# Patient Record
Sex: FEMALE | Race: WHITE | NOT HISPANIC OR LATINO | URBAN - METROPOLITAN AREA
[De-identification: names, ages, dates, MRNs, and addresses within clinical notes are randomized per-mention and may not be internally consistent; named-entity substitution may affect disease eponyms.]

---

## 2019-07-14 ENCOUNTER — EMERGENCY (EMERGENCY)
Facility: HOSPITAL | Age: 84
LOS: 1 days | Discharge: HOME | End: 2019-07-14
Attending: EMERGENCY MEDICINE | Admitting: EMERGENCY MEDICINE
Payer: MEDICARE

## 2019-07-14 VITALS
SYSTOLIC BLOOD PRESSURE: 118 MMHG | RESPIRATION RATE: 17 BRPM | DIASTOLIC BLOOD PRESSURE: 78 MMHG | HEART RATE: 88 BPM | OXYGEN SATURATION: 96 % | HEIGHT: 65 IN | WEIGHT: 149.91 LBS | TEMPERATURE: 98 F

## 2019-07-14 VITALS
RESPIRATION RATE: 16 BRPM | DIASTOLIC BLOOD PRESSURE: 51 MMHG | OXYGEN SATURATION: 100 % | HEART RATE: 86 BPM | SYSTOLIC BLOOD PRESSURE: 105 MMHG

## 2019-07-14 PROCEDURE — 72125 CT NECK SPINE W/O DYE: CPT | Mod: 26

## 2019-07-14 PROCEDURE — 99053 MED SERV 10PM-8AM 24 HR FAC: CPT

## 2019-07-14 PROCEDURE — 70450 CT HEAD/BRAIN W/O DYE: CPT

## 2019-07-14 PROCEDURE — 72125 CT NECK SPINE W/O DYE: CPT

## 2019-07-14 PROCEDURE — 99285 EMERGENCY DEPT VISIT HI MDM: CPT | Mod: 25

## 2019-07-14 PROCEDURE — 99284 EMERGENCY DEPT VISIT MOD MDM: CPT

## 2019-07-14 PROCEDURE — 70450 CT HEAD/BRAIN W/O DYE: CPT | Mod: 26

## 2019-07-14 PROCEDURE — 93010 ELECTROCARDIOGRAM REPORT: CPT

## 2019-07-14 PROCEDURE — 93005 ELECTROCARDIOGRAM TRACING: CPT

## 2019-07-14 RX ORDER — ACETAMINOPHEN 500 MG
650 TABLET ORAL ONCE
Refills: 0 | Status: COMPLETED | OUTPATIENT
Start: 2019-07-14 | End: 2019-07-14

## 2019-07-14 RX ADMIN — Medication 650 MILLIGRAM(S): at 05:20

## 2019-07-14 NOTE — ED PROVIDER NOTE - OBJECTIVE STATEMENT
83y old states she went out, was drinking, came home, was grabbing her bag, turned around, and saw herself fall, no previosu h/o same. does drink alcohol.

## 2019-07-14 NOTE — ED ADULT NURSE NOTE - NSIMPLEMENTINTERV_GEN_ALL_ED
Implemented All Universal Safety Interventions:  [place] to call system. Call bell, personal items and telephone within reach. Instruct patient to call for assistance. Room bathroom lighting operational. Non-slip footwear when patient is off stretcher. Physically safe environment: no spills, clutter or unnecessary equipment. Stretcher in lowest position, wheels locked, appropriate side rails in place.

## 2019-07-14 NOTE — ED ADULT NURSE NOTE - OBJECTIVE STATEMENT
pt came in from home for syncope/ fall. Pt does not recall what happened and woke up next to her car and has a hematoma to the right eyebrow. pt denies any pain at this time. pt states she is on ASA 325mg daily. Pt denies any n/v/d, chest pain, SOB, blurred vision, headache, dizziness, fever, chills or any other complaint at this time.

## 2019-07-14 NOTE — ED PROVIDER NOTE - PHYSICAL EXAMINATION
Constitutional : Appears comfortably, talking in full sentences  Head :NC AT , no swelling  Eyes :eomi, no swelling  Mouth :mm moist, no oral injury  Neck : supple, trachea in midline  Chest :Gene air entry, symm chest expansion, no distress  Heart :S1 S2 distant  Abdomen :abd soft, non tender  Musc/Skel :ext no swelling, no deformity, no spine tenderness, distal pulses present  Neuro  :AAO 3 no focal deficits appears comfortable, talking in full sentences  pupilsa reactive, eomi,   mm moist, no oral injury, + facial pain  facial swelling, left hematome, contused tissue  supple, no c/t/l spine tenderness, from, trachea in midline  Gene chest expansion, no chest wall tenderness, no rib tenderness, no deformity, no clavicular pain  S1 S2 distant  abd soft, non tender, no g/r,   no pelvic pain  able to walk  moves all ext, no swelling noted  Neuro aao3, cn intact grossly, no focal deficits  skin no bruises, no swelling

## 2019-07-14 NOTE — ED PROVIDER NOTE - PROGRESS NOTE DETAILS
monitored patient in ed, recalls evets better, ct done and reviewed, wound cleaned, tolerating po well patient herlinda called, to see idf patent for detox, patient lisa not think he needs, it, her two daughters called, also concerned regarding alcohol intake  patety ambulatory feela better

## 2019-07-14 NOTE — ED ADULT NURSE REASSESSMENT NOTE - NS ED NURSE REASSESS COMMENT FT1
Pt medicated as per MD order, pt given warm blankets and positioned for comfort. Call bell within reach. Pt has no questions at this time. Awaiting MD re-eval at this time.

## 2019-07-14 NOTE — ED ADULT TRIAGE NOTE - CHIEF COMPLAINT QUOTE
Pt presents to the ED s/p syncope vs fall. Pt does not recall what happened and woke up next to her car and has a hematoma to the right eyebrow, pt on ASA 325mg daily. Pt GCS 15. Pt denies any pain or complaints.

## 2021-09-27 ENCOUNTER — OUTPATIENT (OUTPATIENT)
Dept: OUTPATIENT SERVICES | Facility: HOSPITAL | Age: 86
LOS: 1 days | End: 2021-09-27
Payer: MEDICARE

## 2021-09-27 ENCOUNTER — APPOINTMENT (OUTPATIENT)
Dept: RADIOLOGY | Facility: HOSPITAL | Age: 86
End: 2021-09-27
Payer: MEDICARE

## 2021-09-27 DIAGNOSIS — Z00.8 ENCOUNTER FOR OTHER GENERAL EXAMINATION: ICD-10-CM

## 2021-09-27 PROCEDURE — 71046 X-RAY EXAM CHEST 2 VIEWS: CPT

## 2021-09-27 PROCEDURE — 71046 X-RAY EXAM CHEST 2 VIEWS: CPT | Mod: 26

## 2022-05-04 ENCOUNTER — APPOINTMENT (OUTPATIENT)
Dept: PULMONOLOGY | Facility: CLINIC | Age: 87
End: 2022-05-04
Payer: MEDICARE

## 2022-05-04 ENCOUNTER — APPOINTMENT (OUTPATIENT)
Dept: RADIOLOGY | Facility: HOSPITAL | Age: 87
End: 2022-05-04
Payer: MEDICARE

## 2022-05-04 ENCOUNTER — OUTPATIENT (OUTPATIENT)
Dept: OUTPATIENT SERVICES | Facility: HOSPITAL | Age: 87
LOS: 1 days | End: 2022-05-04
Payer: MEDICARE

## 2022-05-04 VITALS
HEIGHT: 63.25 IN | WEIGHT: 144 LBS | HEART RATE: 87 BPM | OXYGEN SATURATION: 91 % | DIASTOLIC BLOOD PRESSURE: 82 MMHG | BODY MASS INDEX: 25.2 KG/M2 | TEMPERATURE: 98.2 F | SYSTOLIC BLOOD PRESSURE: 130 MMHG

## 2022-05-04 DIAGNOSIS — Z78.9 OTHER SPECIFIED HEALTH STATUS: ICD-10-CM

## 2022-05-04 DIAGNOSIS — Z63.4 DISAPPEARANCE AND DEATH OF FAMILY MEMBER: ICD-10-CM

## 2022-05-04 DIAGNOSIS — R06.2 WHEEZING: ICD-10-CM

## 2022-05-04 DIAGNOSIS — F17.200 NICOTINE DEPENDENCE, UNSPECIFIED, UNCOMPLICATED: ICD-10-CM

## 2022-05-04 DIAGNOSIS — Z72.0 TOBACCO USE: ICD-10-CM

## 2022-05-04 DIAGNOSIS — Z82.5 FAMILY HISTORY OF ASTHMA AND OTHER CHRONIC LOWER RESPIRATORY DISEASES: ICD-10-CM

## 2022-05-04 DIAGNOSIS — Z56.0 UNEMPLOYMENT, UNSPECIFIED: ICD-10-CM

## 2022-05-04 PROCEDURE — 71046 X-RAY EXAM CHEST 2 VIEWS: CPT

## 2022-05-04 PROCEDURE — 99204 OFFICE O/P NEW MOD 45 MIN: CPT

## 2022-05-04 PROCEDURE — 71046 X-RAY EXAM CHEST 2 VIEWS: CPT | Mod: 26

## 2022-05-04 SDOH — SOCIAL STABILITY - SOCIAL INSECURITY: DISSAPEARANCE AND DEATH OF FAMILY MEMBER: Z63.4

## 2022-05-04 SDOH — ECONOMIC STABILITY - INCOME SECURITY: UNEMPLOYMENT, UNSPECIFIED: Z56.0

## 2022-05-04 NOTE — ASSESSMENT
[FreeTextEntry1] : Patient has history of long-term nicotine abuse.  Chest x-ray from 8 months ago reportedly normal.  Patient will need repeat chest x-ray along with CT of chest because of history of long-term cigarette nicotine abuse.  Dizziness discussed with patient and daughter.  Patient will be referred to neurology.

## 2022-05-04 NOTE — PHYSICAL EXAM
[No Acute Distress] : no acute distress [Normal Oropharynx] : normal oropharynx [Normal Appearance] : normal appearance [No Neck Mass] : no neck mass [Normal Rate/Rhythm] : normal rate/rhythm [Normal S1, S2] : normal s1, s2 [No Murmurs] : no murmurs [No Resp Distress] : no resp distress [Clear to Auscultation Bilaterally] : clear to auscultation bilaterally [No Abnormalities] : no abnormalities [Benign] : benign [Normal Gait] : normal gait [No Clubbing] : no clubbing [No Cyanosis] : no cyanosis [No Edema] : no edema [FROM] : FROM [Normal Color/ Pigmentation] : normal color/ pigmentation [No Focal Deficits] : no focal deficits [Oriented x3] : oriented x3 [Normal Affect] : normal affect [TextBox_132] : Patient complains of dizziness when bending over

## 2022-05-11 ENCOUNTER — APPOINTMENT (OUTPATIENT)
Dept: CT IMAGING | Facility: HOSPITAL | Age: 87
End: 2022-05-11
Payer: MEDICARE

## 2022-05-11 ENCOUNTER — OUTPATIENT (OUTPATIENT)
Dept: OUTPATIENT SERVICES | Facility: HOSPITAL | Age: 87
LOS: 1 days | End: 2022-05-11
Payer: MEDICARE

## 2022-05-11 DIAGNOSIS — R06.2 WHEEZING: ICD-10-CM

## 2022-05-11 DIAGNOSIS — Z72.0 TOBACCO USE: ICD-10-CM

## 2022-05-11 PROCEDURE — 71250 CT THORAX DX C-: CPT | Mod: 26

## 2022-05-11 PROCEDURE — 71250 CT THORAX DX C-: CPT

## 2022-05-17 ENCOUNTER — APPOINTMENT (OUTPATIENT)
Dept: PULMONOLOGY | Facility: CLINIC | Age: 87
End: 2022-05-17
Payer: MEDICARE

## 2022-05-17 VITALS
HEART RATE: 88 BPM | TEMPERATURE: 98.1 F | SYSTOLIC BLOOD PRESSURE: 110 MMHG | OXYGEN SATURATION: 90 % | HEIGHT: 63.25 IN | WEIGHT: 144 LBS | DIASTOLIC BLOOD PRESSURE: 68 MMHG | BODY MASS INDEX: 25.2 KG/M2

## 2022-05-17 DIAGNOSIS — R42 DIZZINESS AND GIDDINESS: ICD-10-CM

## 2022-05-17 PROCEDURE — 99213 OFFICE O/P EST LOW 20 MIN: CPT

## 2022-05-17 RX ORDER — IPRATROPIUM BROMIDE AND ALBUTEROL 20; 100 UG/1; UG/1
20-100 SPRAY, METERED RESPIRATORY (INHALATION) 4 TIMES DAILY
Qty: 1 | Refills: 3 | Status: ACTIVE | COMMUNITY
Start: 2022-05-17 | End: 1900-01-01

## 2022-05-17 NOTE — ASSESSMENT
[FreeTextEntry1] : CT reviewed with patient and daughter.  They are aware that there are some scattered subcentimeter nodules.  CAT scan should repeat repeated in 1 year.  Also emphysematous changes noted.  Patient is to continue with Combivent and Breo.  Of note she still complains of dizziness.  Again neurologic evaluation suggested.

## 2022-05-17 NOTE — REASON FOR VISIT
[Follow-Up] : a follow-up visit [COPD] : COPD [Shortness of Breath] : shortness of breath [Wheezing] : wheezing [Family Member] : family member

## 2022-08-17 ENCOUNTER — APPOINTMENT (OUTPATIENT)
Dept: PULMONOLOGY | Facility: CLINIC | Age: 87
End: 2022-08-17

## 2022-08-17 VITALS
WEIGHT: 150.25 LBS | HEIGHT: 63 IN | TEMPERATURE: 97.3 F | SYSTOLIC BLOOD PRESSURE: 110 MMHG | BODY MASS INDEX: 26.62 KG/M2 | DIASTOLIC BLOOD PRESSURE: 70 MMHG

## 2022-08-17 PROCEDURE — 99212 OFFICE O/P EST SF 10 MIN: CPT

## 2022-10-21 ENCOUNTER — NON-APPOINTMENT (OUTPATIENT)
Age: 87
End: 2022-10-21

## 2022-12-20 ENCOUNTER — APPOINTMENT (OUTPATIENT)
Dept: PULMONOLOGY | Facility: CLINIC | Age: 87
End: 2022-12-20

## 2022-12-20 VITALS
TEMPERATURE: 98.4 F | BODY MASS INDEX: 26.98 KG/M2 | HEART RATE: 80 BPM | SYSTOLIC BLOOD PRESSURE: 111 MMHG | DIASTOLIC BLOOD PRESSURE: 72 MMHG | HEIGHT: 63 IN | OXYGEN SATURATION: 96 % | WEIGHT: 152.25 LBS

## 2022-12-20 DIAGNOSIS — J30.89 OTHER ALLERGIC RHINITIS: ICD-10-CM

## 2022-12-20 DIAGNOSIS — J44.9 CHRONIC OBSTRUCTIVE PULMONARY DISEASE, UNSPECIFIED: ICD-10-CM

## 2022-12-20 PROCEDURE — 99213 OFFICE O/P EST LOW 20 MIN: CPT

## 2022-12-20 RX ORDER — ALBUTEROL SULFATE 90 UG/1
108 (90 BASE) INHALANT RESPIRATORY (INHALATION)
Qty: 1 | Refills: 3 | Status: ACTIVE | COMMUNITY
Start: 2022-12-20 | End: 1900-01-01

## 2022-12-20 RX ORDER — FLUTICASONE FUROATE AND VILANTEROL TRIFENATATE 100; 25 UG/1; UG/1
100-25 POWDER RESPIRATORY (INHALATION)
Qty: 1 | Refills: 11 | Status: ACTIVE | COMMUNITY
Start: 2022-10-22 | End: 1900-01-01

## 2022-12-20 RX ORDER — FLUTICASONE PROPIONATE 50 UG/1
50 SPRAY, METERED NASAL
Qty: 1 | Refills: 0 | Status: ACTIVE | COMMUNITY
Start: 2022-12-20 | End: 1900-01-01

## 2022-12-20 NOTE — PHYSICAL EXAM
[No Acute Distress] : no acute distress [Normal Oropharynx] : normal oropharynx [Normal Appearance] : normal appearance [No Neck Mass] : no neck mass [Normal Rate/Rhythm] : normal rate/rhythm [Normal S1, S2] : normal s1, s2 [No Murmurs] : no murmurs [No Resp Distress] : no resp distress [Clear to Auscultation Bilaterally] : clear to auscultation bilaterally [No Abnormalities] : no abnormalities [Normal Gait] : normal gait [No Clubbing] : no clubbing [No Cyanosis] : no cyanosis [No Edema] : no edema [FROM] : FROM [Normal Color/ Pigmentation] : normal color/ pigmentation [No Focal Deficits] : no focal deficits [Oriented x3] : oriented x3 [Normal Affect] : normal affect [TextBox_132] : Patient complains of dizziness when bending over

## 2022-12-20 NOTE — ASSESSMENT
[FreeTextEntry1] : Patient's only new complaint is frequent nasal stuffiness in the evening.  Flonase added.\par \par COPD is stable on Breo maintenance and albuterol rescue.

## 2023-06-20 ENCOUNTER — APPOINTMENT (OUTPATIENT)
Dept: PULMONOLOGY | Facility: CLINIC | Age: 88
End: 2023-06-20

## 2023-06-21 ENCOUNTER — APPOINTMENT (OUTPATIENT)
Dept: PULMONOLOGY | Facility: CLINIC | Age: 88
End: 2023-06-21

## 2023-09-26 ENCOUNTER — INPATIENT (INPATIENT)
Facility: HOSPITAL | Age: 88
LOS: 1 days | Discharge: HOME | DRG: 884 | End: 2023-09-28
Attending: STUDENT IN AN ORGANIZED HEALTH CARE EDUCATION/TRAINING PROGRAM | Admitting: INTERNAL MEDICINE
Payer: MEDICARE

## 2023-09-26 VITALS
SYSTOLIC BLOOD PRESSURE: 148 MMHG | OXYGEN SATURATION: 99 % | TEMPERATURE: 98 F | RESPIRATION RATE: 18 BRPM | HEART RATE: 110 BPM | WEIGHT: 160.06 LBS | DIASTOLIC BLOOD PRESSURE: 84 MMHG

## 2023-09-26 DIAGNOSIS — Z60.9 PROBLEM RELATED TO SOCIAL ENVIRONMENT, UNSPECIFIED: ICD-10-CM

## 2023-09-26 LAB
ALBUMIN SERPL ELPH-MCNC: 3.7 G/DL — SIGNIFICANT CHANGE UP (ref 3.3–5)
ALP SERPL-CCNC: 74 U/L — SIGNIFICANT CHANGE UP (ref 40–120)
ALT FLD-CCNC: 20 U/L — SIGNIFICANT CHANGE UP (ref 10–45)
ANION GAP SERPL CALC-SCNC: 5 MMOL/L — SIGNIFICANT CHANGE UP (ref 5–17)
APPEARANCE UR: CLEAR — SIGNIFICANT CHANGE UP
AST SERPL-CCNC: 23 U/L — SIGNIFICANT CHANGE UP (ref 10–40)
BACTERIA # UR AUTO: ABNORMAL /HPF
BASOPHILS # BLD AUTO: 0.14 K/UL — SIGNIFICANT CHANGE UP (ref 0–0.2)
BASOPHILS NFR BLD AUTO: 1.4 % — SIGNIFICANT CHANGE UP (ref 0–2)
BILIRUB SERPL-MCNC: 0.5 MG/DL — SIGNIFICANT CHANGE UP (ref 0.2–1.2)
BILIRUB UR-MCNC: NEGATIVE — SIGNIFICANT CHANGE UP
BUN SERPL-MCNC: 25 MG/DL — HIGH (ref 7–23)
CALCIUM SERPL-MCNC: 9.5 MG/DL — SIGNIFICANT CHANGE UP (ref 8.4–10.5)
CHLORIDE SERPL-SCNC: 103 MMOL/L — SIGNIFICANT CHANGE UP (ref 96–108)
CO2 SERPL-SCNC: 32 MMOL/L — HIGH (ref 22–31)
COLOR SPEC: YELLOW — SIGNIFICANT CHANGE UP
CREAT SERPL-MCNC: 0.9 MG/DL — SIGNIFICANT CHANGE UP (ref 0.5–1.3)
DIFF PNL FLD: ABNORMAL
EGFR: 62 ML/MIN/1.73M2 — SIGNIFICANT CHANGE UP
EOSINOPHIL # BLD AUTO: 0.3 K/UL — SIGNIFICANT CHANGE UP (ref 0–0.5)
EOSINOPHIL NFR BLD AUTO: 3 % — SIGNIFICANT CHANGE UP (ref 0–6)
EPI CELLS # UR: 1 — SIGNIFICANT CHANGE UP
GLUCOSE SERPL-MCNC: 98 MG/DL — SIGNIFICANT CHANGE UP (ref 70–99)
GLUCOSE UR QL: NEGATIVE MG/DL — SIGNIFICANT CHANGE UP
HCT VFR BLD CALC: 40.3 % — SIGNIFICANT CHANGE UP (ref 34.5–45)
HGB BLD-MCNC: 13.3 G/DL — SIGNIFICANT CHANGE UP (ref 11.5–15.5)
IMM GRANULOCYTES NFR BLD AUTO: 0.6 % — SIGNIFICANT CHANGE UP (ref 0–0.9)
KETONES UR-MCNC: 15 MG/DL
LEUKOCYTE ESTERASE UR-ACNC: ABNORMAL
LYMPHOCYTES # BLD AUTO: 1.38 K/UL — SIGNIFICANT CHANGE UP (ref 1–3.3)
LYMPHOCYTES # BLD AUTO: 13.9 % — SIGNIFICANT CHANGE UP (ref 13–44)
MCHC RBC-ENTMCNC: 32.6 PG — SIGNIFICANT CHANGE UP (ref 27–34)
MCHC RBC-ENTMCNC: 33 GM/DL — SIGNIFICANT CHANGE UP (ref 32–36)
MCV RBC AUTO: 98.8 FL — SIGNIFICANT CHANGE UP (ref 80–100)
MONOCYTES # BLD AUTO: 0.82 K/UL — SIGNIFICANT CHANGE UP (ref 0–0.9)
MONOCYTES NFR BLD AUTO: 8.2 % — SIGNIFICANT CHANGE UP (ref 2–14)
NEUTROPHILS # BLD AUTO: 7.24 K/UL — SIGNIFICANT CHANGE UP (ref 1.8–7.4)
NEUTROPHILS NFR BLD AUTO: 72.9 % — SIGNIFICANT CHANGE UP (ref 43–77)
NITRITE UR-MCNC: NEGATIVE — SIGNIFICANT CHANGE UP
NRBC # BLD: 0 /100 WBCS — SIGNIFICANT CHANGE UP (ref 0–0)
PH UR: 6 — SIGNIFICANT CHANGE UP (ref 5–8)
PLATELET # BLD AUTO: 316 K/UL — SIGNIFICANT CHANGE UP (ref 150–400)
POTASSIUM SERPL-MCNC: 4.1 MMOL/L — SIGNIFICANT CHANGE UP (ref 3.5–5.3)
POTASSIUM SERPL-SCNC: 4.1 MMOL/L — SIGNIFICANT CHANGE UP (ref 3.5–5.3)
PROT SERPL-MCNC: 7.1 G/DL — SIGNIFICANT CHANGE UP (ref 6–8.3)
PROT UR-MCNC: SIGNIFICANT CHANGE UP MG/DL
RBC # BLD: 4.08 M/UL — SIGNIFICANT CHANGE UP (ref 3.8–5.2)
RBC # FLD: 13.3 % — SIGNIFICANT CHANGE UP (ref 10.3–14.5)
RBC CASTS # UR COMP ASSIST: >50 /HPF — HIGH (ref 0–4)
SODIUM SERPL-SCNC: 140 MMOL/L — SIGNIFICANT CHANGE UP (ref 135–145)
SP GR SPEC: 1.01 — SIGNIFICANT CHANGE UP (ref 1–1.03)
UROBILINOGEN FLD QL: 0.2 MG/DL — SIGNIFICANT CHANGE UP (ref 0.2–1)
WBC # BLD: 9.94 K/UL — SIGNIFICANT CHANGE UP (ref 3.8–10.5)
WBC # FLD AUTO: 9.94 K/UL — SIGNIFICANT CHANGE UP (ref 3.8–10.5)
WBC UR QL: 1 /HPF — SIGNIFICANT CHANGE UP (ref 0–5)

## 2023-09-26 PROCEDURE — 93010 ELECTROCARDIOGRAM REPORT: CPT

## 2023-09-26 PROCEDURE — 99285 EMERGENCY DEPT VISIT HI MDM: CPT

## 2023-09-26 RX ORDER — BUDESONIDE AND FORMOTEROL FUMARATE DIHYDRATE 160; 4.5 UG/1; UG/1
2 AEROSOL RESPIRATORY (INHALATION)
Refills: 0 | Status: DISCONTINUED | OUTPATIENT
Start: 2023-09-26 | End: 2023-09-28

## 2023-09-26 RX ORDER — SODIUM CHLORIDE 9 MG/ML
500 INJECTION INTRAMUSCULAR; INTRAVENOUS; SUBCUTANEOUS ONCE
Refills: 0 | Status: COMPLETED | OUTPATIENT
Start: 2023-09-26 | End: 2023-09-26

## 2023-09-26 RX ORDER — INFLUENZA VIRUS VACCINE 15; 15; 15; 15 UG/.5ML; UG/.5ML; UG/.5ML; UG/.5ML
0.7 SUSPENSION INTRAMUSCULAR ONCE
Refills: 0 | Status: DISCONTINUED | OUTPATIENT
Start: 2023-09-26 | End: 2023-09-28

## 2023-09-26 RX ORDER — ENOXAPARIN SODIUM 100 MG/ML
40 INJECTION SUBCUTANEOUS EVERY 24 HOURS
Refills: 0 | Status: DISCONTINUED | OUTPATIENT
Start: 2023-09-26 | End: 2023-09-28

## 2023-09-26 RX ORDER — ALBUTEROL 90 UG/1
2 AEROSOL, METERED ORAL EVERY 6 HOURS
Refills: 0 | Status: DISCONTINUED | OUTPATIENT
Start: 2023-09-26 | End: 2023-09-28

## 2023-09-26 RX ADMIN — SODIUM CHLORIDE 1000 MILLILITER(S): 9 INJECTION INTRAMUSCULAR; INTRAVENOUS; SUBCUTANEOUS at 15:32

## 2023-09-26 RX ADMIN — SODIUM CHLORIDE 500 MILLILITER(S): 9 INJECTION INTRAMUSCULAR; INTRAVENOUS; SUBCUTANEOUS at 17:18

## 2023-09-26 RX ADMIN — ENOXAPARIN SODIUM 40 MILLIGRAM(S): 100 INJECTION SUBCUTANEOUS at 23:27

## 2023-09-26 SDOH — SOCIAL STABILITY - SOCIAL INSECURITY: PROBLEM RELATED TO SOCIAL ENVIRONMENT, UNSPECIFIED: Z60.9

## 2023-09-26 NOTE — H&P ADULT - NSHPLABSRESULTS_GEN_ALL_CORE
13.3   9.94  )-----------( 316      ( 26 Sep 2023 14:40 )             40.3       09-26    140  |  103  |  25<H>  ----------------------------<  98  4.1   |  32<H>  |  0.90    Ca    9.5      26 Sep 2023 14:40    TPro  7.1  /  Alb  3.7  /  TBili  0.5  /  DBili  x   /  AST  23  /  ALT  20  /  AlkPhos  74  09-26              Urinalysis Basic - ( 26 Sep 2023 14:40 )    Color: x / Appearance: x / SG: x / pH: x  Gluc: 98 mg/dL / Ketone: x  / Bili: x / Urobili: x   Blood: x / Protein: x / Nitrite: x   Leuk Esterase: x / RBC: x / WBC x   Sq Epi: x / Non Sq Epi: x / Bacteria: x            Lactate Trend            CAPILLARY BLOOD GLUCOSE

## 2023-09-26 NOTE — ED ADULT NURSE NOTE - OBJECTIVE STATEMENT
88 yr old female to ED for medical evaluation. Patient BIB EMS for wondering outside the house. Per Paramedic patient was found locked out of her house. Patient confused, denies pain, complaints. Patient calm, cooperative. Patient states she lives with her daughter and left her cell phone at home. Patient has history of Dementia.

## 2023-09-26 NOTE — ED PROVIDER NOTE - CLINICAL SUMMARY MEDICAL DECISION MAKING FREE TEXT BOX
87 y/o F with asthma and dementia  Was brought in by EMS from home, patient was found locked out of her house.  Patient has no complaints.  Patient reports she was at work.  She is unaware of what happened earlier this morning. She thinks she was in the ER all morning. Pt is pleasantly confused. PE as noted above. No contact information on patient's chart.  I contacted patient's pulmonologist Dr. Shah's office for possible emergency contacts they provided me with her daughter Marimar phone # [phone], however the number has been changed and is a nonworking number.  Basic labs ordered and admission labs ordered for social admission.  Will speak with  in the ED Silvia 89 y/o F with asthma and dementia  Was brought in by EMS from home, patient was found locked out of her house.  Patient has no complaints.  Patient reports she was at work.  She is unaware of what happened earlier this morning. She thinks she was in the ER all morning. Pt is pleasantly confused. PE as noted above. No contact information on patient's chart.  I contacted patient's pulmonologist Dr. Shah's office for possible emergency contacts they provided me with her daughter Marimar phone # [phone], however the number has been changed and is a nonworking number.  Basic labs ordered and admission labs ordered for social admission.  Will speak with  in the ED Silvia    553pm: labs reviewed, See  Silvia's note.  Will admit for social reasons.  Patient's daughter Marimar feels unsafe taking her back home.

## 2023-09-26 NOTE — H&P ADULT - ASSESSMENT
87 y/o F with PMH of asthma and dementia  was brought in by EMS from home, patient was found locked out of her house. Patient being admitted for social issues and placement.     #Social Issue, needs living arrangement or placement  -Patient's daughter Marimar does not want to take her back home  -SW consult, appreciate SW involvement in ED  -Patient refused CXR and EKG  -F/U urine culture  -Labs reviewed     #Asthma/hx COPD  -Per [facility] patient followed with Dr. Garcia  -Was on breo elipta at home  -Continue with Symbicort daily and albuterol prn    #DVT prophylaxis  -Lovenox    89 y/o F with PMH of asthma and dementia  was brought in by EMS from home, patient was found locked out of her house. Patient being admitted for social issues and placement.       #Social Issue, needs living arrangement or placement  #Dementia   -Patient's daughter Marimar does not want to take her back home  -Reorient as needed, patient unable to make decisions on her own   -SW consult, appreciate SW involvement in ED  -Patient refusing CXR and EKG  -F/U urine culture  -Labs reviewed     #Asthma/hx COPD  -Per [facility] patient followed with Dr. Garcia  -Was on breo elipta at home  -Continue with Symbicort daily and albuterol prn    #DVT prophylaxis  -Lovenox

## 2023-09-26 NOTE — ED ADULT NURSE NOTE - NS ED NURSE LEVEL OF CONSCIOUSNESS SPEECH
Other:      LTR along L UT. More sensitivity to periosteal tapping at levator insertion. Follow DN with MT and loaded musculature. Ended with stretches. Advised pt of DN effects and what to expect the next couple days. Patient will continue to benefit from skilled PT / OT services to modify and progress therapeutic interventions, analyze and address functional mobility deficits, analyze and address ROM deficits, analyze and address strength deficits, analyze and address soft tissue restrictions, analyze and cue for proper movement patterns, analyze and modify for postural abnormalities, and instruct in home and community integration to address functional deficits and attain remaining goals. Progress toward goals / Updated goals:  []  See PN    First session DN.     PLAN  yes Continue plan of care  [x]  Upgrade activities as tolerated  []  Discharge due to :  []  Other:    [facility], PT    8/7/2023    10:04 AM    Future Appointments   Date Time Provider [address]   8/7/2023  3:00 PM [facility], PT [facility]   8/9/2023  2:20 PM [facility], PT [facility]   8/14/2023  1:40 PM [facility], PT [facility]   8/16/2023  2:20 PM [facility], PT [facility]   8/21/2023  3:00 PM [facility], PT [facility]   8/23/2023  2:20 PM [facility], PT [facility]   8/28/2023  3:00 PM [facility], PT [facility]   8/30/2023  2:20 PM [facility], PT [facility]
Age appropriate

## 2023-09-26 NOTE — ED ADULT NURSE NOTE - NSFALLHARMRISKINTERV_ED_ALL_ED

## 2023-09-26 NOTE — ED PROVIDER NOTE - PHYSICAL EXAMINATION
Gen: Well appearing in NAD.   Head: atraumatic  Heart: s1/s2, RRR  Lung: CTA b/l,   Abd: soft, NT/ND.  Msk: no pedal edema. Pelvis stable.  Full range of motion of hips bilateral and shoulders bilaterally  Neuro: Awake and alert, patient moving all extremity equally, no focal neuro deficits noted  Skin: Normal for race. No bruising noted.  Psych: Alert and oriented

## 2023-09-26 NOTE — H&P ADULT - NSHPADDITIONALINFOADULT_GEN_ALL_CORE
D/w PA and agree with RAJINDER Rosas's assessment and plan. Social work referral, will need to contact pt's HCP/son (who is out of state).

## 2023-09-26 NOTE — ED ADULT NURSE REASSESSMENT NOTE - CONDITION
Patient calm, awaiting admission./improved
Quality 431: Preventive Care And Screening: Unhealthy Alcohol Use - Screening: Patient screened for unhealthy alcohol use using a single question and scores less than 2 times per year
Detail Level: Detailed
Quality 130: Documentation Of Current Medications In The Medical Record: Current Medications Documented
Quality 402: Tobacco Use And Help With Quitting Among Adolescents: Patient screened for tobacco and never smoked

## 2023-09-26 NOTE — CHART NOTE - NSCHARTNOTEFT_GEN_A_CORE
MAICOL consult placed.  Chart reviewed.  MAICOL met with patient at bedside, introduced self.  Patient with dementia, unaware of events that led to ED visit.  As per EMS, patient was found outside of home.  MAICOL attempted to reach daughter listed on file, number not in service.  MAICOL received call from daughter Marimar stating her phone is not working but is using neighbors phone at [phone] where she can be reached. Marimar reports she went out shopping for errands and when she arrived back home her mother was not there.  Marimar reports patient has been getting progressively more aggressive and reports she does not feel safe to be with patient. MAICOL spoke with Marimar regarding patients dementia diagnosis and offered to assist with resources for private hire home health aide and offered to provide resources for nursing home placement.  Marimar requested MAICOL to call patients son Estevan (cell, [phone]; house, [phone]) or patients daughter Micaela (cell, [phone]) as they are patients health care proxy.  Calls placed to both Estevan (resides in [place]) and Micaela (resides in [place]) who report they were not aware patient was in the ED and report they have not been in contact with both patient and sister Denise for several years.  Estevan reports Marimar has lived with patient for 20+ years in patients home.  Estevan reports he will look for health care proxy paperwork and will be in contact.  MAICOL contacted Marimar again at [phone] to discuss safe DC plan as patient does not have medical reason for admission, SW unable to reach Marimar at this time.

## 2023-09-26 NOTE — PATIENT PROFILE ADULT - FUNCTIONAL ASSESSMENT - BASIC MOBILITY 6.
2-calculated by average/Not able to assess (calculate score using [facility] averaging method)  2-calculated by average/Not able to assess (calculate score using [facility] averaging method)

## 2023-09-26 NOTE — H&P ADULT - TIME BILLING
Management of acute medical problem, mainly social issues, at this time. Management of acute medical problem, mainly social issues, at this time. Will likely need placement vs home care, needs social work,

## 2023-09-26 NOTE — PATIENT PROFILE ADULT - FALL HARM RISK - HARM RISK INTERVENTIONS

## 2023-09-26 NOTE — H&P ADULT - NSHPPHYSICALEXAM_GEN_ALL_CORE
Vital Signs Last 24 Hrs  T(F): 97.9 (26 Sep 2023 12:55), Max: 97.9 (26 Sep 2023 12:55)  HR: 110 (26 Sep 2023 12:55) (110 - 110)  BP: 148/84 (26 Sep 2023 12:55) (148/84 - 148/84)  RR: 18 (26 Sep 2023 12:55) (18 - 18)  SpO2: 99% (26 Sep 2023 12:55) (99% - 99%)    PHYSICAL EXAM:  GENERAL: NAD, well-groomed, well-developed  HEAD:  Atraumatic, Normocephalic  EYES: EOMI, conjunctiva and sclera clear  ENMT: Moist mucous membranes, Good dentition, no thrush  NECK: Supple, No JVD  CHEST/LUNG: Clear to auscultation bilaterally, good air entry, non-labored breathing  HEART: RRR; S1/S2, No murmur  ABDOMEN: Soft, Nontender, Nondistended; Bowel sounds present  EXTREMITIES: No calf tenderness, No cyanosis, No edema  SKIN: Warm, Intact  NERVOUS SYSTEM:  A/O x1/2, confused, Good concentration; CN 2-12 intact, No focal deficits Vital Signs Last 24 Hrs  T(F): 97.9 (26 Sep 2023 12:55), Max: 97.9 (26 Sep 2023 12:55)  HR: 110 (26 Sep 2023 12:55) (110 - 110)  BP: 148/84 (26 Sep 2023 12:55) (148/84 - 148/84)  RR: 18 (26 Sep 2023 12:55) (18 - 18)  SpO2: 99% (26 Sep 2023 12:55) (99% - 99%)    PHYSICAL EXAM:  GENERAL: NAD, well-groomed, well-developed  HEAD:  Atraumatic, Normocephalic  EYES: EOMI, conjunctiva and sclera clear  ENMT: Moist mucous membranes, Good dentition, no thrush  NECK: Supple, No JVD  CHEST/LUNG: Clear to auscultation bilaterally, good air entry, non-labored breathing  HEART: RRR; S1/S2, No murmur  ABDOMEN: Soft, Nontender, Nondistended; Bowel sounds present  EXTREMITIES: No calf tenderness, No cyanosis, No edema  SKIN: Warm, Intact  NERVOUS SYSTEM:  A/O x1/2, confused, CN 2-12 intact, No focal deficits

## 2023-09-26 NOTE — ED PROVIDER NOTE - OBJECTIVE STATEMENT
87 y/o F with asthma and dementia  Was brought in by EMS from home, patient was found locked out of her house.  Patient has no complaints.  Patient reports she was at work.  She is unaware of what happened earlier this morning. She thinks she was in the ER all morning.

## 2023-09-26 NOTE — H&P ADULT - HISTORY OF PRESENT ILLNESS
87 y/o F with PMH of asthma and dementia  was brought in by EMS from home, patient was found locked out of her house.  Patient has no complaints.  Patient reports she was at work.  She is unaware of what happened earlier this morning. She thinks she was in the hospital all day and had surgery. Patient is being admitted due to social issues. Daughter does not want to take patient back home. Case to be discussed with other siblings, however they have not spoken with patient or Marimar in several years. Unable to obtain further history due to patient's mental condition, and children's lack of knowledge in patient's updated history.  89 y/o F with PMH of asthma and dementia  was brought in by EMS from home, patient was found locked out of her house.  Patient has no complaints.  Patient reports she was at work.  She is unaware of what happened earlier this morning. She thinks she was in the hospital all day and had surgery. Patient is being admitted due to social issues. Daughter does not want to take patient back home. Case to be discussed with other siblings, however they have not spoken with patient or Marimar in several years. Unable to obtain further history due to patient's mental condition, and children's lack of knowledge in patient's updated history.   Pt refused cxray and EKG.

## 2023-09-27 RX ADMIN — BUDESONIDE AND FORMOTEROL FUMARATE DIHYDRATE 2 PUFF(S): 160; 4.5 AEROSOL RESPIRATORY (INHALATION) at 21:48

## 2023-09-27 RX ADMIN — ENOXAPARIN SODIUM 40 MILLIGRAM(S): 100 INJECTION SUBCUTANEOUS at 22:00

## 2023-09-27 RX ADMIN — BUDESONIDE AND FORMOTEROL FUMARATE DIHYDRATE 2 PUFF(S): 160; 4.5 AEROSOL RESPIRATORY (INHALATION) at 09:19

## 2023-09-27 NOTE — DIETITIAN NUTRITION RISK NOTIFICATION - TREATMENT: THE FOLLOWING DIET HAS BEEN RECOMMENDED
Diet, Minced and Moist:   Supplement Feeding Modality:  Oral  Ensure Plus High Protein Cans or Servings Per Day:  1       Frequency:  Two Times a day (09-27-23 @ 13:18) [Active]

## 2023-09-27 NOTE — DIETITIAN INITIAL EVALUATION ADULT - PERTINENT LABORATORY DATA
09-26    140  |  103  |  25<H>  ----------------------------<  98  4.1   |  32<H>  |  0.90    Ca    9.5      26 Sep 2023 14:40    TPro  7.1  /  Alb  3.7  /  TBili  0.5  /  DBili  x   /  AST  23  /  ALT  20  /  AlkPhos  74  09-26

## 2023-09-27 NOTE — DIETITIAN INITIAL EVALUATION ADULT - OTHER INFO
89 y/o F with PMH of asthma and dementia  was brought in by EMS from home, patient was found locked out of her house. Patient admitted for social issue ,daughter does not want to take patient back home, Patient noted pleasantly confused , unable to obtain diet /weight hx due to dementia, No edema. Skin intact, unknown last BM , po intake appears poor patient" dozing off" during lunch , NFPE conducted patient with evidence of mild - moderate muscle wasting / fat loss , due to patient's advanced age &  hx of dementia patient would benefit from altered consistency diet , suggest minced moist with Ensure High protein BID (700kcals, 40gms protein )

## 2023-09-27 NOTE — PHYSICAL THERAPY INITIAL EVALUATION ADULT - ADDITIONAL COMMENTS
pt questionable informant. hx per pt and chart. pt lives w/dtr in private house, 2 [address] w/rail, 1 flt to br. pt states she is independent ambulation and most ADL's

## 2023-09-27 NOTE — PROGRESS NOTE ADULT - ASSESSMENT
89 y/o F with PMH of asthma and dementia  was brought in by EMS from home, patient was found locked out of her house.  Patient being admitted for social issues and placement.     #Social Issue, probably needs SNF placement  #Dementia   -per admission notes; patient's daughter Marimar does not want to take her back home  -Reorient as needed, patient unable to make decisions on her own   -SW consult pending; pt may need to transition from [facility] to long term SNF placement  -pt does not appear to have an acute infection, she is afebrile, no wbc  -F/U urine culture  -PT eval pending to assess for gait stability    #Asthma/hx COPD  -Continue with Symbicort daily and albuterol prn  -pt refusing CXR upon admission    #DVT prophylaxis  -Lovenox       GOC;  FULL CODE    Dispo:  pending discussion with family/HCP on plans for possible [facility]; PT eval pending

## 2023-09-27 NOTE — PROGRESS NOTE ADULT - SUBJECTIVE AND OBJECTIVE BOX
Patient is a 88y old  Female who presents with a chief complaint of social issue, daughter does not want to take patient back home (26 Sep 2023 18:10)      Patient seen and examined at bedside. No overnight events reported.   She reports no complaints.    ALLERGIES:  No Known Allergies    MEDICATIONS  (STANDING):  budesonide  80 MICROgram(s)/formoterol 4.5 MICROgram(s) Inhaler 2 Puff(s) Inhalation two times a day  enoxaparin Injectable 40 milliGRAM(s) SubCutaneous every 24 hours  influenza  Vaccine (HIGH DOSE) 0.7 milliLiter(s) IntraMuscular once    MEDICATIONS  (PRN):  albuterol    90 MICROgram(s) HFA Inhaler 2 Puff(s) Inhalation every 6 hours PRN Shortness of Breath and/or Wheezing    Vital Signs Last 24 Hrs  T(F): 98.1 (27 Sep 2023 05:13), Max: 98.1 (26 Sep 2023 22:10)  HR: 84 (27 Sep 2023 05:13) (84 - 110)  BP: 127/75 (27 Sep 2023 05:13) (125/52 - 148/84)  RR: 18 (27 Sep 2023 05:13) (17 - 18)  SpO2: 94% (27 Sep 2023 05:13) (94% - 99%)  I&O's Summary    26 Sep 2023 07:01  -  27 Sep 2023 07:00  --------------------------------------------------------  IN: 0 mL / OUT: 200 mL / NET: -200 mL      PHYSICAL EXAM:  General: NAD, pleasantly confused.  ENT: No gross hearing impairment, Moist mucous membranes, no thrush  Neck: Supple, No JVD  Lungs: Clear to auscultation bilaterally, good air entry, non-labored breathing  Cardio: RRR, S1/S2, No murmur  Abdomen: Soft, Nontender, Nondistended; Bowel sounds present  Extremities: No calf tenderness, No cyanosis, No pitting edema  Neuro: alert, follows commands    LABS:                        13.3   9.94  )-----------( 316      ( 26 Sep 2023 14:40 )             40.3     09-    140  |  103  |  25  ----------------------------<  98  4.1   |  32  |  0.90    Ca    9.5      26 Sep 2023 14:40    TPro  7.1  /  Alb  3.7  /  TBili  0.5  /  DBili  x   /  AST  23  /  ALT  20  /  AlkPhos  74                                        Urinalysis Basic - ( 26 Sep 2023 17:51 )    Color: Yellow / Appearance: Clear / S.013 / pH: x  Gluc: x / Ketone: 15 mg/dL  / Bili: Negative / Urobili: 0.2 mg/dL   Blood: x / Protein: Trace mg/dL / Nitrite: Negative   Leuk Esterase: Small / RBC: >50 /HPF / WBC 1 /HPF   Sq Epi: x / Non Sq Epi: x / Bacteria: Occasional /HPF          RADIOLOGY & ADDITIONAL TESTS:    Care Discussed with Consultants/Other Providers:

## 2023-09-27 NOTE — DIETITIAN INITIAL EVALUATION ADULT - PERTINENT MEDS FT
MEDICATIONS  (STANDING):  budesonide  80 MICROgram(s)/formoterol 4.5 MICROgram(s) Inhaler 2 Puff(s) Inhalation two times a day  enoxaparin Injectable 40 milliGRAM(s) SubCutaneous every 24 hours  influenza  Vaccine (HIGH DOSE) 0.7 milliLiter(s) IntraMuscular once    MEDICATIONS  (PRN):  albuterol    90 MICROgram(s) HFA Inhaler 2 Puff(s) Inhalation every 6 hours PRN Shortness of Breath and/or Wheezing

## 2023-09-27 NOTE — PROGRESS NOTE ADULT - NS ATTEND AMEND GEN_ALL_CORE FT
87 y/o F with PMH of asthma and dementia was brought in by EMS from home, patient was found locked out of her house. Unsafe to return home.  PT/OT consult. SW/CM consult for placement.

## 2023-09-27 NOTE — PHYSICAL THERAPY INITIAL EVALUATION ADULT - PERTINENT HX OF CURRENT PROBLEM, REHAB EVAL
89 y/o F with PMH of asthma and dementia  was brought in by EMS from home, patient was found locked out of her house.  Patient has no complaints.  Patient reports she was at work.  She is unaware of what happened earlier this morning. She thinks she was in the hospital all day and had surgery. Patient is being admitted due to social issues. Daughter does not want to take patient back home. Case to be discussed with other siblings, however they have not spoken with patient or Marimar in several years. Unable to obtain further history due to patient's mental condition, and children's lack of knowledge in patient's updated history.   Pt refused cxray and EKG.

## 2023-09-27 NOTE — PHYSICAL THERAPY INITIAL EVALUATION ADULT - PATIENT PROFILE REVIEW, REHAB EVAL
[address] [facility] LETHA Baig MD  [phone]      December 5, 2018    Annmarie Cassidy  YOB: 1955    COLONOSCOPY DISCHARGE INSTRUCTIONS    If there is redness at IV site you should apply warm compress to area. If redness or soreness persist contact Dr. Elder Baig' or your primary care doctor. There may be a slight amount of blood passed from the rectum. Gaseous discomfort may develop, but walking, belching will help relieve this. You may not operate a vehicle for 12 hours  You may not operate machinery or dangerous appliances for rest of today  You may not drink alcoholic beverages for 12 hours  Avoid making any critical decisions for 24 hours    DIET:  You may resume your normal diet, but some patients find that heavy or large meals may lead to indigestion or vomiting. I suggest a light meal as first food intake. MEDICATIONS:  The use of some over-the-counter pain medication may lead to bleeding after colon biopsies or polyp removal.  Tylenol (also called acetaminophen) is safe to take even if you have had colonoscopy with polyp removal.  Based on the procedure you had today you may not safely take aspirin or aspirin-like products for the next ten (10) days. Remember that Tylenol (also called acetaminophen) is safe to take after colonoscopy even if you have had biopsies or polyps removed. ACTIVITY:  You may resume your normal household activities, but it is recommended that you spend the remainder of the day resting -  avoid any strenuous activity.     CALL DR. Everrett Dakin' OFFICE IF:  Increasing pain, nausea, vomiting  Abdominal distension (swelling)  Significant new or increased bleeding (oral or rectal)  Fever/Chills  Chest pain/shortness of breath                       Additional instructions:   No aspirin 10 days  We found one polyp and removed that today  I will contact you with the polyp results when available and advise when to have next colonoscopy based on those results. It was an honor to be your doctor today. Please let me or my office staff know if you have any feedback about today's procedure. Lisa Jerez MD    Colonoscopy saves lives, and can prevent colon cancer. Everyone aged 48 or older needs colonoscopy.   Tell your family and friends: get the test! yes

## 2023-09-28 ENCOUNTER — TRANSCRIPTION ENCOUNTER (OUTPATIENT)
Age: 88
End: 2023-09-28

## 2023-09-28 VITALS — OXYGEN SATURATION: 97 %

## 2023-09-28 PROCEDURE — 99239 HOSP IP/OBS DSCHRG MGMT >30: CPT

## 2023-09-28 PROCEDURE — 80053 COMPREHEN METABOLIC PANEL: CPT

## 2023-09-28 PROCEDURE — 94640 AIRWAY INHALATION TREATMENT: CPT

## 2023-09-28 PROCEDURE — 81001 URINALYSIS AUTO W/SCOPE: CPT

## 2023-09-28 PROCEDURE — 36415 COLL VENOUS BLD VENIPUNCTURE: CPT

## 2023-09-28 PROCEDURE — G0378: CPT

## 2023-09-28 PROCEDURE — 99285 EMERGENCY DEPT VISIT HI MDM: CPT

## 2023-09-28 PROCEDURE — 85025 COMPLETE CBC W/AUTO DIFF WBC: CPT

## 2023-09-28 PROCEDURE — 93005 ELECTROCARDIOGRAM TRACING: CPT

## 2023-09-28 PROCEDURE — 97162 PT EVAL MOD COMPLEX 30 MIN: CPT

## 2023-09-28 RX ORDER — BACITRACIN 500 [USP'U]/G
1 OINTMENT OPHTHALMIC THREE TIMES A DAY
Refills: 0 | Status: DISCONTINUED | OUTPATIENT
Start: 2023-09-28 | End: 2023-09-28

## 2023-09-28 RX ORDER — ERYTHROMYCIN BASE 5 MG/GRAM
1 OINTMENT (GRAM) OPHTHALMIC (EYE) THREE TIMES A DAY
Refills: 0 | Status: DISCONTINUED | OUTPATIENT
Start: 2023-09-28 | End: 2023-09-28

## 2023-09-28 RX ORDER — ERYTHROMYCIN BASE 5 MG/GRAM
1 OINTMENT (GRAM) OPHTHALMIC (EYE)
Qty: 0 | Refills: 0 | DISCHARGE
Start: 2023-09-28

## 2023-09-28 RX ADMIN — BUDESONIDE AND FORMOTEROL FUMARATE DIHYDRATE 2 PUFF(S): 160; 4.5 AEROSOL RESPIRATORY (INHALATION) at 09:34

## 2023-09-28 RX ADMIN — Medication 1 APPLICATION(S): at 10:52

## 2023-09-28 NOTE — DISCHARGE NOTE NURSING/CASE MANAGEMENT/SOCIAL WORK - NSDCPEFALRISK_GEN_ALL_CORE
For information on Fall & Injury Prevention, visit: [email or web address] OR  [email or web address] OR  https://www.cdc.gov/steadi/patient.html

## 2023-09-28 NOTE — PROGRESS NOTE ADULT - NS ATTEND AMEND GEN_ALL_CORE FT
#Blepharitis left eye  -cont with bacitracin ointment    #asthma/copd - not in exacerbation  - cont with symbicort   - albuterol prn     #dementia without behavior disturbances  - reorientation as needed  - DC home today. pt's daughter will pick pt up today #Blepharitis left eye  -cont with erythromycin ointment    #asthma/copd - not in exacerbation  - cont with symbicort   - albuterol prn     #dementia without behavior disturbances  - reorientation as needed  - DC home today. pt's daughter will pick pt up today #Blepharitis left eye  -cont with erythromycin ointment    #asthma/copd - not in exacerbation  - cont with symbicort   - albuterol prn     #dementia   - reorientation as needed  - DC home today. pt's daughter will pick pt up to go home today

## 2023-09-28 NOTE — DISCHARGE NOTE PROVIDER - HOSPITAL COURSE
Hospital Course  89 y/o F with PMH of asthma and dementia  was brought in by EMS from home, patient was found locked out of her house.  Patient being admitted for social issues and possible placement.   Per conversation with daughter Marimar she wants to take her Mother home, she cares for her and lives with her.  She believes it was a misunderstanding when pt arrived to the ED.  The patient was very confused and was having behavior issues, it is possible neighbor called 911 and pt was brought to hospital.  During this hospital stay pt has been calm with NO BEHAVIOR ISSUES.  She does not appear to have an acute infection, she is afebrile, no elevated wbc.  Pt refused CXR in the ED.  PT eval noted; per daughter she can go to outpatient PT.  Pt also with Blepharitis of the left eye-- antibx ointment ordered.  Pt and family refusing JASON.    Discharging Provider:  RAJINDER Geronimo  Contact Info: Cell [phone] - Please call with any questions or concerns.

## 2023-09-28 NOTE — PROGRESS NOTE ADULT - REASON FOR ADMISSION
social issue, daughter does not want to take patient back home
social issue, daughter does not want to take patient back home

## 2023-09-28 NOTE — DISCHARGE NOTE PROVIDER - NSDCMRMEDTOKEN_GEN_ALL_CORE_FT
Breo Ellipta 100 mcg-25 mcg/inh inhalation powder: 1 puff(s) inhaled once a day  erythromycin 0.5% ophthalmic ointment: 1 application to each affected eye 3 times a day

## 2023-09-28 NOTE — DISCHARGE NOTE NURSING/CASE MANAGEMENT/SOCIAL WORK - PATIENT PORTAL LINK FT
You can access the FollowMyHealth Patient Portal offered by [facility] by registering at the following website: [email or web address]. By joining [facility]’s FollowMyHealth portal, you will also be able to view your health information using other applications (apps) compatible with our system.

## 2023-09-28 NOTE — PROGRESS NOTE ADULT - NUTRITIONAL ASSESSMENT
This patient has been assessed with a concern for Malnutrition and has been determined to have a diagnosis/diagnoses of Moderate protein-calorie malnutrition.    This patient is being managed with:   Diet Minced and Moist-  Supplement Feeding Modality:  Oral  Ensure Plus High Protein Cans or Servings Per Day:  1       Frequency:  Two Times a day  Entered: Sep 27 2023  1:18PM

## 2023-09-28 NOTE — PROGRESS NOTE ADULT - ASSESSMENT
87 y/o F with PMH of asthma and dementia  was brought in by EMS from home, patient was found locked out of her house.  Patient being admitted for social issues and placement.     #Social Issue, probably needs SNF placement  #Dementia   -per admission notes; patient's daughter Marimar does not want to take her back home  -Reorient as needed, patient unable to make decisions on her own   - consult pending; pt may need to transition from [facility] to long term SNF placement  -pt does not appear to have an acute infection, she is afebrile, no wbc  -F/U urine culture  -PT eval pending to assess for gait stability    #Asthma/hx COPD  -Continue with Symbicort daily and albuterol prn  -pt refusing CXR upon admission    #DVT prophylaxis  -Lovenox       GOC;  FULL CODE    Dispo:  per extensive conversation with Daughter Marimar she does not want pt to go to [facility], and she will take her home today. 87 y/o F with PMH of asthma and dementia  was brought in by EMS from home, patient was found locked out of her house.  Patient being admitted for social issues and placement.     #Dementia   -per conversation with daughter Marimar she wants to take her Mother home, she cares for her  -Reorient as needed, patient unable to make decisions on her own   -pt does not appear to have an acute infection, she is afebrile, no wbc  -F/U urine culture  -PT eval noted; per daughter she can go to outpatient PT    #Blepharitis left eye  -bacitracin ointment ordered    #Asthma/hx COPD  -Continue with Symbicort daily and albuterol prn  -pt refusing CXR upon admission    #DVT prophylaxis  -Lovenox       GOC;  FULL CODE    Dispo:  per extensive conversation with Daughter Marimar she does not want pt to go to [facility], and she will take her home today.

## 2023-09-28 NOTE — PROGRESS NOTE ADULT - SUBJECTIVE AND OBJECTIVE BOX
Patient is a 88y old  Female who presents with a chief complaint of Problem related to social environment     (27 Sep 2023 13:04)      Patient seen and examined at bedside. No overnight events reported.     ALLERGIES:  No Known Allergies    MEDICATIONS  (STANDING):  budesonide  80 MICROgram(s)/formoterol 4.5 MICROgram(s) Inhaler 2 Puff(s) Inhalation two times a day  enoxaparin Injectable 40 milliGRAM(s) SubCutaneous every 24 hours  influenza  Vaccine (HIGH DOSE) 0.7 milliLiter(s) IntraMuscular once    MEDICATIONS  (PRN):  albuterol    90 MICROgram(s) HFA Inhaler 2 Puff(s) Inhalation every 6 hours PRN Shortness of Breath and/or Wheezing    Vital Signs Last 24 Hrs  T(F): 98 (28 Sep 2023 05:02), Max: 98.3 (27 Sep 2023 20:42)  HR: 74 (28 Sep 2023 05:02) (74 - 91)  BP: 123/73 (28 Sep 2023 05:02) (111/75 - 123/73)  RR: 16 (28 Sep 2023 05:02) (16 - 18)  SpO2: 93% (28 Sep 2023 05:02) (93% - 97%)  I&O's Summary    27 Sep 2023 07:01  -  28 Sep 2023 07:00  --------------------------------------------------------  IN: 200 mL / OUT: 1000 mL / NET: -800 mL      PHYSICAL EXAM:  General: NAD, alert and pleasantly confused.  ENT: No gross hearing impairment, Moist mucous membranes, no thrush  Neck: Supple, No JVD  Lungs: Clear to auscultation bilaterally, good air entry, non-labored breathing  Cardio: RRR, S1/S2, No murmur  Abdomen: Soft, Nontender, Nondistended; Bowel sounds present  Extremities: No calf tenderness, No cyanosis, No pitting edema  Psych: Appropriate mood and affect    LABS:                        13.3   9.94  )-----------( 316      ( 26 Sep 2023 14:40 )             40.3     09-    140  |  103  |  25  ----------------------------<  98  4.1   |  32  |  0.90    Ca    9.5      26 Sep 2023 14:40    TPro  7.1  /  Alb  3.7  /  TBili  0.5  /  DBili  x   /  AST  23  /  ALT  20  /  AlkPhos  74  09-          Urinalysis Basic - ( 26 Sep 2023 17:51 )    Color: Yellow / Appearance: Clear / S.013 / pH: x  Gluc: x / Ketone: 15 mg/dL  / Bili: Negative / Urobili: 0.2 mg/dL   Blood: x / Protein: Trace mg/dL / Nitrite: Negative   Leuk Esterase: Small / RBC: >50 /HPF / WBC 1 /HPF   Sq Epi: x / Non Sq Epi: x / Bacteria: Occasional /HPF          RADIOLOGY & ADDITIONAL TESTS:    Care Discussed with Consultants/Other Providers:    Patient is a 88y old  Female who presents with a chief complaint of Problem related to social environment     (27 Sep 2023 13:04)      Patient seen and examined at bedside. No overnight events reported. She c/o redness and itchiness at left eyelid.    ALLERGIES:  No Known Allergies    MEDICATIONS  (STANDING):  budesonide  80 MICROgram(s)/formoterol 4.5 MICROgram(s) Inhaler 2 Puff(s) Inhalation two times a day  enoxaparin Injectable 40 milliGRAM(s) SubCutaneous every 24 hours  influenza  Vaccine (HIGH DOSE) 0.7 milliLiter(s) IntraMuscular once    MEDICATIONS  (PRN):  albuterol    90 MICROgram(s) HFA Inhaler 2 Puff(s) Inhalation every 6 hours PRN Shortness of Breath and/or Wheezing    Vital Signs Last 24 Hrs  T(F): 98 (28 Sep 2023 05:02), Max: 98.3 (27 Sep 2023 20:42)  HR: 74 (28 Sep 2023 05:02) (74 - 91)  BP: 123/73 (28 Sep 2023 05:02) (111/75 - 123/73)  RR: 16 (28 Sep 2023 05:02) (16 - 18)  SpO2: 93% (28 Sep 2023 05:02) (93% - 97%)  I&O's Summary    27 Sep 2023 07:01  -  28 Sep 2023 07:00  --------------------------------------------------------  IN: 200 mL / OUT: 1000 mL / NET: -800 mL      PHYSICAL EXAM:  General: NAD, alert and pleasantly confused.  She is alert x1-2.  HEENT: left eye with erythema on eyelid, No gross hearing impairment, Moist mucous membranes, no thrush  Neck: Supple, No JVD  Lungs: Clear to auscultation bilaterally, good air entry, non-labored breathing  Cardio: RRR, S1/S2, No murmur  Abdomen: Soft, Nontender, Nondistended; Bowel sounds present  Extremities: No calf tenderness, No cyanosis, No pitting edema  Psych: Appropriate mood and affect    LABS:                        13.3   9.94  )-----------( 316      ( 26 Sep 2023 14:40 )             40.3         140  |  103  |  25  ----------------------------<  98  4.1   |  32  |  0.90    Ca    9.5      26 Sep 2023 14:40    TPro  7.1  /  Alb  3.7  /  TBili  0.5  /  DBili  x   /  AST  23  /  ALT  20  /  AlkPhos  74            Urinalysis Basic - ( 26 Sep 2023 17:51 )    Color: Yellow / Appearance: Clear / S.013 / pH: x  Gluc: x / Ketone: 15 mg/dL  / Bili: Negative / Urobili: 0.2 mg/dL   Blood: x / Protein: Trace mg/dL / Nitrite: Negative   Leuk Esterase: Small / RBC: >50 /HPF / WBC 1 /HPF   Sq Epi: x / Non Sq Epi: x / Bacteria: Occasional /HPF          RADIOLOGY & ADDITIONAL TESTS:    Care Discussed with Consultants/Other Providers:

## 2023-09-28 NOTE — DISCHARGE NOTE PROVIDER - NSDCCPCAREPLAN_GEN_ALL_CORE_FT
PRINCIPAL DISCHARGE DIAGNOSIS  Diagnosis: Hospital admission due to social situation  Assessment and Plan of Treatment: -you are being safely discharged home with your Daughter Marimar  -follow up with your PCP within 1 week      SECONDARY DISCHARGE DIAGNOSES  Diagnosis: Acute blepharitis  Assessment and Plan of Treatment: -continue antibiotic ointment for the next 3 days

## 2023-12-23 ENCOUNTER — INPATIENT (INPATIENT)
Facility: HOSPITAL | Age: 88
LOS: 5 days | Discharge: NURSING, REHAB OR LONG-TERM CARE | DRG: 641 | End: 2023-12-29
Attending: INTERNAL MEDICINE | Admitting: FAMILY MEDICINE
Payer: MEDICARE

## 2023-12-23 VITALS
DIASTOLIC BLOOD PRESSURE: 68 MMHG | WEIGHT: 149.91 LBS | HEIGHT: 65 IN | TEMPERATURE: 98 F | SYSTOLIC BLOOD PRESSURE: 102 MMHG | RESPIRATION RATE: 18 BRPM | OXYGEN SATURATION: 97 % | HEART RATE: 92 BPM

## 2023-12-23 DIAGNOSIS — R62.7 ADULT FAILURE TO THRIVE: ICD-10-CM

## 2023-12-23 PROBLEM — J44.9 CHRONIC OBSTRUCTIVE PULMONARY DISEASE, UNSPECIFIED: Chronic | Status: ACTIVE | Noted: 2023-09-26

## 2023-12-23 LAB
ALBUMIN SERPL ELPH-MCNC: 3.4 G/DL — SIGNIFICANT CHANGE UP (ref 3.3–5)
ALBUMIN SERPL ELPH-MCNC: 3.4 G/DL — SIGNIFICANT CHANGE UP (ref 3.3–5)
ALP SERPL-CCNC: 75 U/L — SIGNIFICANT CHANGE UP (ref 40–120)
ALP SERPL-CCNC: 75 U/L — SIGNIFICANT CHANGE UP (ref 40–120)
ALT FLD-CCNC: 16 U/L — SIGNIFICANT CHANGE UP (ref 10–45)
ALT FLD-CCNC: 16 U/L — SIGNIFICANT CHANGE UP (ref 10–45)
ANION GAP SERPL CALC-SCNC: 14 MMOL/L — SIGNIFICANT CHANGE UP (ref 5–17)
ANION GAP SERPL CALC-SCNC: 14 MMOL/L — SIGNIFICANT CHANGE UP (ref 5–17)
APTT BLD: 29.8 SEC — SIGNIFICANT CHANGE UP (ref 24.5–35.6)
APTT BLD: 29.8 SEC — SIGNIFICANT CHANGE UP (ref 24.5–35.6)
AST SERPL-CCNC: 16 U/L — SIGNIFICANT CHANGE UP (ref 10–40)
AST SERPL-CCNC: 16 U/L — SIGNIFICANT CHANGE UP (ref 10–40)
BASE EXCESS BLDV CALC-SCNC: -0.4 MMOL/L — SIGNIFICANT CHANGE UP (ref -2–3)
BASE EXCESS BLDV CALC-SCNC: -0.4 MMOL/L — SIGNIFICANT CHANGE UP (ref -2–3)
BASOPHILS # BLD AUTO: 0.11 K/UL — SIGNIFICANT CHANGE UP (ref 0–0.2)
BASOPHILS # BLD AUTO: 0.11 K/UL — SIGNIFICANT CHANGE UP (ref 0–0.2)
BASOPHILS NFR BLD AUTO: 1.5 % — SIGNIFICANT CHANGE UP (ref 0–2)
BASOPHILS NFR BLD AUTO: 1.5 % — SIGNIFICANT CHANGE UP (ref 0–2)
BILIRUB SERPL-MCNC: 0.8 MG/DL — SIGNIFICANT CHANGE UP (ref 0.2–1.2)
BILIRUB SERPL-MCNC: 0.8 MG/DL — SIGNIFICANT CHANGE UP (ref 0.2–1.2)
BUN SERPL-MCNC: 16 MG/DL — SIGNIFICANT CHANGE UP (ref 7–23)
BUN SERPL-MCNC: 16 MG/DL — SIGNIFICANT CHANGE UP (ref 7–23)
CALCIUM SERPL-MCNC: 9.6 MG/DL — SIGNIFICANT CHANGE UP (ref 8.4–10.5)
CALCIUM SERPL-MCNC: 9.6 MG/DL — SIGNIFICANT CHANGE UP (ref 8.4–10.5)
CHLORIDE SERPL-SCNC: 100 MMOL/L — SIGNIFICANT CHANGE UP (ref 96–108)
CHLORIDE SERPL-SCNC: 100 MMOL/L — SIGNIFICANT CHANGE UP (ref 96–108)
CO2 BLDV-SCNC: 26 MMOL/L — SIGNIFICANT CHANGE UP (ref 22–26)
CO2 BLDV-SCNC: 26 MMOL/L — SIGNIFICANT CHANGE UP (ref 22–26)
CO2 SERPL-SCNC: 22 MMOL/L — SIGNIFICANT CHANGE UP (ref 22–31)
CO2 SERPL-SCNC: 22 MMOL/L — SIGNIFICANT CHANGE UP (ref 22–31)
CREAT SERPL-MCNC: 0.72 MG/DL — SIGNIFICANT CHANGE UP (ref 0.5–1.3)
CREAT SERPL-MCNC: 0.72 MG/DL — SIGNIFICANT CHANGE UP (ref 0.5–1.3)
EGFR: 80 ML/MIN/1.73M2 — SIGNIFICANT CHANGE UP
EGFR: 80 ML/MIN/1.73M2 — SIGNIFICANT CHANGE UP
EOSINOPHIL # BLD AUTO: 0.25 K/UL — SIGNIFICANT CHANGE UP (ref 0–0.5)
EOSINOPHIL # BLD AUTO: 0.25 K/UL — SIGNIFICANT CHANGE UP (ref 0–0.5)
EOSINOPHIL NFR BLD AUTO: 3.5 % — SIGNIFICANT CHANGE UP (ref 0–6)
EOSINOPHIL NFR BLD AUTO: 3.5 % — SIGNIFICANT CHANGE UP (ref 0–6)
GLUCOSE SERPL-MCNC: 63 MG/DL — LOW (ref 70–99)
GLUCOSE SERPL-MCNC: 63 MG/DL — LOW (ref 70–99)
HCO3 BLDV-SCNC: 25 MMOL/L — SIGNIFICANT CHANGE UP (ref 22–29)
HCO3 BLDV-SCNC: 25 MMOL/L — SIGNIFICANT CHANGE UP (ref 22–29)
HCT VFR BLD CALC: 41.1 % — SIGNIFICANT CHANGE UP (ref 34.5–45)
HCT VFR BLD CALC: 41.1 % — SIGNIFICANT CHANGE UP (ref 34.5–45)
HGB BLD-MCNC: 13.9 G/DL — SIGNIFICANT CHANGE UP (ref 11.5–15.5)
HGB BLD-MCNC: 13.9 G/DL — SIGNIFICANT CHANGE UP (ref 11.5–15.5)
IMM GRANULOCYTES NFR BLD AUTO: 0.7 % — SIGNIFICANT CHANGE UP (ref 0–0.9)
IMM GRANULOCYTES NFR BLD AUTO: 0.7 % — SIGNIFICANT CHANGE UP (ref 0–0.9)
INR BLD: 0.99 RATIO — SIGNIFICANT CHANGE UP (ref 0.85–1.18)
INR BLD: 0.99 RATIO — SIGNIFICANT CHANGE UP (ref 0.85–1.18)
LACTATE SERPL-SCNC: 0.7 MMOL/L — SIGNIFICANT CHANGE UP (ref 0.7–2)
LACTATE SERPL-SCNC: 0.7 MMOL/L — SIGNIFICANT CHANGE UP (ref 0.7–2)
LIDOCAIN IGE QN: 15 U/L — LOW (ref 16–77)
LIDOCAIN IGE QN: 15 U/L — LOW (ref 16–77)
LYMPHOCYTES # BLD AUTO: 1.41 K/UL — SIGNIFICANT CHANGE UP (ref 1–3.3)
LYMPHOCYTES # BLD AUTO: 1.41 K/UL — SIGNIFICANT CHANGE UP (ref 1–3.3)
LYMPHOCYTES # BLD AUTO: 19.6 % — SIGNIFICANT CHANGE UP (ref 13–44)
LYMPHOCYTES # BLD AUTO: 19.6 % — SIGNIFICANT CHANGE UP (ref 13–44)
MAGNESIUM SERPL-MCNC: 1.7 MG/DL — SIGNIFICANT CHANGE UP (ref 1.6–2.6)
MAGNESIUM SERPL-MCNC: 1.7 MG/DL — SIGNIFICANT CHANGE UP (ref 1.6–2.6)
MCHC RBC-ENTMCNC: 31.8 PG — SIGNIFICANT CHANGE UP (ref 27–34)
MCHC RBC-ENTMCNC: 31.8 PG — SIGNIFICANT CHANGE UP (ref 27–34)
MCHC RBC-ENTMCNC: 33.8 GM/DL — SIGNIFICANT CHANGE UP (ref 32–36)
MCHC RBC-ENTMCNC: 33.8 GM/DL — SIGNIFICANT CHANGE UP (ref 32–36)
MCV RBC AUTO: 94.1 FL — SIGNIFICANT CHANGE UP (ref 80–100)
MCV RBC AUTO: 94.1 FL — SIGNIFICANT CHANGE UP (ref 80–100)
MONOCYTES # BLD AUTO: 0.67 K/UL — SIGNIFICANT CHANGE UP (ref 0–0.9)
MONOCYTES # BLD AUTO: 0.67 K/UL — SIGNIFICANT CHANGE UP (ref 0–0.9)
MONOCYTES NFR BLD AUTO: 9.3 % — SIGNIFICANT CHANGE UP (ref 2–14)
MONOCYTES NFR BLD AUTO: 9.3 % — SIGNIFICANT CHANGE UP (ref 2–14)
NEUTROPHILS # BLD AUTO: 4.72 K/UL — SIGNIFICANT CHANGE UP (ref 1.8–7.4)
NEUTROPHILS # BLD AUTO: 4.72 K/UL — SIGNIFICANT CHANGE UP (ref 1.8–7.4)
NEUTROPHILS NFR BLD AUTO: 65.4 % — SIGNIFICANT CHANGE UP (ref 43–77)
NEUTROPHILS NFR BLD AUTO: 65.4 % — SIGNIFICANT CHANGE UP (ref 43–77)
NRBC # BLD: 0 /100 WBCS — SIGNIFICANT CHANGE UP (ref 0–0)
NRBC # BLD: 0 /100 WBCS — SIGNIFICANT CHANGE UP (ref 0–0)
NT-PROBNP SERPL-SCNC: 66 PG/ML — SIGNIFICANT CHANGE UP (ref 0–300)
NT-PROBNP SERPL-SCNC: 66 PG/ML — SIGNIFICANT CHANGE UP (ref 0–300)
PCO2 BLDV: 43 MMHG — HIGH (ref 39–42)
PCO2 BLDV: 43 MMHG — HIGH (ref 39–42)
PH BLDV: 7.37 — SIGNIFICANT CHANGE UP (ref 7.32–7.43)
PH BLDV: 7.37 — SIGNIFICANT CHANGE UP (ref 7.32–7.43)
PLATELET # BLD AUTO: 328 K/UL — SIGNIFICANT CHANGE UP (ref 150–400)
PLATELET # BLD AUTO: 328 K/UL — SIGNIFICANT CHANGE UP (ref 150–400)
PO2 BLDV: <35 MMHG — LOW (ref 25–45)
PO2 BLDV: <35 MMHG — LOW (ref 25–45)
POTASSIUM SERPL-MCNC: 3.9 MMOL/L — SIGNIFICANT CHANGE UP (ref 3.5–5.3)
POTASSIUM SERPL-MCNC: 3.9 MMOL/L — SIGNIFICANT CHANGE UP (ref 3.5–5.3)
POTASSIUM SERPL-SCNC: 3.9 MMOL/L — SIGNIFICANT CHANGE UP (ref 3.5–5.3)
POTASSIUM SERPL-SCNC: 3.9 MMOL/L — SIGNIFICANT CHANGE UP (ref 3.5–5.3)
PROT SERPL-MCNC: 6.8 G/DL — SIGNIFICANT CHANGE UP (ref 6–8.3)
PROT SERPL-MCNC: 6.8 G/DL — SIGNIFICANT CHANGE UP (ref 6–8.3)
PROTHROM AB SERPL-ACNC: 11.6 SEC — SIGNIFICANT CHANGE UP (ref 9.5–13)
PROTHROM AB SERPL-ACNC: 11.6 SEC — SIGNIFICANT CHANGE UP (ref 9.5–13)
RAPID RVP RESULT: SIGNIFICANT CHANGE UP
RAPID RVP RESULT: SIGNIFICANT CHANGE UP
RBC # BLD: 4.37 M/UL — SIGNIFICANT CHANGE UP (ref 3.8–5.2)
RBC # BLD: 4.37 M/UL — SIGNIFICANT CHANGE UP (ref 3.8–5.2)
RBC # FLD: 13.1 % — SIGNIFICANT CHANGE UP (ref 10.3–14.5)
RBC # FLD: 13.1 % — SIGNIFICANT CHANGE UP (ref 10.3–14.5)
SAO2 % BLDV: 31 % — LOW (ref 67–88)
SAO2 % BLDV: 31 % — LOW (ref 67–88)
SARS-COV-2 RNA SPEC QL NAA+PROBE: SIGNIFICANT CHANGE UP
SARS-COV-2 RNA SPEC QL NAA+PROBE: SIGNIFICANT CHANGE UP
SODIUM SERPL-SCNC: 136 MMOL/L — SIGNIFICANT CHANGE UP (ref 135–145)
SODIUM SERPL-SCNC: 136 MMOL/L — SIGNIFICANT CHANGE UP (ref 135–145)
TROPONIN I, HIGH SENSITIVITY RESULT: 33.4 NG/L — SIGNIFICANT CHANGE UP
TROPONIN I, HIGH SENSITIVITY RESULT: 33.4 NG/L — SIGNIFICANT CHANGE UP
WBC # BLD: 7.21 K/UL — SIGNIFICANT CHANGE UP (ref 3.8–10.5)
WBC # BLD: 7.21 K/UL — SIGNIFICANT CHANGE UP (ref 3.8–10.5)
WBC # FLD AUTO: 7.21 K/UL — SIGNIFICANT CHANGE UP (ref 3.8–10.5)
WBC # FLD AUTO: 7.21 K/UL — SIGNIFICANT CHANGE UP (ref 3.8–10.5)

## 2023-12-23 PROCEDURE — 99223 1ST HOSP IP/OBS HIGH 75: CPT | Mod: GC

## 2023-12-23 PROCEDURE — 93010 ELECTROCARDIOGRAM REPORT: CPT

## 2023-12-23 PROCEDURE — 99285 EMERGENCY DEPT VISIT HI MDM: CPT

## 2023-12-23 PROCEDURE — 71045 X-RAY EXAM CHEST 1 VIEW: CPT | Mod: 26

## 2023-12-23 RX ORDER — ACETAMINOPHEN 500 MG
650 TABLET ORAL EVERY 6 HOURS
Refills: 0 | Status: DISCONTINUED | OUTPATIENT
Start: 2023-12-23 | End: 2023-12-29

## 2023-12-23 RX ORDER — LANOLIN ALCOHOL/MO/W.PET/CERES
3 CREAM (GRAM) TOPICAL AT BEDTIME
Refills: 0 | Status: DISCONTINUED | OUTPATIENT
Start: 2023-12-23 | End: 2023-12-29

## 2023-12-23 RX ORDER — FOLIC ACID 0.8 MG
1 TABLET ORAL DAILY
Refills: 0 | Status: DISCONTINUED | OUTPATIENT
Start: 2023-12-23 | End: 2023-12-29

## 2023-12-23 RX ORDER — ASPIRIN/CALCIUM CARB/MAGNESIUM 324 MG
81 TABLET ORAL DAILY
Refills: 0 | Status: DISCONTINUED | OUTPATIENT
Start: 2023-12-23 | End: 2023-12-29

## 2023-12-23 RX ORDER — ASPIRIN/CALCIUM CARB/MAGNESIUM 324 MG
1 TABLET ORAL
Refills: 0 | DISCHARGE

## 2023-12-23 RX ORDER — THIAMINE MONONITRATE (VIT B1) 100 MG
100 TABLET ORAL ONCE
Refills: 0 | Status: COMPLETED | OUTPATIENT
Start: 2023-12-23 | End: 2023-12-24

## 2023-12-23 RX ORDER — ONDANSETRON 8 MG/1
4 TABLET, FILM COATED ORAL EVERY 8 HOURS
Refills: 0 | Status: DISCONTINUED | OUTPATIENT
Start: 2023-12-23 | End: 2023-12-29

## 2023-12-23 RX ORDER — NICOTINE POLACRILEX 2 MG
1 GUM BUCCAL DAILY
Refills: 0 | Status: DISCONTINUED | OUTPATIENT
Start: 2023-12-24 | End: 2023-12-29

## 2023-12-23 RX ORDER — NICOTINE POLACRILEX 2 MG
2 GUM BUCCAL
Refills: 0 | Status: DISCONTINUED | OUTPATIENT
Start: 2023-12-24 | End: 2023-12-29

## 2023-12-23 RX ORDER — SODIUM CHLORIDE 9 MG/ML
1000 INJECTION, SOLUTION INTRAVENOUS
Refills: 0 | Status: DISCONTINUED | OUTPATIENT
Start: 2023-12-23 | End: 2023-12-24

## 2023-12-23 RX ORDER — QUETIAPINE FUMARATE 200 MG/1
12.5 TABLET, FILM COATED ORAL AT BEDTIME
Refills: 0 | Status: DISCONTINUED | OUTPATIENT
Start: 2023-12-23 | End: 2023-12-29

## 2023-12-23 NOTE — H&P ADULT - ASSESSMENT
89 yo F with history of COPD, dementia and nicotine dependence presents for 1 week history of weakness. Patient admitted for evaluation due to inability to safely return home as daughter also being admitted.    #Weakness  -Rule out Failure To Thrive  -Blood work unremarkable  -Mild decrease lipase levels  -IVF maintenance 75ml/hr   -OT, PT consults  -Regular diet after bedside swallow testing    #Dementia  -Psychiatry consult  -F/up B12, Folate levels  -F/up Mg+, phosphate levels    #Possible EtOH Dependence  -Start CIWA protocol symptom trigger  -Thiamine IV once, then daily PO  -Folate daily  -Seizure precautions  -Aspiration precautions      #Status code  -Full code  -Daughter aware of patient's admission

## 2023-12-23 NOTE — ED ADULT NURSE NOTE - OBJECTIVE STATEMENT
Assumed pt care for a 88 yr old female complaining of weakness. Pt reports she has not been feeling well the past few days. My daughter presents with similar symptoms. Pt denies any further complaints. IV established. Labs collected.

## 2023-12-23 NOTE — H&P ADULT - ATTENDING COMMENTS
88 year old female with h/o dementia, seasonal allergies comes to ED with dtr for c/o generalized weakness, FTT, labs reviewed- acceptable, patient has unstable gait, almost fell in the Ed. pt lives with dtr who is now admitted and pt is unable to be d/c home due to dementia and unsteady gait. unable to take care of self    history obtained by dtr- who reports that pt  is a smoker and drinks vodka and hides it in the house, pt cognitive function has declined a lot over the last    2 yrs.  she is aggressive at times and threatens to throw all of dtr stuff out if she decides to leave her mother. dtr also reports hallucinations and at times pt thinks that her dtr is her sister or her son. pt refuses to take meds, she does take asa 81 daily no clear indication.    Vital Signs Last 24 Hrs  T(C): 36.6 (23 Dec 2023 14:36), Max: 36.6 (23 Dec 2023 14:36)  T(F): 97.8 (23 Dec 2023 14:36), Max: 97.9 (23 Dec 2023 14:36)  HR: 92 (23 Dec 2023 14:36) (92 - 92)  BP: 102/68 (23 Dec 2023 14:36) (102/68 - 102/68)  BP(mean): --  RR: 18 (23 Dec 2023 14:36) (18 - 18)  SpO2: 97% (23 Dec 2023 14:36) (97% - 97%)    Parameters below as of 23 Dec 2023 14:36  Patient On (Oxygen Delivery Method): room air      GENERAL- NAD  EAR/NOSE/MOUTH/THROAT -  MMM  EYES- ROBBIE, conjunctiva and Sclera clear  NECK- supple  RESPIRATORY-  clear to auscultation bilaterally, non laboured breathing  CARDIOVASCULAR - SIS2, RRR  GI - soft NT BS present  EXTREMITIES- no pedal edema  NEURO- no gross new deficits  PSYCHIATRY- AAO X 1, confused                13.9                 136  | 22   | 16           7.21  >-----------< 328     ------------------------< 63                    41.1                 3.9  | 100  | 0.72                                         Ca 9.6   Mg 1.7   Ph x          Urinalysis (09.26.23 @ 17:51)    Glucose Qualitative, Urine: Negative mg/dL    Blood, Urine: Large    pH Urine: 6.0    Color: Yellow    Urine Appearance: Clear    Bilirubin: Negative    Ketone - Urine: 15 mg/dL    Specific Gravity: 1.013    Protein, Urine: Trace mg/dL    Urobilinogen: 0.2 mg/dL    Nitrite: Negative    Leukocyte Esterase Concentration: Small      Labs reviewed:     CXR personally reviewed: napd    ECG reviewed and interpreted: sinus      a/p-  gait imbalance  worsening dementia  admit to dexter  fall and asp precautions  s/w consult  pt/ot in am  unclear indication for asa 81- will hold - no cardiac stents, no cva per dtr  am labs  check b12, folate, mag, phos    dementia-  per dtr pt can become aggressive at times and h/o smoking and etoh abuse  will order ativan prn and Seroquel qhs     vte ppx- pas 88 year old female with h/o dementia, seasonal allergies comes to ED with dtr for c/o generalized weakness, FTT, labs reviewed- acceptable, patient has unstable gait, almost fell in the Ed. pt lives with dtr who is now admitted and pt is unable to be d/c home due to dementia and unsteady gait. unable to take care of self    history obtained by dtr (Marimar, who is also admitted today)- who lives with pt, reports that pt  is a smoker and drinks vodka and hides it in the house,   patients cognitive function has declined a lot over the last  2 yrs.  dtr also reports hallucinations and at times pt thinks that her dtr is her sister or her son. pt refuses to take meds,   she does take asa 81 daily no clear indication.    per Marimar - pt has another dtr and a son- unable to reach them at this time  son Estevan- [phone] home, [phone] cell [facility]   2nd daughter- Caitie- [phone]      Vital Signs Last 24 Hrs  T(C): 36.6 (23 Dec 2023 14:36), Max: 36.6 (23 Dec 2023 14:36)  T(F): 97.8 (23 Dec 2023 14:36), Max: 97.9 (23 Dec 2023 14:36)  HR: 92 (23 Dec 2023 14:36) (92 - 92)  BP: 102/68 (23 Dec 2023 14:36) (102/68 - 102/68)  BP(mean): --  RR: 18 (23 Dec 2023 14:36) (18 - 18)  SpO2: 97% (23 Dec 2023 14:36) (97% - 97%)    Parameters below as of 23 Dec 2023 14:36  Patient On (Oxygen Delivery Method): room air      GENERAL- NAD  EAR/NOSE/MOUTH/THROAT -  MMM  EYES- ROBBIE, conjunctiva and Sclera clear  NECK- supple  RESPIRATORY-  clear to auscultation bilaterally, non laboured breathing  CARDIOVASCULAR - SIS2, RRR  GI - soft NT BS present  EXTREMITIES- no pedal edema  NEURO- no gross new deficits  PSYCHIATRY- AAO X 1, confused                13.9                 136  | 22   | 16           7.21  >-----------< 328     ------------------------< 63                    41.1                 3.9  | 100  | 0.72                                         Ca 9.6   Mg 1.7   Ph x          Urinalysis (09.26.23 @ 17:51)    Glucose Qualitative, Urine: Negative mg/dL    Blood, Urine: Large    pH Urine: 6.0    Color: Yellow    Urine Appearance: Clear    Bilirubin: Negative    Ketone - Urine: 15 mg/dL    Specific Gravity: 1.013    Protein, Urine: Trace mg/dL    Urobilinogen: 0.2 mg/dL    Nitrite: Negative    Leukocyte Esterase Concentration: Small      Labs reviewed:     CXR personally reviewed: napd    ECG reviewed and interpreted: sinus      a/p-  gait imbalance  worsening dementia  admit to dexter  fall and asp precautions  s/w consult  pt/ot in am  unclear indication for asa 81- will hold - no cardiac stents, no cva per dtr  am labs  check b12, folate, mag, phos  ciwa protocol    dementia-  per dtr pt can become aggressive at times and h/o smoking and etoh abuse  will order ativan prn and Seroquel qhs     vte ppx- pas 88 year old female with h/o dementia, seasonal allergies comes to ED with dtr for c/o generalized weakness, FTT, labs reviewed- acceptable, patient has unstable gait, almost fell in the Ed. pt lives with dtr who is now admitted and pt is unable to be d/c home due to dementia and unsteady gait. unable to take care of self    history obtained by dtr (Marimar, who is also admitted today)- who lives with pt, reports that pt  is a smoker and drinks vodka and hides it in the house,   patients cognitive function has declined a lot over the last  2 yrs.  dtr also reports hallucinations and at times pt thinks that her dtr is her sister or her son. pt refuses to take meds,   she does take asa 81 daily no clear indication.    per Marimar - pt has another dtr and a son- unable to reach them at this time  son Estevan- [phone] home, [phone] cell [facility]   2nd daughter- Caitie- [phone]      Vital Signs Last 24 Hrs  T(C): 36.6 (23 Dec 2023 14:36), Max: 36.6 (23 Dec 2023 14:36)  T(F): 97.8 (23 Dec 2023 14:36), Max: 97.9 (23 Dec 2023 14:36)  HR: 92 (23 Dec 2023 14:36) (92 - 92)  BP: 102/68 (23 Dec 2023 14:36) (102/68 - 102/68)  BP(mean): --  RR: 18 (23 Dec 2023 14:36) (18 - 18)  SpO2: 97% (23 Dec 2023 14:36) (97% - 97%)    Parameters below as of 23 Dec 2023 14:36  Patient On (Oxygen Delivery Method): room air      GENERAL- NAD  EAR/NOSE/MOUTH/THROAT -  MMM  EYES- ROBBIE, conjunctiva and Sclera clear  NECK- supple  RESPIRATORY-  clear to auscultation bilaterally, non laboured breathing  CARDIOVASCULAR - SIS2, RRR  GI - soft NT BS present  EXTREMITIES- no pedal edema  NEURO- no gross new deficits  PSYCHIATRY- AAO X 1, confused                13.9                 136  | 22   | 16           7.21  >-----------< 328     ------------------------< 63                    41.1                 3.9  | 100  | 0.72                                         Ca 9.6   Mg 1.7   Ph x          Urinalysis (09.26.23 @ 17:51)    Glucose Qualitative, Urine: Negative mg/dL    Blood, Urine: Large    pH Urine: 6.0    Color: Yellow    Urine Appearance: Clear    Bilirubin: Negative    Ketone - Urine: 15 mg/dL    Specific Gravity: 1.013    Protein, Urine: Trace mg/dL    Urobilinogen: 0.2 mg/dL    Nitrite: Negative    Leukocyte Esterase Concentration: Small      Labs reviewed:     CXR personally reviewed: napd    ECG reviewed and interpreted: sinus      a/p-  gait imbalance  worsening dementia  admit to dexter  fall and asp precautions  s/w consult  pt/ot in am  unclear indication for asa 81- will hold - no cardiac stents, no cva per dtr  am labs  check b12, folate, mag, phos  ciwa protocol    dementia-  per dtr pt can become aggressive at times and h/o smoking and etoh abuse  will order ativan prn and Seroquel qhs     vte ppx- pas

## 2023-12-23 NOTE — H&P ADULT - NSHPPHYSICALEXAM_GEN_ALL_CORE
GENERAL: NAD, lying in bed comfortably  HEAD:  Atraumatic, Normocephalic  EYES: EOMI, PERRLA, conjunctiva and sclera clear  ENT: Moist mucous membranes  NECK: Supple, No JVD  CHEST/LUNG: Clear to auscultation bilaterally, good air entry bilaterally; No wheezing, rales, or rhonchi. Unlabored respirations  HEART: Regular rate and rhythm. S1 and S2. No murmurs, rubs, or gallops  ABDOMEN: Soft, Nontender, Nondistended. Bowel sounds present x4 quadrants; No hepatomegaly. No splenomegaly.  EXTREMITIES:  2+ Peripheral Pulses. Capillary refill <2 seconds. No clubbing, cyanosis, or edema  NERVOUS SYSTEM:  Alert & Oriented X3, speech clear. No deficits   MSK: FROM all 4 extremities, full and equal strength  SKIN: No rashes, bruises, or other lesions GENERAL: NAD, lying in bed comfortably  HEAD:  Atraumatic, Normocephalic  EYES: EOMI, PERRLA, conjunctiva and sclera clear  ENT: Moist mucous membranes  NECK: Supple, No JVD  CHEST/LUNG: Clear to auscultation bilaterally, good air entry bilaterally; No wheezing, rales, or rhonchi. Unlabored respirations  HEART: Regular rate and rhythm. S1 and S2. No murmurs, rubs, or gallops  ABDOMEN: Soft, Nontender, Nondistended. Bowel sounds present x4 quadrants; No hepatomegaly. No splenomegaly.  EXTREMITIES:  2+ Peripheral Pulses. Capillary refill <2 seconds. No clubbing, cyanosis, or edema  NERVOUS SYSTEM:  Alert & Oriented X1, speech clear. No deficits

## 2023-12-23 NOTE — ED ADULT NURSE NOTE - NSFALLHARMRISKINTERV_ED_ALL_ED
Assistance OOB with selected safe patient handling equipment if applicable/Assistance with ambulation/Communicate risk of Fall with Harm to all staff, patient, and family/Provide visual cue: red socks, yellow wristband, yellow gown, etc/Reinforce activity limits and safety measures with patient and family/Bed in lowest position, wheels locked, appropriate side rails in place/Call bell, personal items and telephone in reach/Instruct patient to call for assistance before getting out of bed/chair/stretcher/Non-slip footwear applied when patient is off stretcher/[place] to call system/Physically safe environment - no spills, clutter or unnecessary equipment/Purposeful Proactive Rounding/Room/bathroom lighting operational, light cord in reach Assistance OOB with selected safe patient handling equipment if applicable/Assistance with ambulation/Communicate risk of Fall with Harm to all staff, patient, and family/Provide visual cue: red socks, yellow wristband, yellow gown, etc/Reinforce activity limits and safety measures with patient and family/Bed in lowest position, wheels locked, appropriate side rails in place/Call bell, personal items and telephone in reach/Instruct patient to call for assistance before getting out of bed/chair/stretcher/Non-slip footwear applied when patient is off stretcher/[place] to call system/Physically safe environment - no spills, clutter or unnecessary equipment/Purposeful Proactive Rounding/Room/bathroom lighting operational, light cord in reach

## 2023-12-23 NOTE — ED PROVIDER NOTE - CLINICAL SUMMARY MEDICAL DECISION MAKING FREE TEXT BOX
88 year old female with generalized weakness, FTT, labs reviewed- acceptable, patient tolerated po well in the Ed, wants to be discharged, explained that we need UA to r/o UTI and patient states that she is 100% positive that she doesn't' have UTI and will sign anything and wants to leave. pending RVP, will Call only if positve and she voiced good understanding 88 year old female with generalized weakness, FTT, labs reviewed- acceptable, patient has unstable gait, almost fell in the Ed, will admit and PT evaluation

## 2023-12-23 NOTE — H&P ADULT - NSHPREVIEWOFSYSTEMS_GEN_ALL_CORE
REVIEW OF SYSTEMS:    CONSTITUTIONAL: (+) weakness, (-) fevers, (-) chills, (-) coughing (-)sneezing, (+) fatigue  EYES/ENT: (-) visual changes,  (-) vertigo,  (-) throat pain    NECK:  (-) pain, (-) stiffness  RESPIRATORY:  (-) shortness of breath, (-) cough,  (-) wheezing,  (-) hemoptysis   CARDIOVASCULAR:  (-) chest pain, (-) palpitations  GASTROINTESTINAL:  (-) abdominal or epigastric pain, (-) nausea, (-) vomiting, (-) diarrhea, (-) constipation, (-) melena,  (-) hematemesis,  (-) hematochezia  GENITOURINARY: (-) dysuria, (-) frequency, (-) hematuria  NEUROLOGICAL: (-) numbness, (-) weakness  SKIN: (-) itching, (-) rashes, (-) lesions

## 2023-12-23 NOTE — ED ADULT TRIAGE NOTE - CHIEF COMPLAINT QUOTE
Patient brought in by EMS from home with complaint of weakness and dizzy spills for the past two weeks. Patient tested positive for COVID early this month. Denies any recent falls, trauma or hitting her head. Denies any chest pain or SOB.

## 2023-12-23 NOTE — ED PROVIDER NOTE - PATIENT PORTAL LINK FT
You can access the FollowMyHealth Patient Portal offered by [facility] by registering at the following website: [email or web address]. By joining "[employer]"’s FollowMyHealth portal, you will also be able to view your health information using other applications (apps) compatible with our system. You can access the FollowMyHealth Patient Portal offered by [facility] by registering at the following website: [email or web address]. By joining [facility]’s FollowMyHealth portal, you will also be able to view your health information using other applications (apps) compatible with our system.

## 2023-12-23 NOTE — ED PROVIDER NOTE - NSFOLLOWUPINSTRUCTIONS_ED_ALL_ED_FT
please follow up with your physician in 1-3 days    return to the nearest ED immediately with any new/worsening symptoms

## 2023-12-23 NOTE — PATIENT PROFILE ADULT - FALL HARM RISK - HARM RISK INTERVENTIONS
Assistance with ambulation/Assistance OOB with selected safe patient handling equipment/Communicate Risk of Fall with Harm to all staff/Discuss with provider need for PT consult/Monitor gait and stability/Reinforce activity limits and safety measures with patient and family/Tailored Fall Risk Interventions/Visual Cue: Yellow wristband and red socks/Bed in lowest position, wheels locked, appropriate side rails in place/Call bell, personal items and telephone in reach/Instruct patient to call for assistance before getting out of bed or chair/Non-slip footwear when patient is out of bed/[place] to call system/Physically safe environment - no spills, clutter or unnecessary equipment/Purposeful Proactive Rounding/Room/bathroom lighting operational, light cord in reach Assistance with ambulation/Assistance OOB with selected safe patient handling equipment/Communicate Risk of Fall with Harm to all staff/Discuss with provider need for PT consult/Monitor gait and stability/Reinforce activity limits and safety measures with patient and family/Tailored Fall Risk Interventions/Visual Cue: Yellow wristband and red socks/Bed in lowest position, wheels locked, appropriate side rails in place/Call bell, personal items and telephone in reach/Instruct patient to call for assistance before getting out of bed or chair/Non-slip footwear when patient is out of bed/[place] to call system/Physically safe environment - no spills, clutter or unnecessary equipment/Purposeful Proactive Rounding/Room/bathroom lighting operational, light cord in reach

## 2023-12-23 NOTE — H&P ADULT - HISTORY OF PRESENT ILLNESS
89 yo F with history of 87 yo F with history of 87 yo F with history of COPD and nicotine dependence presents with daughter for weakness. 89 yo F with history of COPD, dementia and nicotine dependence presents with daughter for weakness. Complaint present for 1 week with some cough, but denied fever, headache, chest pain, abdominal pain, urinary complaints or changes in bowel movement. Patient was set to be discharged earlier, but given daughter's admission and inability to contact another family member for , safest decision was to admit patient. Per patient, she currently denies any complaints and states she feels well. Daughter, Marimar Alfredo interviewed to obtain more information whom states patient is daily alcohol consumer (patient disclosed occassional alcohol consumption) and takes only the medications listed. Outpatient records reviewed but provided no additional information. Unable to contact pharmacy to inquire for further medications. 87 yo F with history of COPD, dementia and nicotine dependence presents with daughter for weakness. Complaint present for 1 week with some cough, but denied fever, headache, chest pain, abdominal pain, urinary complaints or changes in bowel movement. Patient was set to be discharged earlier, but given daughter's admission and inability to contact another family member for , safest decision was to admit patient. Per patient, she currently denies any complaints and states she feels well. Daughter, Marimar Alfredo interviewed to obtain more information whom states patient is daily alcohol consumer (patient disclosed occassional alcohol consumption) and takes only the medications listed. Outpatient records reviewed but provided no additional information. Unable to contact pharmacy to inquire for further medications.

## 2023-12-24 LAB
AMPHET UR-MCNC: NEGATIVE — SIGNIFICANT CHANGE UP
AMPHET UR-MCNC: NEGATIVE — SIGNIFICANT CHANGE UP
APPEARANCE UR: ABNORMAL
APPEARANCE UR: ABNORMAL
BACTERIA # UR AUTO: ABNORMAL /HPF
BACTERIA # UR AUTO: ABNORMAL /HPF
BARBITURATES UR SCN-MCNC: NEGATIVE — SIGNIFICANT CHANGE UP
BARBITURATES UR SCN-MCNC: NEGATIVE — SIGNIFICANT CHANGE UP
BENZODIAZ UR-MCNC: NEGATIVE — SIGNIFICANT CHANGE UP
BENZODIAZ UR-MCNC: NEGATIVE — SIGNIFICANT CHANGE UP
BILIRUB UR-MCNC: NEGATIVE — SIGNIFICANT CHANGE UP
BILIRUB UR-MCNC: NEGATIVE — SIGNIFICANT CHANGE UP
COCAINE METAB.OTHER UR-MCNC: NEGATIVE — SIGNIFICANT CHANGE UP
COCAINE METAB.OTHER UR-MCNC: NEGATIVE — SIGNIFICANT CHANGE UP
COLOR SPEC: YELLOW — SIGNIFICANT CHANGE UP
COLOR SPEC: YELLOW — SIGNIFICANT CHANGE UP
DIFF PNL FLD: ABNORMAL
DIFF PNL FLD: ABNORMAL
EPI CELLS # UR: 1 — SIGNIFICANT CHANGE UP
EPI CELLS # UR: 1 — SIGNIFICANT CHANGE UP
FOLATE SERPL-MCNC: >20 NG/ML — SIGNIFICANT CHANGE UP
FOLATE SERPL-MCNC: >20 NG/ML — SIGNIFICANT CHANGE UP
GLUCOSE UR QL: NEGATIVE MG/DL — SIGNIFICANT CHANGE UP
GLUCOSE UR QL: NEGATIVE MG/DL — SIGNIFICANT CHANGE UP
KETONES UR-MCNC: 80 MG/DL
KETONES UR-MCNC: 80 MG/DL
LEUKOCYTE ESTERASE UR-ACNC: ABNORMAL
LEUKOCYTE ESTERASE UR-ACNC: ABNORMAL
MAGNESIUM SERPL-MCNC: 1.8 MG/DL — SIGNIFICANT CHANGE UP (ref 1.6–2.6)
MAGNESIUM SERPL-MCNC: 1.8 MG/DL — SIGNIFICANT CHANGE UP (ref 1.6–2.6)
METHADONE UR-MCNC: NEGATIVE — SIGNIFICANT CHANGE UP
METHADONE UR-MCNC: NEGATIVE — SIGNIFICANT CHANGE UP
NITRITE UR-MCNC: NEGATIVE — SIGNIFICANT CHANGE UP
NITRITE UR-MCNC: NEGATIVE — SIGNIFICANT CHANGE UP
OPIATES UR-MCNC: NEGATIVE — SIGNIFICANT CHANGE UP
OPIATES UR-MCNC: NEGATIVE — SIGNIFICANT CHANGE UP
PCP SPEC-MCNC: SIGNIFICANT CHANGE UP
PCP SPEC-MCNC: SIGNIFICANT CHANGE UP
PCP UR-MCNC: NEGATIVE — SIGNIFICANT CHANGE UP
PCP UR-MCNC: NEGATIVE — SIGNIFICANT CHANGE UP
PH UR: 5.5 — SIGNIFICANT CHANGE UP (ref 5–8)
PH UR: 5.5 — SIGNIFICANT CHANGE UP (ref 5–8)
PHOSPHATE SERPL-MCNC: 3.4 MG/DL — SIGNIFICANT CHANGE UP (ref 2.5–4.5)
PHOSPHATE SERPL-MCNC: 3.4 MG/DL — SIGNIFICANT CHANGE UP (ref 2.5–4.5)
PROT UR-MCNC: 30 MG/DL
PROT UR-MCNC: 30 MG/DL
RBC CASTS # UR COMP ASSIST: >50 /HPF — HIGH (ref 0–4)
RBC CASTS # UR COMP ASSIST: >50 /HPF — HIGH (ref 0–4)
SP GR SPEC: 1.02 — SIGNIFICANT CHANGE UP (ref 1–1.03)
SP GR SPEC: 1.02 — SIGNIFICANT CHANGE UP (ref 1–1.03)
THC UR QL: NEGATIVE — SIGNIFICANT CHANGE UP
THC UR QL: NEGATIVE — SIGNIFICANT CHANGE UP
TSH SERPL-MCNC: 2.54 UIU/ML — SIGNIFICANT CHANGE UP (ref 0.36–3.74)
TSH SERPL-MCNC: 2.54 UIU/ML — SIGNIFICANT CHANGE UP (ref 0.36–3.74)
UROBILINOGEN FLD QL: 1 MG/DL — SIGNIFICANT CHANGE UP (ref 0.2–1)
UROBILINOGEN FLD QL: 1 MG/DL — SIGNIFICANT CHANGE UP (ref 0.2–1)
VIT B12 SERPL-MCNC: 1903 PG/ML — HIGH (ref 232–1245)
VIT B12 SERPL-MCNC: 1903 PG/ML — HIGH (ref 232–1245)
WBC UR QL: 6 /HPF — HIGH (ref 0–5)
WBC UR QL: 6 /HPF — HIGH (ref 0–5)

## 2023-12-24 PROCEDURE — 99232 SBSQ HOSP IP/OBS MODERATE 35: CPT

## 2023-12-24 RX ADMIN — Medication 1 PATCH: at 20:20

## 2023-12-24 RX ADMIN — Medication 1 MILLIGRAM(S): at 11:37

## 2023-12-24 RX ADMIN — SODIUM CHLORIDE 75 MILLILITER(S): 9 INJECTION, SOLUTION INTRAVENOUS at 00:05

## 2023-12-24 RX ADMIN — Medication 100 MILLIGRAM(S): at 00:39

## 2023-12-24 RX ADMIN — Medication 81 MILLIGRAM(S): at 11:38

## 2023-12-24 RX ADMIN — Medication 1 PATCH: at 11:37

## 2023-12-24 NOTE — PROGRESS NOTE ADULT - SUBJECTIVE AND OBJECTIVE BOX
Medicine Progress Note    Patient is a 88y old  Female who presents with a chief complaint of Failure to thrive in adult     (24 Dec 2023 10:23)      SUBJECTIVE / OVERNIGHT EVENTS:  seen and examined  Chart reviewed  No overnight events  BM+  No pain  No complaints    ADDITIONAL REVIEW OF SYSTEMS:  denied fever/chills/CP/SOB/cough/palpitation/dizziness/abdominal pian/nausea/vomiting/diarrhoea/constipation/dysuria/leg or calf pain/headaches.all other ROS neg    MEDICATIONS  (STANDING):  aspirin  chewable 81 milliGRAM(s) Oral daily  folic acid 1 milliGRAM(s) Oral daily  nicotine -   7 mG/24Hr(s) Patch 1 Patch Transdermal daily    MEDICATIONS  (PRN):  acetaminophen     Tablet .. 650 milliGRAM(s) Oral every 6 hours PRN Temp greater or equal to 38C (100.4F), Mild Pain (1 - 3)  aluminum hydroxide/magnesium hydroxide/simethicone Suspension 30 milliLiter(s) Oral every 4 hours PRN Dyspepsia  LORazepam     Tablet 2 milliGRAM(s) Oral every 2 hours PRN Symptom-triggered: 2 point increase in CIWA -Ar score and a total score of 7 or LESS  melatonin 3 milliGRAM(s) Oral at bedtime PRN Insomnia  nicotine  Polacrilex Lozenge 2 milliGRAM(s) Oral every 2 hours PRN Breakthrough cravings  ondansetron Injectable 4 milliGRAM(s) IV Push every 8 hours PRN Nausea and/or Vomiting  QUEtiapine 12.5 milliGRAM(s) Oral at bedtime PRN agitation    CAPILLARY BLOOD GLUCOSE        I&O's Summary    23 Dec 2023 07:01  -  24 Dec 2023 07:00  --------------------------------------------------------  IN: 600 mL / OUT: 0 mL / NET: 600 mL    24 Dec 2023 07:01  -  24 Dec 2023 13:59  --------------------------------------------------------  IN: 1225 mL / OUT: 500 mL / NET: 725 mL        PHYSICAL EXAM:  Vital Signs Last 24 Hrs  T(C): 36.5 (24 Dec 2023 05:00), Max: 36.7 (23 Dec 2023 23:24)  T(F): 97.7 (24 Dec 2023 05:00), Max: 98 (23 Dec 2023 23:24)  HR: 87 (24 Dec 2023 09:04) (73 - 92)  BP: 112/64 (24 Dec 2023 09:04) (102/67 - 119/82)  BP(mean): --  RR: 18 (24 Dec 2023 05:00) (17 - 19)  SpO2: 97% (24 Dec 2023 09:04) (97% - 98%)    Parameters below as of 24 Dec 2023 09:04  Patient On (Oxygen Delivery Method): room air    GENERAL: Not in distress. Alert    HEENT: clear conjuctiva, MMM. no pallor or icterus  CARDIOVASCULAR: RRR S1, S2. No murmur/rubs/gallop  LUNGS: BLAE+, no rales, no wheezing, no rhonchi.    ABDOMEN: ND. Soft,  NT, no guarding / rebound / rigidity. BS normoactive  BACK: No spine tenderness.  EXTREMITIES: no edema. no leg or calf TP.  SKIN: warm and dry  PSYCHIATRIC: Calm.  No agitation.  CNS: AAO*2. moves limbs, follows commands    LABS:                        13.9   7.21  )-----------( 328      ( 23 Dec 2023 15:49 )             41.1     12-23    136  |  100  |  16  ----------------------------<  63<L>  3.9   |  22  |  0.72    Ca    9.6      23 Dec 2023 15:49  Phos  3.4     12-24  Mg     1.8     12-24    TPro  6.8  /  Alb  3.4  /  TBili  0.8  /  DBili  x   /  AST  16  /  ALT  16  /  AlkPhos  75  12-23    PT/INR - ( 23 Dec 2023 15:49 )   PT: 11.6 sec;   INR: 0.99 ratio         PTT - ( 23 Dec 2023 15:49 )  PTT:29.8 sec      Urinalysis Basic - ( 24 Dec 2023 13:40 )    Color: Yellow / Appearance: Cloudy / S.020 / pH: x  Gluc: x / Ketone: 80 mg/dL  / Bili: Negative / Urobili: 1.0 mg/dL   Blood: x / Protein: 30 mg/dL / Nitrite: Negative   Leuk Esterase: Small / RBC: >50 /HPF / WBC 6 /HPF   Sq Epi: x / Non Sq Epi: x / Bacteria: Few /HPF        SARS-CoV-2: NotDetec (23 Dec 2023 15:45)      RADIOLOGY & ADDITIONAL TESTS:  Imaging from Last 24 Hours:    Electrocardiogram/QTc Interval:    COORDINATION OF CARE:  Care Discussed with Consultants/Other Providers:

## 2023-12-24 NOTE — OCCUPATIONAL THERAPY INITIAL EVALUATION ADULT - NSOTDISCHREC_GEN_A_CORE
Pt recommended D/C home with 24/7 assistance/supervision to ensure safety completing all ADLS/IADLS and functional transfers 2/2 cognitive deficits./Home OT

## 2023-12-24 NOTE — OCCUPATIONAL THERAPY INITIAL EVALUATION ADULT - ADDITIONAL COMMENTS
Pt with difficulty recalling home set-up during initial OT evaluation secondary to cognitive deficits, OT continue to follow up with SW to confirm. However per pt, pt lives in a  with daughter 1STE, 1 flight of stairs once inside, full bathroom access on first floor +walk in shower/glass door. Per pt, pt was independent with ADLs and functional transfers prior to admission, no use of device. Pt reports she required assistance for IADLS from daughter, who provides assistance/supervision most daytime/nighttime hours. As per chart, pts daughter currently in hospital.

## 2023-12-24 NOTE — OCCUPATIONAL THERAPY INITIAL EVALUATION ADULT - SITTING BALANCE: DYNAMIC
Pharmacy Medication Reconciliation Note     List of medications patient is currently taking is complete. Allergies:  Elena advanced aspirin ex st [aspirin]    Source of information:   1. Dispensing record  2. EMR  3. Son who manages medications    Notes regarding home medications:   1. Patient did receive her AM meds today  2. No longer takes many supplements  3. Reports she is supposed to be on sotalol but there was an issue with getting a new prescription after she left the hospital, so she has not been on it for several weeks. Unclear exactly if she is supposed to be on this from last OV with cardiologist.   4. Receives trazodone and effexor from PCP - son positive she takes these both.      Denies taking any other OTC or herbal medications    Hector Soto PharmD, BCPS  5/16/2019  5:27 PM good minus

## 2023-12-24 NOTE — DIETITIAN NUTRITION RISK NOTIFICATION - TREATMENT: THE FOLLOWING DIET HAS BEEN RECOMMENDED
Lucretia Bean is a 61 y.o. female here for a diabetic eye screening with non-dilated fundus photos per DR. POP.    Patient cooperative?: Yes  Small pupils?: Yes  Last eye exam: UNKNOWN    For exam results, see Encounter Report.       Diet, Regular (12-23-23 @ 22:06) [Active]

## 2023-12-24 NOTE — PROGRESS NOTE ADULT - ASSESSMENT
87 y/o F with PMH of asthma and dementia  was brought in by EMS from home, patient was found locked out of her house.  Patient being admitted for social issues and placement.     #Dementia   -per conversation with daughter Marimar she wants to take her Mother home, she cares for her however she is admitted to hospital now  -Reorient as needed, patient unable to make decisions on her own   -pt does not appear to have an acute infection, she is afebrile, no wbc  -F/U UA, urine culture however she is asymptomatic  -PT eval pending  - OT--> Home OT  - fall precaution advised    #Blepharitis left eye  - erythromycin ointment. dc bacitricin    #Asthma/hx COPD  -Continue with Symbicort daily and albuterol prn  -pt refusing CXR    #DVT prophylaxis  -Lovenox       GOC;  FULL CODE    Dispo:  per Daughter Marimar she does not want pt to go to [facility], and she will take her home . however she is admitted to hospital now. patient needs 24/7 supervision and assisst 87 y/o F with PMH of asthma and dementia  was brought in by EMS from home, patient was found locked out of her house.  Patient being admitted for social issues and placement.     #Dementia   -per conversation with daughter Marimar she wants to take her Mother home, she cares for her however she is admitted to hospital now  -Reorient as needed, patient unable to make decisions on her own   -pt does not appear to have an acute infection, she is afebrile, no wbc  -F/U UA, urine culture however she is asymptomatic  -PT eval pending  - OT--> Home OT  - fall precaution advised    #Blepharitis left eye  - erythromycin ointment. dc bacitricin    #Asthma/hx COPD  -Continue with Symbicort daily and albuterol prn  -pt refusing CXR    #DVT prophylaxis  -Lovenox       GOC;  FULL CODE    Dispo:  per Daughter Marimar she does not want pt to go to [facility], and she will take her home . however she is admitted to hospital now. patient needs 24/7 supervision and assisst 89 y/o F with PMH of asthma and dementia  was brought in by EMS from home, patient was found locked out of her house.  Patient being admitted for social issues and placement.     Unsteady gait, chronic  - PT eval pending  - OT--> Home OT  - fall precaution advised    #Dementia   -per conversation with daughter Marimar she wants to take her Mother home, she cares for her however she is admitted to hospital now  -Reorient as needed, patient unable to make decisions on her own   -pt does not appear to have an acute infection, she is afebrile, no wbc  -F/U UA, urine culture however she is asymptomatic  - not on meds  - check TSH, B12, FA, vit D    #Blepharitis left eye  - erythromycin ointment. dc bacitracin    #Asthma/hx COPD  -Continue with Symbicort daily and albuterol prn  -pt refusing CXR    #DVT prophylaxis  -Lovenox       GOC;  FULL CODE    Dispo:  per Daughter Marimar she does not want pt to go to [facility], and she will take her home . however she is admitted to hospital now. patient needs 24/7 supervision and assisst 89 y/o F with PMH of asthma and dementia  was brought in by EMS from home, patient was found locked out of her house.  Patient being admitted for social issues and placement.     Unsteady gait, chronic  - PT eval pending  - OT--> Home OT  - fall precaution advised    #Dementia   -per conversation with daughter Marimar she wants to take her Mother home, she cares for her however she is admitted to hospital now  -Reorient as needed, patient unable to make decisions on her own   -pt does not appear to have an acute infection, she is afebrile, no wbc  -F/U UA, urine culture however she is asymptomatic  - not on meds  - check TSH, B12, FA, vit D    #Blepharitis left eye  - erythromycin ointment. dc bacitracin    #Asthma/hx COPD  -Continue with Symbicort daily and albuterol prn  -pt refusing CXR    #DVT prophylaxis  -Lovenox       GOC;  FULL CODE    Dispo:  per Daughter Marimar she does not want pt to go to [facility], and she will take her home . however she is admitted to hospital now. patient needs 24/7 supervision and assisst

## 2023-12-24 NOTE — DIETITIAN INITIAL EVALUATION ADULT - PERTINENT LABORATORY DATA
12-23    136  |  100  |  16  ----------------------------<  63<L>  3.9   |  22  |  0.72    Ca    9.6      23 Dec 2023 15:49  Phos  3.4     12-24  Mg     1.8     12-24    TPro  6.8  /  Alb  3.4  /  TBili  0.8  /  DBili  x   /  AST  16  /  ALT  16  /  AlkPhos  75  12-23

## 2023-12-24 NOTE — OCCUPATIONAL THERAPY INITIAL EVALUATION ADULT - PERTINENT HX OF CURRENT PROBLEM, REHAB EVAL
89 yo F with history of COPD, dementia and nicotine dependence presents with daughter for weakness. Complaint present for 1 week with some cough, but denied fever, headache, chest pain, abdominal pain, urinary complaints or changes in bowel movement. Patient was set to be discharged earlier, but given daughter's admission and inability to contact another family member for , safest decision was to admit patient. Per patient, she currently denies any complaints and states she feels well. Daughter, Marimar Alfredo interviewed to obtain more information whom states patient is daily alcohol consumer (patient disclosed occassional alcohol consumption) and takes only the medications listed. Outpatient records reviewed but provided no additional information. Unable to contact pharmacy to inquire for further medications.

## 2023-12-24 NOTE — BH CONSULTATION LIAISON ASSESSMENT NOTE - HPI (INCLUDE ILLNESS QUALITY, SEVERITY, DURATION, TIMING, CONTEXT, MODIFYING FACTORS, ASSOCIATED SIGNS AND SYMPTOMS)
88 year old female with h/o dementia, seasonal allergies comes to ED with dtr for c/o generalized weakness, FTT, labs reviewed- acceptable, patient has unstable gait, almost fell in the Ed. pt lives with dtr who is now admitted and pt is unable to be d/c home due to dementia and unsteady gait. unable to take care of self    history obtained by dtr (Marimar, who is also admitted today)- who lives with pt, reports that pt  is a smoker and drinks vodka and hides it in the house,   patients cognitive function has declined a lot over the last  2 yrs.  dtr also reports hallucinations and at times pt thinks that her dtr is her sister or her son. pt refuses to take meds,   she does take asa 81 daily no clear indication .Psychiatry asked to evaluate for capacity.    88 year old female with h/o dementia, seasonal allergies comes to ED with dtr for c/o generalized weakness, FTT, labs reviewed- acceptable, patient has unstable gait, almost fell in the Ed. pt lives with dtr who is now admitted and pt is unable to be d/c home due to dementia and unsteady gait. unable to take care of self    history obtained by dtr (Marimar, who is also admitted today)- who lives with pt, reports that pt  is a smoker and drinks vodka and hides it in the house,   patients cognitive function has declined a lot over the last  2 yrs.  dtr also reports hallucinations and at times pt thinks that her dtr is her sister or her son. pt refuses to take meds,   she does take asa 81 daily no clear indication .Psychiatry asked to evaluate for capacity.  As per daughter patient was diagnosed earlier  by Dr Kurzweil  with Bipolar disorder, Alcohol abuse and dementia., patient refused to take any meds.

## 2023-12-24 NOTE — OCCUPATIONAL THERAPY INITIAL EVALUATION ADULT - GENERAL OBSERVATIONS, REHAB EVAL
MD orders received. Chart reviewed, conferred with CHARLIE Bridges. Pt received supine in bed, alert and orientedX2, +PIV, NAD, VSS + mildly confused requiring verbal cues for reorientation/redirection. Pt tolerated OT initial evaluation, treatment plan and goals discussed and agreed upon. Pt left supine in bed, +bed alarm activated, NAD, VSS, all lines intact, and call bell in reach.

## 2023-12-24 NOTE — DIETITIAN INITIAL EVALUATION ADULT - PERTINENT MEDS FT
MEDICATIONS  (STANDING):  aspirin  chewable 81 milliGRAM(s) Oral daily  folic acid 1 milliGRAM(s) Oral daily  lactated ringers. 1000 milliLiter(s) (75 mL/Hr) IV Continuous <Continuous>  nicotine -   7 mG/24Hr(s) Patch 1 Patch Transdermal daily    MEDICATIONS  (PRN):  acetaminophen     Tablet .. 650 milliGRAM(s) Oral every 6 hours PRN Temp greater or equal to 38C (100.4F), Mild Pain (1 - 3)  aluminum hydroxide/magnesium hydroxide/simethicone Suspension 30 milliLiter(s) Oral every 4 hours PRN Dyspepsia  LORazepam     Tablet 2 milliGRAM(s) Oral every 2 hours PRN Symptom-triggered: 2 point increase in CIWA -Ar score and a total score of 7 or LESS  melatonin 3 milliGRAM(s) Oral at bedtime PRN Insomnia  nicotine  Polacrilex Lozenge 2 milliGRAM(s) Oral every 2 hours PRN Breakthrough cravings  ondansetron Injectable 4 milliGRAM(s) IV Push every 8 hours PRN Nausea and/or Vomiting  QUEtiapine 12.5 milliGRAM(s) Oral at bedtime PRN agitation

## 2023-12-24 NOTE — DIETITIAN INITIAL EVALUATION ADULT - OTHER INFO
89 yo F with history of COPD, dementia and nicotine dependence presents for 1 week history of weakness. Patient admitted for evaluation due to inability to safely return home as daughter also being admitted.  Pt endorsed dislike of bfast provided this morning. Alternative bfast ordered for pt at time of visit. Pt denies any recent weight change, although expressed desire to lose weight. Current weight 139.9lbs. Weight on previous admission 160lbs (9/2023). States that she eats well at home. No indication of failure to thrive. Mild/moderate wasting observed, possibly age related sarcopenia. Pt denies N/V/D, constipation. States she moves bowels daily.

## 2023-12-24 NOTE — BH CONSULTATION LIAISON ASSESSMENT NOTE - SUMMARY
Patient is 88 y old female , with h/o Dementia, poor memory and  no insight , can not make decisions for her care and treatment.  Patient is 88 y old female , with h/o Dementia, Alcohol abuse,  poor memory and  no insight , can not make decisions for her care and treatment. She needs SW to help to resolve  situation at home , She can not live by herself and daughter can not now take care of her . As per daughter patient is not sleeping nights and sleeps during the day.

## 2023-12-24 NOTE — DIETITIAN INITIAL EVALUATION ADULT - ORAL INTAKE PTA/DIET HISTORY
Pt endorses excellent appetite PTA. States that she lives with her daughter who typically prepares bfast at home & orders in for lunch/dinner daily. NKFA. States that she takes supplements that her daughter has because she is a "health nut". No chewing/swallowing issues. Denies use of oral nutrition supplements in the past.

## 2023-12-24 NOTE — BH CONSULTATION LIAISON ASSESSMENT NOTE - NSBHCHARTREVIEWVS_PSY_A_CORE FT
Vital Signs Last 24 Hrs  T(C): 36.5 (24 Dec 2023 05:00), Max: 36.7 (23 Dec 2023 23:24)  T(F): 97.7 (24 Dec 2023 05:00), Max: 98 (23 Dec 2023 23:24)  HR: 87 (24 Dec 2023 09:04) (73 - 92)  BP: 112/64 (24 Dec 2023 09:04) (102/67 - 119/82)  BP(mean): --  RR: 18 (24 Dec 2023 05:00) (17 - 19)  SpO2: 97% (24 Dec 2023 09:04) (97% - 98%)    Parameters below as of 24 Dec 2023 09:04  Patient On (Oxygen Delivery Method): room air

## 2023-12-24 NOTE — PHYSICAL THERAPY INITIAL EVALUATION ADULT - PERTINENT HX OF CURRENT PROBLEM, REHAB EVAL
89 yo F with history of COPD, dementia and nicotine dependence presents with daughter for weakness. Complaint present for 1 week with some cough, but denied fever, headache, chest pain, abdominal pain, urinary complaints or changes in bowel movement. Patient was set to be discharged earlier, but given daughter's admission and inability to contact another family member for , safest decision was to admit patient. Per patient, she currently denies any complaints and states she feels well. Daughter, Marimar Alfredo interviewed to obtain more information whom states patient is daily alcohol consumer (patient disclosed occassional alcohol consumption) and takes only the medications listed. Outpatient records reviewed but provided no additional information. Unable to contact pharmacy to inquire for further medications. 87 yo F with history of COPD, dementia and nicotine dependence presents with daughter for weakness. Complaint present for 1 week with some cough, but denied fever, headache, chest pain, abdominal pain, urinary complaints or changes in bowel movement. Patient was set to be discharged earlier, but given daughter's admission and inability to contact another family member for , safest decision was to admit patient. Per patient, she currently denies any complaints and states she feels well. Daughter, Marimar Alfredo interviewed to obtain more information whom states patient is daily alcohol consumer (patient disclosed occassional alcohol consumption) and takes only the medications listed. Outpatient records reviewed but provided no additional information. Unable to contact pharmacy to inquire for further medications.

## 2023-12-24 NOTE — PHYSICAL THERAPY INITIAL EVALUATION ADULT - ADDITIONAL COMMENTS
pts dtr living w/pt in private house, 2 [address] w/rail, 1 flt to br. pt mostly independent w/ambulation, has rw and cane. dtr assists w/ADLs

## 2023-12-25 PROCEDURE — 99232 SBSQ HOSP IP/OBS MODERATE 35: CPT

## 2023-12-25 PROCEDURE — 93970 EXTREMITY STUDY: CPT | Mod: 26

## 2023-12-25 RX ADMIN — Medication 81 MILLIGRAM(S): at 12:55

## 2023-12-25 RX ADMIN — Medication 1 PATCH: at 07:00

## 2023-12-25 RX ADMIN — Medication 1 PATCH: at 11:00

## 2023-12-25 RX ADMIN — Medication 1 MILLIGRAM(S): at 12:54

## 2023-12-25 RX ADMIN — Medication 1 PATCH: at 12:52

## 2023-12-25 RX ADMIN — Medication 1 PATCH: at 19:28

## 2023-12-25 NOTE — PROGRESS NOTE ADULT - ASSESSMENT
87 y/o F with PMH of asthma and dementia  was brought in by EMS from home, patient was found locked out of her house.  Patient being admitted for social issues and placement.     Unsteady gait, chronic  - PT eval --> no skilled PT need  - OT--> Home OT  - fall precaution advised    #Dementia   -per conversation with daughter Marimar she wants to take her Mother home, she cares for her however she is admitted to hospital now  -Reorient as needed, patient unable to make decisions on her own   -pt does not appear to have an acute infection, she is afebrile, no wbc  -F/U UA, urine culture however she is asymptomatic  - not on meds  - normal TSH, B12, FA, vit D    #Blepharitis left eye  - improved    #Asthma/hx COPD  -Continue with Symbicort daily and albuterol prn  -pt refusing CXR    #DVT prophylaxis  -Lovenox       GOC;  FULL CODE    Dispo:  per Daughter Marimar she does not want pt to go to [facility], and she will take her home . however she is admitted to hospital now. patient needs 24/7 supervision and assisst. called Marimar in two numbers. did not go through. called son Edward Barreto who HCP per chart: left VM to call back. son Estevan- [phone] home, [phone] cell HCP   2nd daughter- Caitie- [phone] 89 y/o F with PMH of asthma and dementia  was brought in by EMS from home, patient was found locked out of her house.  Patient being admitted for social issues and placement.     Unsteady gait, chronic  - PT eval --> no skilled PT need  - OT--> Home OT  - fall precaution advised    #Dementia   -per conversation with daughter Marimar she wants to take her Mother home, she cares for her however she is admitted to hospital now  -Reorient as needed, patient unable to make decisions on her own   -pt does not appear to have an acute infection, she is afebrile, no wbc  -F/U UA, urine culture however she is asymptomatic  - not on meds  - normal TSH, B12, FA, vit D    #Blepharitis left eye  - improved    #Asthma/hx COPD  -Continue with Symbicort daily and albuterol prn  -pt refusing CXR    #DVT prophylaxis  -Lovenox       GOC;  FULL CODE    Dispo:  per Daughter Marimar she does not want pt to go to [facility], and she will take her home . however she is admitted to hospital now. patient needs 24/7 supervision and assisst. called Marimar in two numbers. did not go through. called son Edward Barreto who HCP per chart: left VM to call back. son Estevan- [phone] home, [phone] cell HCP   2nd daughter- Caitie- [phone] 87 y/o F with PMH of asthma and dementia  was brought in by EMS from home, patient was found locked out of her house.  Patient being admitted for social issues and placement.     b/l Leg TP  - duplex to r/o DVT  - no ssx of cellulitis  - chronic venous stasis changes    Unsteady gait, chronic  - PT eval --> no skilled PT need  - OT--> Home OT  - fall precaution advised    #Dementia   -per conversation with daughter Marimar she wants to take her Mother home, she cares for her however she is admitted to hospital now  -Reorient as needed, patient unable to make decisions on her own   -pt does not appear to have an acute infection, she is afebrile, no wbc  -F/U UA, urine culture however she is asymptomatic  - not on meds  - normal TSH, B12, FA, vit D    #Blepharitis left eye  - improved    #Asthma/hx COPD  -Continue with Symbicort daily and albuterol prn  -pt refusing CXR    #DVT prophylaxis  -Lovenox       GOC;  FULL CODE    Dispo:  per Daughter Marimar she does not want pt to go to [facility] [place], and she will take her home . however she is admitted to hospital now. patient needs 24/7 supervision and assisst. called Marimar in two numbers. did not go through. called son Sandee Barreto who HCP per chart: left VM to call back. sandee Barreto- [phone] home, [phone] cell HCP   2nd daughter- Caitie- [phone] 87 y/o F with PMH of asthma and dementia  was brought in by EMS from home, patient was found locked out of her house.  Patient being admitted for social issues and placement.     b/l Leg TP  - duplex to r/o DVT  - no ssx of cellulitis  - chronic venous stasis changes    Unsteady gait, chronic  - PT eval --> no skilled PT need  - OT--> Home OT  - fall precaution advised    #Dementia   -per conversation with daughter Marimar she wants to take her Mother home, she cares for her however she is admitted to hospital now  -Reorient as needed, patient unable to make decisions on her own   -pt does not appear to have an acute infection, she is afebrile, no wbc  -F/U UA, urine culture however she is asymptomatic  - not on meds  - normal TSH, B12, FA, vit D    #Blepharitis left eye  - improved    #Asthma/hx COPD  -Continue with Symbicort daily and albuterol prn  -pt refusing CXR    #DVT prophylaxis  -Lovenox       GOC;  FULL CODE    Dispo:  per Daughter Marimar she does not want pt to go to [facility], and she will take her home . however she is admitted to hospital now. patient needs 24/7 supervision and assisst. called Marimar in two numbers. did not go through. called son Sandee Barreto who HCP per chart: left VM to call back. sandee Barreto- [phone] home, [phone] cell HCP   2nd daughter- Caitie- [phone] 87 y/o F with PMH of asthma and dementia  was brought in by EMS from home, patient was found locked out of her house.  Patient being admitted for social issues and placement.     b/l Leg TP  - duplex to r/o DVT  - no ssx of cellulitis  - chronic venous stasis changes    Unsteady gait, chronic  - PT eval --> no skilled PT need  - OT--> Home OT  - fall precaution advised    #Dementia   -per conversation with daughter Marimar she wants to take her Mother home, she cares for her however she is admitted to hospital now  -Reorient as needed, patient unable to make decisions on her own   -pt does not appear to have an acute infection, she is afebrile, no wbc  -F/U UA, urine culture however she is asymptomatic  - not on meds  - normal TSH, B12, FA, vit D    #Blepharitis left eye  - improved    #Asthma/hx COPD  -Continue with Symbicort daily and albuterol prn  -pt refusing CXR    #DVT prophylaxis  -Lovenox       GOC;  FULL CODE    Dispo:  per Daughter Marimar she does not want pt to go to [facility], and she will take her home . however she is admitted to hospital now. patient needs 24/7 supervision and assisst. called Marimar in two numbers. did not go through. called son Edward Barreto who HCP per chart: left  to call back. edward Barreto- [phone] home, [phone] cell HCP   2nd daughter- Caitie- [phone]    addendum: spoke to edward Barreto [phone] cell. as per Estevan she is her POA and HCP. he will send HCP form tomorrow. CM to contact him tomorrow regarding DC planning. he is not in touch his sister Denise. he is planning to send his mom to [facility].   he wants us to contact Denise if possible. called the numbers provided. did not go through 89 y/o F with PMH of asthma and dementia  was brought in by EMS from home, patient was found locked out of her house.  Patient being admitted for social issues and placement.     b/l Leg TP  - duplex to r/o DVT  - no ssx of cellulitis  - chronic venous stasis changes    Unsteady gait, chronic  - PT eval --> no skilled PT need  - OT--> Home OT  - fall precaution advised    #Dementia   -per conversation with daughter Marimar she wants to take her Mother home, she cares for her however she is admitted to hospital now  -Reorient as needed, patient unable to make decisions on her own   -pt does not appear to have an acute infection, she is afebrile, no wbc  -F/U UA, urine culture however she is asymptomatic  - not on meds  - normal TSH, B12, FA, vit D    #Blepharitis left eye  - improved    #Asthma/hx COPD  -Continue with Symbicort daily and albuterol prn  -pt refusing CXR    #DVT prophylaxis  -Lovenox       GOC;  FULL CODE    Dispo:  per Daughter Marimar she does not want pt to go to [facility], and she will take her home . however she is admitted to hospital now. patient needs 24/7 supervision and assisst. called Marimar in two numbers. did not go through. called son Edward Barreto who HCP per chart: left  to call back. edward Barreto- [phone] home, [phone] cell HCP   2nd daughter- Caitie- [phone]    addendum: spoke to edward Barreto [phone] cell. as per Estevan she is her POA and HCP. he will send HCP form tomorrow. CM to contact him tomorrow regarding DC planning. he is not in touch his sister Denise. he is planning to send his mom to [facility].   he wants us to contact Denise if possible. called the numbers provided. did not go through

## 2023-12-25 NOTE — PROGRESS NOTE ADULT - SUBJECTIVE AND OBJECTIVE BOX
Medicine Progress Note    Patient is a 88y old  Female who presents with a chief complaint of Fatigue and weakness (24 Dec 2023 13:57)      SUBJECTIVE / OVERNIGHT EVENTS:  no complaints except B/l Legs TP. new.     ADDITIONAL REVIEW OF SYSTEMS  denied fever/chills/CP/SOB/cough/palpitation/dizziness/abdominal pian/nausea/vomiting/diarrhoea/constipation/dysuria/leg or calf pain/headaches.all other ROS neg    MEDICATIONS  (STANDING):  aspirin  chewable 81 milliGRAM(s) Oral daily  folic acid 1 milliGRAM(s) Oral daily  nicotine -   7 mG/24Hr(s) Patch 1 Patch Transdermal daily    MEDICATIONS  (PRN):  acetaminophen     Tablet .. 650 milliGRAM(s) Oral every 6 hours PRN Temp greater or equal to 38C (100.4F), Mild Pain (1 - 3)  aluminum hydroxide/magnesium hydroxide/simethicone Suspension 30 milliLiter(s) Oral every 4 hours PRN Dyspepsia  LORazepam     Tablet 2 milliGRAM(s) Oral every 2 hours PRN Symptom-triggered: 2 point increase in CIWA -Ar score and a total score of 7 or LESS  melatonin 3 milliGRAM(s) Oral at bedtime PRN Insomnia  nicotine  Polacrilex Lozenge 2 milliGRAM(s) Oral every 2 hours PRN Breakthrough cravings  ondansetron Injectable 4 milliGRAM(s) IV Push every 8 hours PRN Nausea and/or Vomiting  QUEtiapine 12.5 milliGRAM(s) Oral at bedtime PRN agitation    CAPILLARY BLOOD GLUCOSE        I&O's Summary    24 Dec 2023 07:01  -  25 Dec 2023 07:00  --------------------------------------------------------  IN: 1225 mL / OUT: 501 mL / NET: 724 mL        PHYSICAL EXAM:  Vital Signs Last 24 Hrs  T(C): 36.3 (25 Dec 2023 05:52), Max: 36.7 (24 Dec 2023 15:30)  T(F): 97.4 (25 Dec 2023 05:52), Max: 98 (24 Dec 2023 15:30)  HR: 76 (25 Dec 2023 05:52) (71 - 87)  BP: 102/66 (25 Dec 2023 05:52) (100/64 - 103/69)  BP(mean): --  RR: 16 (25 Dec 2023 05:52) (16 - 17)  SpO2: 96% (25 Dec 2023 05:52) (95% - 96%)    Parameters below as of 25 Dec 2023 05:52  Patient On (Oxygen Delivery Method): room air      GENERAL: Not in distress. Alert    HEENT: clear conjuctiva, MMM. no pallor or icterus  CARDIOVASCULAR: RRR S1, S2. No murmur/rubs/gallop  LUNGS: BLAE+, no rales, no wheezing, no rhonchi.    ABDOMEN: ND. Soft,  NT, no guarding / rebound / rigidity. BS normoactive  BACK: No spine tenderness.  EXTREMITIES: no edema. no leg or calf TP.  SKIN: warm and dry  PSYCHIATRIC: Calm.  No agitation.  CNS: AAO*2. moves limbs, follows commands    LABS:                        13.9   7.21  )-----------( 328      ( 23 Dec 2023 15:49 )             41.1     12-23    136  |  100  |  16  ----------------------------<  63<L>  3.9   |  22  |  0.72    Ca    9.6      23 Dec 2023 15:49  Phos  3.4     12-24  Mg     1.8     12-24    TPro  6.8  /  Alb  3.4  /  TBili  0.8  /  DBili  x   /  AST  16  /  ALT  16  /  AlkPhos  75  12-23    PT/INR - ( 23 Dec 2023 15:49 )   PT: 11.6 sec;   INR: 0.99 ratio         PTT - ( 23 Dec 2023 15:49 )  PTT:29.8 sec      Urinalysis Basic - ( 24 Dec 2023 13:40 )    Color: Yellow / Appearance: Cloudy / S.020 / pH: x  Gluc: x / Ketone: 80 mg/dL  / Bili: Negative / Urobili: 1.0 mg/dL   Blood: x / Protein: 30 mg/dL / Nitrite: Negative   Leuk Esterase: Small / RBC: >50 /HPF / WBC 6 /HPF   Sq Epi: x / Non Sq Epi: x / Bacteria: Few /HPF        SARS-CoV-2: Kahlil (23 Dec 2023 15:45)      RADIOLOGY & ADDITIONAL TESTS:  Imaging from Last 24 Hours:    Electrocardiogram/QTc Interval:    COORDINATION OF CARE:  Care Discussed with Consultants/Other Providers:

## 2023-12-26 ENCOUNTER — TRANSCRIPTION ENCOUNTER (OUTPATIENT)
Age: 88
End: 2023-12-26

## 2023-12-26 LAB
24R-OH-CALCIDIOL SERPL-MCNC: 25.6 NG/ML — LOW (ref 30–80)
24R-OH-CALCIDIOL SERPL-MCNC: 25.6 NG/ML — LOW (ref 30–80)
VIT D25+D1,25 OH+D1,25 PNL SERPL-MCNC: 51 PG/ML — SIGNIFICANT CHANGE UP (ref 19.9–79.3)
VIT D25+D1,25 OH+D1,25 PNL SERPL-MCNC: 51 PG/ML — SIGNIFICANT CHANGE UP (ref 19.9–79.3)

## 2023-12-26 PROCEDURE — 99232 SBSQ HOSP IP/OBS MODERATE 35: CPT

## 2023-12-26 RX ORDER — NICOTINE POLACRILEX 2 MG
1 GUM BUCCAL
Qty: 1 | Refills: 0
Start: 2023-12-26 | End: 2024-01-08

## 2023-12-26 RX ORDER — ALBUTEROL 90 UG/1
2 AEROSOL, METERED ORAL
Qty: 1 | Refills: 0
Start: 2023-12-26

## 2023-12-26 RX ORDER — ACETAMINOPHEN 500 MG
2 TABLET ORAL
Qty: 0 | Refills: 0 | DISCHARGE
Start: 2023-12-26

## 2023-12-26 RX ORDER — LANOLIN ALCOHOL/MO/W.PET/CERES
1 CREAM (GRAM) TOPICAL
Qty: 0 | Refills: 0 | DISCHARGE
Start: 2023-12-26

## 2023-12-26 RX ORDER — FOLIC ACID 0.8 MG
1 TABLET ORAL
Qty: 30 | Refills: 0
Start: 2023-12-26 | End: 2024-01-24

## 2023-12-26 RX ORDER — THIAMINE MONONITRATE (VIT B1) 100 MG
1 TABLET ORAL
Qty: 30 | Refills: 0
Start: 2023-12-26 | End: 2024-01-24

## 2023-12-26 RX ORDER — QUETIAPINE FUMARATE 200 MG/1
0.5 TABLET, FILM COATED ORAL
Qty: 30 | Refills: 0
Start: 2023-12-26

## 2023-12-26 RX ORDER — THIAMINE MONONITRATE (VIT B1) 100 MG
100 TABLET ORAL DAILY
Refills: 0 | Status: DISCONTINUED | OUTPATIENT
Start: 2023-12-26 | End: 2023-12-29

## 2023-12-26 RX ADMIN — Medication 1 PATCH: at 07:38

## 2023-12-26 RX ADMIN — Medication 1 MILLIGRAM(S): at 11:48

## 2023-12-26 RX ADMIN — Medication 100 MILLIGRAM(S): at 11:49

## 2023-12-26 RX ADMIN — Medication 1 PATCH: at 12:00

## 2023-12-26 RX ADMIN — Medication 81 MILLIGRAM(S): at 11:48

## 2023-12-26 RX ADMIN — Medication 1 TABLET(S): at 11:48

## 2023-12-26 RX ADMIN — QUETIAPINE FUMARATE 12.5 MILLIGRAM(S): 200 TABLET, FILM COATED ORAL at 01:58

## 2023-12-26 RX ADMIN — Medication 3 MILLIGRAM(S): at 01:57

## 2023-12-26 RX ADMIN — Medication 1 PATCH: at 11:45

## 2023-12-26 RX ADMIN — Medication 1 PATCH: at 19:27

## 2023-12-26 NOTE — DISCHARGE NOTE PROVIDER - CARE PROVIDERS DIRECT ADDRESSES
[email or web address]

## 2023-12-26 NOTE — DISCHARGE NOTE PROVIDER - CARE PROVIDER_API CALL
Nadia Austin  Family Medicine  [address] [place] [address], [address]  [place] [address]  Phone: [phone]  Fax: [phone]  Follow Up Time: 1-3 days   Nadia Austin  Family Medicine  [address] [place] [address], [address]  [place] [address]  Phone: [phone]  Fax: [phone]  Follow Up Time: 1-3 days

## 2023-12-26 NOTE — DISCHARGE NOTE PROVIDER - HOSPITAL COURSE
89 y/o F with PMH of asthma and dementia  was brought in by EMS from home, patient was found locked out of her house.  Patient being admitted for social issues and placement.     b/l Leg TP  - duplex to r/o DVT  - no ssx of cellulitis  - chronic venous stasis changes    Unsteady gait, chronic  - PT eval --> no skilled PT need  - OT--> Home OT  - fall precaution advised    #Dementia   -per conversation with daughter Marimar she wants to take her Mother home, she cares for her however she is admitted to hospital now  -Reorient as needed, patient unable to make decisions on her own   -pt does not appear to have an acute infection, she is afebrile, no wbc  -F/U UA, urine culture however she is asymptomatic  - not on meds  - normal TSH, B12, FA, vit D    #Blepharitis left eye  - improved    #Asthma/hx COPD  -Continue with Symbicort daily and albuterol prn  -pt refusing CXR    #DVT prophylaxis  -Lovenox       GOC;  FULL CODE    Discharging Provider: Jessie Boateng MD. cell: [phone]. call with   PCP: Dr. Nadia Austin   87 y/o F with PMH of asthma and dementia  was brought in by EMS from home, patient was found locked out of her house.  Patient being admitted for social issues and placement.     b/l Leg TP  - duplex to r/o DVT  - no ssx of cellulitis  - chronic venous stasis changes    Unsteady gait, chronic  - PT eval --> no skilled PT need  - OT--> Home OT  - fall precaution advised    #Dementia   -per conversation with daughter Marimar she wants to take her Mother home, she cares for her however she is admitted to hospital now  -Reorient as needed, patient unable to make decisions on her own   -pt does not appear to have an acute infection, she is afebrile, no wbc  -F/U UA, urine culture however she is asymptomatic  - not on meds  - normal TSH, B12, FA, vit D    #Blepharitis left eye  - improved    #Asthma/hx COPD  -Continue with Symbicort daily and albuterol prn  -pt refusing CXR    #DVT prophylaxis  -Lovenox       GOC;  FULL CODE    Discharging Provider: Jessie Boateng MD. cell: [phone]. call with   PCP: Dr. Nadia Austin   89 y/o F with PMH of asthma and dementia  was brought in by EMS from home, patient was found locked out of her house.  Patient being admitted for social issues and placement.     b/l Leg TP  - duplex to r/o DVT  - no ssx of cellulitis  - chronic venous stasis changes    Unsteady gait, chronic  - PT eval --> no skilled PT need  - OT--> Home OT  - fall precaution advised    #Dementia   -per conversation with daughter Marimar she wants to take her Mother home, she cares for her however she is admitted to hospital now  -Reorient as needed, patient unable to make decisions on her own   -pt does not appear to have an acute infection, she is afebrile, no wbc  -F/U UA, urine culture however she is asymptomatic  - not on meds  - normal TSH, B12, FA, vit D    #Blepharitis left eye  - improved    #Asthma/hx COPD  -Continue with Symbicort daily and albuterol prn  -pt refusing CXR    #DVT prophylaxis  -Lovenox       GOC;  FULL CODE    Discharging Provider: Jessie Boateng MD. cell: [phone]. call with   PCP: Dr. Nadia Austin           87 y/o F with PMH of asthma and dementia  was brought in by EMS from home, patient was found locked out of her house.  Patient being admitted for social issues and placement.     b/l Leg TP  - duplex to r/o DVT  - no ssx of cellulitis  - chronic venous stasis changes    Unsteady gait, chronic  - PT eval --> no skilled PT need  - OT--> Home OT  - fall precaution advised    #Dementia   -per conversation with daughter Marimar she wants to take her Mother home, she cares for her however she is admitted to hospital now  -Reorient as needed, patient unable to make decisions on her own   -pt does not appear to have an acute infection, she is afebrile, no wbc  -F/U UA, urine culture however she is asymptomatic  - not on meds  - normal TSH, B12, FA, vit D    #Blepharitis left eye  - improved    #Asthma/hx COPD  -Continue with Symbicort daily and albuterol prn  -pt refusing CXR    #DVT prophylaxis  -Lovenox       GOC;  FULL CODE    Discharging Provider: eJssie Boateng MD. cell: [phone]. call with   PCP: Dr. Nadia Austin

## 2023-12-26 NOTE — DISCHARGE NOTE PROVIDER - NSDCCPCAREPLAN_GEN_ALL_CORE_FT
PRINCIPAL DISCHARGE DIAGNOSIS  Diagnosis: FTT (failure to thrive) in adult  Assessment and Plan of Treatment: please eat and drink more.      SECONDARY DISCHARGE DIAGNOSES  Diagnosis: Dementia  Assessment and Plan of Treatment: continue seroquel at bedtime as needed for agitation, insomnia. it can make you drowsy. fall precautions advised. needs 24/7 assisst and supervision. seen by physical therapist. suggested no skilled therapy need. walk with walker with supervision     PRINCIPAL DISCHARGE DIAGNOSIS  Diagnosis: FTT (failure to thrive) in adult  Assessment and Plan of Treatment: please eat and drink more.      SECONDARY DISCHARGE DIAGNOSES  Diagnosis: Dementia  Assessment and Plan of Treatment: continue seroquel at bedtime as needed for agitation, insomnia. it can make you drowsy. fall precautions advised. needs 24/7 assisst and supervision. seen by physical therapist. suggested no skilled therapy need. walk with walker with supervision    Diagnosis: Cigarette smoker within last 12 months  Assessment and Plan of Treatment: please stop  smoking. call smoking cessation hotline for help if needed. ask your family/friend to stop smoking with you. it will keep you motivated. use nictotin patch as needed for craving. i patch daily. see primary MD.    Diagnosis: Alcohol use  Assessment and Plan of Treatment: limit alcoholic drink less than 7 drink in one week. do not use more than 1 drinks at a time. take vitamin supplements. come to ER if any withdrawal symptoms.

## 2023-12-26 NOTE — DISCHARGE NOTE PROVIDER - NSDCMRMEDTOKEN_GEN_ALL_CORE_FT
acetaminophen 325 mg oral tablet: 2 tab(s) orally every 6 hours As needed Temp greater or equal to 38C (100.4F), Mild Pain (1 - 3)  Albuterol (Eqv-ProAir HFA) 90 mcg/inh inhalation aerosol: 2 puff(s) inhaled every 6 hours as needed for  shortness of breath and/or wheezing  aspirin 81 mg oral tablet: 1 orally once a day  Breo Ellipta 100 mcg-25 mcg/inh inhalation powder: 1 puff(s) inhaled once a day  folic acid 1 mg oral tablet: 1 tab(s) orally once a day  melatonin 3 mg oral tablet: 1 tab(s) orally once a day (at bedtime) As needed Insomnia  Multiple Vitamins oral tablet: 1 tab(s) orally once a day  nicotine 7 mg/24 hr transdermal film, extended release: 1 patch transdermally once a day as needed for craving for cigarrettes  Seroquel 25 mg oral tablet: 0.5 tab(s) orally once a day (at bedtime) as needed for  agitation, insomnia  thiamine 100 mg oral tablet: 1 tab(s) orally once a day   acetaminophen 325 mg oral tablet: 2 tab(s) orally every 6 hours As needed Temp greater or equal to 38C (100.4F), Mild Pain (1 - 3)  Albuterol (Eqv-ProAir HFA) 90 mcg/inh inhalation aerosol: 2 puff(s) inhaled every 6 hours as needed for  shortness of breath and/or wheezing  aspirin 81 mg oral tablet: 1 orally once a day  aspirin 81 mg oral tablet, chewable: 1 tab(s) orally once a day  Breo Ellipta 100 mcg-25 mcg/inh inhalation powder: 1 puff(s) inhaled once a day  cholecalciferol oral tablet: 1 tab(s) orally once a day 2000 unit(s) orally once a day  folic acid 1 mg oral tablet: 1 tab(s) orally once a day  folic acid 1 mg oral tablet: 1 tab(s) orally once a day  melatonin 3 mg oral tablet: 1 tab(s) orally once a day (at bedtime) As needed Insomnia  Multiple Vitamins oral tablet: 1 tab(s) orally once a day  Multiple Vitamins oral tablet: 1 tab(s) orally once a day  nicotine 21 mg/24 hr transdermal film, extended release: 1 patch transdermally once a day  nicotine 7 mg/24 hr transdermal film, extended release: 1 patch transdermally once a day as needed for craving for cigarrettes  Seroquel 25 mg oral tablet: 0.5 tab(s) orally once a day  Seroquel 25 mg oral tablet: 0.5 tab(s) orally once a day (at bedtime) as needed for  agitation, insomnia  thiamine 100 mg oral tablet: 1 tab(s) orally once a day  thiamine 100 mg oral tablet: 1 tab(s) orally once a day

## 2023-12-26 NOTE — PROGRESS NOTE ADULT - SUBJECTIVE AND OBJECTIVE BOX
Medicine Progress Note    Patient is a 88y old  Female who presents with a chief complaint of Fatigue and weakness (26 Dec 2023 07:08)      SUBJECTIVE / OVERNIGHT EVENTS:  seen and examined  Chart reviewed  No overnight events  Limb weakness improving with therapy  BM+  No pain  No complaints    ADDITIONAL REVIEW OF SYSTEMS:  denied fever/chills/CP/SOB/cough/palpitation/dizziness/abdominal pian/nausea/vomiting/diarrhoea/constipation/dysuria/leg or calf pain/headaches.all other ROS neg    MEDICATIONS  (STANDING):  aspirin  chewable 81 milliGRAM(s) Oral daily  folic acid 1 milliGRAM(s) Oral daily  multivitamin 1 Tablet(s) Oral daily  nicotine -   7 mG/24Hr(s) Patch 1 Patch Transdermal daily  thiamine 100 milliGRAM(s) Oral daily    MEDICATIONS  (PRN):  acetaminophen     Tablet .. 650 milliGRAM(s) Oral every 6 hours PRN Temp greater or equal to 38C (100.4F), Mild Pain (1 - 3)  aluminum hydroxide/magnesium hydroxide/simethicone Suspension 30 milliLiter(s) Oral every 4 hours PRN Dyspepsia  LORazepam     Tablet 2 milliGRAM(s) Oral every 2 hours PRN Symptom-triggered: 2 point increase in CIWA -Ar score and a total score of 7 or LESS  melatonin 3 milliGRAM(s) Oral at bedtime PRN Insomnia  nicotine  Polacrilex Lozenge 2 milliGRAM(s) Oral every 2 hours PRN Breakthrough cravings  ondansetron Injectable 4 milliGRAM(s) IV Push every 8 hours PRN Nausea and/or Vomiting  QUEtiapine 12.5 milliGRAM(s) Oral at bedtime PRN agitation    CAPILLARY BLOOD GLUCOSE        I&O's Summary    26 Dec 2023 07:01  -  26 Dec 2023 11:19  --------------------------------------------------------  IN: 300 mL / OUT: 0 mL / NET: 300 mL        PHYSICAL EXAM:  Vital Signs Last 24 Hrs  T(C): 36.3 (26 Dec 2023 05:20), Max: 36.7 (25 Dec 2023 20:25)  T(F): 97.4 (26 Dec 2023 05:20), Max: 98.1 (25 Dec 2023 20:25)  HR: 97 (26 Dec 2023 05:20) (82 - 97)  BP: 109/75 (26 Dec 2023 05:20) (104/66 - 109/75)  BP(mean): --  RR: 17 (26 Dec 2023 05:20) (17 - 18)  SpO2: 94% (26 Dec 2023 05:20) (94% - 98%)    Parameters below as of 26 Dec 2023 05:20  Patient On (Oxygen Delivery Method): room air    GENERAL: Not in distress. Alert    HEENT: clear conjuctiva, MMM. no pallor or icterus  CARDIOVASCULAR: RRR S1, S2. No murmur/rubs/gallop  LUNGS: BLAE+, no rales, no wheezing, no rhonchi.    ABDOMEN: ND. Soft,  NT, no guarding / rebound / rigidity. BS normoactive  BACK: No spine tenderness.  EXTREMITIES: no edema. no leg or calf TP.  SKIN: warm and dry  PSYCHIATRIC: Calm.  No agitation.  CNS: AAO*2. at time confused with sundowning. moves limbs, follows commands    LABS:                Urinalysis Basic - ( 24 Dec 2023 13:40 )    Color: Yellow / Appearance: Cloudy / S.020 / pH: x  Gluc: x / Ketone: 80 mg/dL  / Bili: Negative / Urobili: 1.0 mg/dL   Blood: x / Protein: 30 mg/dL / Nitrite: Negative   Leuk Esterase: Small / RBC: >50 /HPF / WBC 6 /HPF   Sq Epi: x / Non Sq Epi: x / Bacteria: Few /HPF        SARS-CoV-2: NotDetec (23 Dec 2023 15:45)      RADIOLOGY & ADDITIONAL TESTS:  Imaging from Last 24 Hours:    Electrocardiogram/QTc Interval:    COORDINATION OF CARE:  Care Discussed with Consultants/Other Providers:

## 2023-12-26 NOTE — PROGRESS NOTE ADULT - ASSESSMENT
89 y/o F with PMH of asthma and dementia  was brought in by EMS from home, patient was found locked out of her house.  Patient being admitted for social issues and placement.     b/l Leg TP  - duplex neg DVT  - no ssx of cellulitis  - chronic venous stasis changes    Unsteady gait, chronic  - PT eval --> no skilled PT need  - OT--> Home OT  - fall precaution advised    #Dementia   -per conversation with daughter Marimar she wants to take her Mother home, she cares for her however she is admitted to hospital now  -Reorient as needed, patient unable to make decisions on her own   -pt does not appear to have an acute infection, she is afebrile, no wbc  -UA midly abnormal. f/u urine culture however she is asymptomatic  - not on meds  - normal TSH, B12, FA, vit D    #Blepharitis left eye  - improved  - was on erythromycin ointment at home    #Asthma/hx COPD  -Continue with Symbicort daily and albuterol prn  -pt refusing CXR    # Mild protein-calorie malnutrition.  - nutrition eval noted    #DVT prophylaxis  -Lovenox       GOC;  FULL CODE. Palliative cx requested    Dispo:  per Daughter Marimar she does not want pt to go to [facility], and she will take her home . however she is admitted to hospital now. patient needs 24/7 supervision and assisst. called Marimar in two numbers. did not go through. called Son Estevan who HCP per chart: and dEward Barreto- [phone] home, [phone] cell , he is the POA and HCP . updated him today 12/26. he will email me HCP form today. per Estevan, he spoke to mom, was not able to get much information from her including his sister Viviane Barreto is thinking about placing her in [facility] or LTC. dispo pending arrangement, medically cleared.     2nd daughter- Caitie- [phone]   89 y/o F with PMH of asthma and dementia  was brought in by EMS from home, patient was found locked out of her house.  Patient being admitted for social issues and placement.     b/l Leg TP  - duplex neg DVT  - no ssx of cellulitis  - chronic venous stasis changes    Unsteady gait, chronic  - PT eval --> no skilled PT need  - OT--> Home OT  - fall precaution advised    #Dementia   -per conversation with daughter Marimar she wants to take her Mother home, she cares for her however she is admitted to hospital now  -Reorient as needed, patient unable to make decisions on her own   -pt does not appear to have an acute infection, she is afebrile, no wbc  -UA midly abnormal. f/u urine culture however she is asymptomatic  - not on meds  - normal TSH, B12, FA, vit D    #Blepharitis left eye  - improved  - was on erythromycin ointment at home    #Asthma/hx COPD  -Continue with Symbicort daily and albuterol prn  -pt refusing CXR    # Mild protein-calorie malnutrition.  - nutrition eval noted    #DVT prophylaxis  -Lovenox       GOC;  FULL CODE. Palliative cx requested    Dispo:  per Daughter Marimar she does not want pt to go to [facility], and she will take her home . however she is admitted to hospital now. patient needs 24/7 supervision and assisst. called Marimar in two numbers. did not go through. called Son Estevan who HCP per chart: and Edward Barreto- [phone] home, [phone] cell , he is the POA and HCP . updated him today 12/26. he will email me HCP form today. per Estevan, he spoke to mom, was not able to get much information from her including his sister Viviane Barreto is thinking about placing her in long term or LTC. dispo pending arrangement, medically cleared.     2nd daughter- Caitie- [phone]   87 y/o F with PMH of asthma and dementia  was brought in by EMS from home, patient was found locked out of her house.  Patient being admitted for social issues and placement.     b/l Leg TP  - duplex neg DVT  - no ssx of cellulitis  - chronic venous stasis changes    Unsteady gait, chronic  - PT eval --> no skilled PT need  - OT--> Home OT  - fall precaution advised    #Dementia   -per conversation with daughter Marimar she wants to take her Mother home, she cares for her however she is admitted to hospital now  -Reorient as needed, patient unable to make decisions on her own   -pt does not appear to have an acute infection, she is afebrile, no wbc  -UA midly abnormal. f/u urine culture however she is asymptomatic  - not on meds  - normal TSH, B12, FA, vit D    #Blepharitis left eye  - improved  - was on erythromycin ointment at home    #Asthma/hx COPD  -Continue with Symbicort daily and albuterol prn  -pt refusing CXR    # Cig smoking  # Alcohol use:  counselled.   c/w NRT  CIWA  Thiamin, MVT, FA    # Mild protein-calorie malnutrition.  - nutrition eval noted    #DVT prophylaxis  -Lovenox       GOC;  FULL CODE. Palliative cx requested    Dispo:  per Daughter Marimar she does not want pt to go to [facility], and she will take her home . however she is admitted to hospital now. patient needs 24/7 supervision and assisst. called Marimar in two numbers. did not go through. called Son Estevan who HCP per chart: and Edward Barreto- [phone] home, [phone] cell , he is the POA and HCP . updated him today 12/26. he will email me HCP form today. vick Barreto, he spoke to mom, was not able to get much information from her including his sister Denise. Estevan is thinking about placing her in [facility] or LTC. dispo pending arrangement, medically cleared.     2nd daughter- Caitie- [phone]   87 y/o F with PMH of asthma and dementia  was brought in by EMS from home, patient was found locked out of her house.  Patient being admitted for social issues and placement.     b/l Leg TP  - duplex neg DVT  - no ssx of cellulitis  - chronic venous stasis changes    Unsteady gait, chronic  - PT eval --> no skilled PT need  - OT--> Home OT  - fall precaution advised    #Dementia   -per conversation with daughter Marimar she wants to take her Mother home, she cares for her however she is admitted to hospital now  -Reorient as needed, patient unable to make decisions on her own   -pt does not appear to have an acute infection, she is afebrile, no wbc  -UA midly abnormal. f/u urine culture however she is asymptomatic  - not on meds  - normal TSH, B12, FA, vit D    #Blepharitis left eye  - improved  - was on erythromycin ointment at home    #Asthma/hx COPD  -Continue with Symbicort daily and albuterol prn  -pt refusing CXR    # Cig smoking  # Alcohol use:  counselled.   c/w NRT  CIWA  Thiamin, MVT, FA    # Mild protein-calorie malnutrition.  - nutrition eval noted    #DVT prophylaxis  -Lovenox       GOC;  FULL CODE. Palliative cx requested    Dispo:  per Daughter Marimar she does not want pt to go to [facility], and she will take her home . however she is admitted to hospital now. patient needs 24/7 supervision and assisst. called Marimar in two numbers. did not go through. called Son Estevan who HCP per chart: and Edward Barreto- [phone] home, [phone] cell , he is the POA and HCP . updated him today 12/26. he will email me HCP form today. vick Barreto, he spoke to mom, was not able to get much information from her including his sister Denise. Estevan is thinking about placing her in [facility] or LTC. dispo pending arrangement, medically cleared.     2nd daughter- Caitie- [phone]

## 2023-12-27 PROCEDURE — 99223 1ST HOSP IP/OBS HIGH 75: CPT

## 2023-12-27 PROCEDURE — 99232 SBSQ HOSP IP/OBS MODERATE 35: CPT

## 2023-12-27 PROCEDURE — 99497 ADVNCD CARE PLAN 30 MIN: CPT | Mod: 25

## 2023-12-27 PROCEDURE — 99233 SBSQ HOSP IP/OBS HIGH 50: CPT

## 2023-12-27 RX ADMIN — Medication 1 PATCH: at 21:30

## 2023-12-27 RX ADMIN — Medication 3 MILLIGRAM(S): at 22:50

## 2023-12-27 RX ADMIN — Medication 1 PATCH: at 07:52

## 2023-12-27 RX ADMIN — Medication 100 MILLIGRAM(S): at 11:52

## 2023-12-27 RX ADMIN — Medication 1 TABLET(S): at 11:52

## 2023-12-27 RX ADMIN — Medication 1 PATCH: at 11:52

## 2023-12-27 RX ADMIN — Medication 1 MILLIGRAM(S): at 11:52

## 2023-12-27 RX ADMIN — Medication 1 PATCH: at 11:00

## 2023-12-27 RX ADMIN — Medication 81 MILLIGRAM(S): at 11:52

## 2023-12-27 NOTE — CONSULT NOTE ADULT - SUBJECTIVE AND OBJECTIVE BOX
HPI:  89 yo F with history of COPD, dementia and nicotine dependence presents with daughter for weakness. Complaint present for 1 week with some cough, but denied fever, headache, chest pain, abdominal pain, urinary complaints or changes in bowel movement. Patient was set to be discharged earlier, but given daughter's admission and inability to contact another family member for , safest decision was to admit patient. Per patient, she currently denies any complaints and states she feels well. Daughter, Marimar Alfredo interviewed to obtain more information whom states patient is daily alcohol consumer (patient disclosed occassional alcohol consumption) and takes only the medications listed. Outpatient records reviewed but provided no additional information. Unable to contact pharmacy to inquire for further medications. (23 Dec 2023 21:03)    Pt seen and examined at bedside this morning. Pt denied any pain/complaints.    PERTINENT PM/SXH:   Asthma with COPD    Mild cognitive impairment        FAMILY HISTORY:    Family Hx substance abuse [ ]yes [ ]no  ITEMS NOT CHECKED ARE NOT PRESENT    SOCIAL HISTORY:   Significant other/partner[ ]  Children - yes  Adventism/Spirituality: n/a  Substance hx:  none   Tobacco hx:  yes   Alcohol hx: yes  Living Situation: Home with daughter Marimar Alfredo    ADVANCE DIRECTIVES:    DNR/MOLST  - no - FULL CODE  DECISION MAKER(s):   Surrogate(s)   - Estevan  Name(s): Phone Number(s): [phone]    BASELINE (I)ADL(s) (prior to admission):  [place]: moderate    Allergies  No Known Allergies    Intolerances    MEDICATIONS  (STANDING):  aspirin  chewable 81 milliGRAM(s) Oral daily  folic acid 1 milliGRAM(s) Oral daily  multivitamin 1 Tablet(s) Oral daily  nicotine -   7 mG/24Hr(s) Patch 1 Patch Transdermal daily  thiamine 100 milliGRAM(s) Oral daily    MEDICATIONS  (PRN):  acetaminophen     Tablet .. 650 milliGRAM(s) Oral every 6 hours PRN Temp greater or equal to 38C (100.4F), Mild Pain (1 - 3)  aluminum hydroxide/magnesium hydroxide/simethicone Suspension 30 milliLiter(s) Oral every 4 hours PRN Dyspepsia  LORazepam     Tablet 2 milliGRAM(s) Oral every 2 hours PRN Symptom-triggered: 2 point increase in CIWA -Ar score and a total score of 7 or LESS  melatonin 3 milliGRAM(s) Oral at bedtime PRN Insomnia  nicotine  Polacrilex Lozenge 2 milliGRAM(s) Oral every 2 hours PRN Breakthrough cravings  ondansetron Injectable 4 milliGRAM(s) IV Push every 8 hours PRN Nausea and/or Vomiting  QUEtiapine 12.5 milliGRAM(s) Oral at bedtime PRN agitation    PRESENT SYMPTOMS:      Pain:  no  QOL impact -   Location -                    Aggravating factors -  Quality -  Radiation -  Timing-  Severity (0-10 scale):  Minimal acceptable level (0-10 scale):       Dyspnea:                           none  Anxiety:                            none  Depressed:                          none  Fatigue:                            none  Nausea:                            none  Loss of appetite:            none  Constipation:                    none      Other Symptoms:  [X ]All other review of systems negative       Chaplaincy Referral: Deferred   Palliative Performance Status Version 2:       70  %    [email or web address]    PHYSICAL EXAM:  Vital Signs Last 24 Hrs  T(C): 36.7 (27 Dec 2023 06:41), Max: 36.7 (26 Dec 2023 22:35)  T(F): 98.1 (27 Dec 2023 06:41), Max: 98.1 (26 Dec 2023 22:35)  HR: 86 (27 Dec 2023 06:41) (81 - 96)  BP: 102/62 (27 Dec 2023 06:41) (102/62 - 108/71)  BP(mean): --  RR: 18 (27 Dec 2023 06:41) (16 - 18)  SpO2: 98% (27 Dec 2023 06:41) (96% - 98%)    Parameters below as of 27 Dec 2023 06:41  Patient On (Oxygen Delivery Method): room air     I&O's Summary    26 Dec 2023 07:01  -  27 Dec 2023 07:00  --------------------------------------------------------  IN: 960 mL / OUT: 0 mL / NET: 960 mL      GENERAL: pleasantly demented female laying in bed in NAD  HEENT: NC/AT, MMM  PULMONARY: CTAB, no wheezes  CARDIOVASCULAR:  RRR, no murmur  GASTROINTESTINAL: soft, nontender, +BS  Last BM: 12/25  MUSCULOSKELETAL: moves all extremities, no edema to BLE  NEUROLOGIC: oriented x1-2, does not know year  SKIN: no rashes/ecchymosis noted    LABS: reviewed from admission      RADIOLOGY & ADDITIONAL STUDIES: reviewed from  admission    PROTEIN CALORIE MALNUTRITION PRESENT: [ ]mild    https://[email or web address]/vault/2440/web/files/[email or web address]_Clinical%20Characteristics%20to%20Document%20Malnutrition-White%20JV%20et%[email or web address].pdf    Height (cm): 165.1 (12-23-23 @ 14:36)  Weight (kg): 67.993 (12-23-23 @ 14:36), 72.575 (09-26-23 @ 12:55)  BMI (kg/m2): 24.9 (12-23-23 @ 14:36)    [ ]PPSV2 < or = to 30% [ ]significant weight loss  [ ]poor nutritional intake  [ ]anasarca[ ]Artificial Nutrition      Other REFERRALS:  [ ]Hospice  [ ]Child Life  [ ]Social Work  [ ]Case management [ ]Holistic Therapy  HPI:  89 yo F with history of COPD, dementia and nicotine dependence presents with daughter for weakness. Complaint present for 1 week with some cough, but denied fever, headache, chest pain, abdominal pain, urinary complaints or changes in bowel movement. Patient was set to be discharged earlier, but given daughter's admission and inability to contact another family member for , safest decision was to admit patient. Per patient, she currently denies any complaints and states she feels well. Daughter, Marimar Alfredo interviewed to obtain more information whom states patient is daily alcohol consumer (patient disclosed occassional alcohol consumption) and takes only the medications listed. Outpatient records reviewed but provided no additional information. Unable to contact pharmacy to inquire for further medications. (23 Dec 2023 21:03)    Pt seen and examined at bedside this morning. Pt denied any pain/complaints.    PERTINENT PM/SXH:   Asthma with COPD    Mild cognitive impairment        FAMILY HISTORY:    Family Hx substance abuse [ ]yes [ ]no  ITEMS NOT CHECKED ARE NOT PRESENT    SOCIAL HISTORY:   Significant other/partner[ ]  Children - yes  Mu-ism/Spirituality: n/a  Substance hx:  none   Tobacco hx:  yes   Alcohol hx: yes  Living Situation: Home with daughter Marimar Alfredo    ADVANCE DIRECTIVES:    DNR/MOLST  - no - FULL CODE  DECISION MAKER(s):   Surrogate(s)   - Estevan  Name(s): Phone Number(s): [phone]    BASELINE (I)ADL(s) (prior to admission):  [place]: moderate    Allergies  No Known Allergies    Intolerances    MEDICATIONS  (STANDING):  aspirin  chewable 81 milliGRAM(s) Oral daily  folic acid 1 milliGRAM(s) Oral daily  multivitamin 1 Tablet(s) Oral daily  nicotine -   7 mG/24Hr(s) Patch 1 Patch Transdermal daily  thiamine 100 milliGRAM(s) Oral daily    MEDICATIONS  (PRN):  acetaminophen     Tablet .. 650 milliGRAM(s) Oral every 6 hours PRN Temp greater or equal to 38C (100.4F), Mild Pain (1 - 3)  aluminum hydroxide/magnesium hydroxide/simethicone Suspension 30 milliLiter(s) Oral every 4 hours PRN Dyspepsia  LORazepam     Tablet 2 milliGRAM(s) Oral every 2 hours PRN Symptom-triggered: 2 point increase in CIWA -Ar score and a total score of 7 or LESS  melatonin 3 milliGRAM(s) Oral at bedtime PRN Insomnia  nicotine  Polacrilex Lozenge 2 milliGRAM(s) Oral every 2 hours PRN Breakthrough cravings  ondansetron Injectable 4 milliGRAM(s) IV Push every 8 hours PRN Nausea and/or Vomiting  QUEtiapine 12.5 milliGRAM(s) Oral at bedtime PRN agitation    PRESENT SYMPTOMS:      Pain:  no  QOL impact -   Location -                    Aggravating factors -  Quality -  Radiation -  Timing-  Severity (0-10 scale):  Minimal acceptable level (0-10 scale):       Dyspnea:                           none  Anxiety:                            none  Depressed:                          none  Fatigue:                            none  Nausea:                            none  Loss of appetite:            none  Constipation:                    none      Other Symptoms:  [X ]All other review of systems negative       Chaplaincy Referral: Deferred   Palliative Performance Status Version 2:       70  %    [email or web address]    PHYSICAL EXAM:  Vital Signs Last 24 Hrs  T(C): 36.7 (27 Dec 2023 06:41), Max: 36.7 (26 Dec 2023 22:35)  T(F): 98.1 (27 Dec 2023 06:41), Max: 98.1 (26 Dec 2023 22:35)  HR: 86 (27 Dec 2023 06:41) (81 - 96)  BP: 102/62 (27 Dec 2023 06:41) (102/62 - 108/71)  BP(mean): --  RR: 18 (27 Dec 2023 06:41) (16 - 18)  SpO2: 98% (27 Dec 2023 06:41) (96% - 98%)    Parameters below as of 27 Dec 2023 06:41  Patient On (Oxygen Delivery Method): room air     I&O's Summary    26 Dec 2023 07:01  -  27 Dec 2023 07:00  --------------------------------------------------------  IN: 960 mL / OUT: 0 mL / NET: 960 mL      GENERAL: pleasantly demented female laying in bed in NAD  HEENT: NC/AT, MMM  PULMONARY: CTAB, no wheezes  CARDIOVASCULAR:  RRR, no murmur  GASTROINTESTINAL: soft, nontender, +BS  Last BM: 12/25  MUSCULOSKELETAL: moves all extremities, no edema to BLE  NEUROLOGIC: oriented x1-2, does not know year  SKIN: no rashes/ecchymosis noted    LABS: reviewed from admission      RADIOLOGY & ADDITIONAL STUDIES: reviewed from  admission    PROTEIN CALORIE MALNUTRITION PRESENT: [ ]mild    https://www.[email or web address]/vault/2440/web/files/ONC/Table_Clinical%20Characteristics%20to%20Document%20Malnutrition-White%20JV%20et%[email or web address].pdf    Height (cm): 165.1 (12-23-23 @ 14:36)  Weight (kg): 67.993 (12-23-23 @ 14:36), 72.575 (09-26-23 @ 12:55)  BMI (kg/m2): 24.9 (12-23-23 @ 14:36)    [ ]PPSV2 < or = to 30% [ ]significant weight loss  [ ]poor nutritional intake  [ ]anasarca[ ]Artificial Nutrition      Other REFERRALS:  [ ]Hospice  [ ]Child Life  [ ]Social Work  [ ]Case management [ ]Holistic Therapy

## 2023-12-27 NOTE — PROGRESS NOTE ADULT - SUBJECTIVE AND OBJECTIVE BOX
Medicine Progress Note    Patient is a 88y old  Female who presents with a chief complaint of Fatigue and weakness (27 Dec 2023 14:55)      SUBJECTIVE / OVERNIGHT EVENTS:  seen and examined  Chart reviewed  No overnight events  BM+  No pain  No complaints    ADDITIONAL REVIEW OF SYSTEMS:  denied fever/chills/CP/SOB/cough/palpitation/dizziness/abdominal pian/nausea/vomiting/diarrhoea/constipation/dysuria/leg or calf pain/headaches.all other ROS neg    MEDICATIONS  (STANDING):  aspirin  chewable 81 milliGRAM(s) Oral daily  folic acid 1 milliGRAM(s) Oral daily  multivitamin 1 Tablet(s) Oral daily  nicotine -   7 mG/24Hr(s) Patch 1 Patch Transdermal daily  thiamine 100 milliGRAM(s) Oral daily    MEDICATIONS  (PRN):  acetaminophen     Tablet .. 650 milliGRAM(s) Oral every 6 hours PRN Temp greater or equal to 38C (100.4F), Mild Pain (1 - 3)  aluminum hydroxide/magnesium hydroxide/simethicone Suspension 30 milliLiter(s) Oral every 4 hours PRN Dyspepsia  LORazepam     Tablet 2 milliGRAM(s) Oral every 2 hours PRN Symptom-triggered: 2 point increase in CIWA -Ar score and a total score of 7 or LESS  melatonin 3 milliGRAM(s) Oral at bedtime PRN Insomnia  nicotine  Polacrilex Lozenge 2 milliGRAM(s) Oral every 2 hours PRN Breakthrough cravings  ondansetron Injectable 4 milliGRAM(s) IV Push every 8 hours PRN Nausea and/or Vomiting  QUEtiapine 12.5 milliGRAM(s) Oral at bedtime PRN agitation    CAPILLARY BLOOD GLUCOSE        I&O's Summary    26 Dec 2023 07:01  -  27 Dec 2023 07:00  --------------------------------------------------------  IN: 960 mL / OUT: 0 mL / NET: 960 mL        PHYSICAL EXAM:  Vital Signs Last 24 Hrs  T(C): 36.6 (27 Dec 2023 15:20), Max: 36.7 (26 Dec 2023 22:35)  T(F): 97.8 (27 Dec 2023 15:20), Max: 98.1 (26 Dec 2023 22:35)  HR: 82 (27 Dec 2023 15:20) (81 - 86)  BP: 124/72 (27 Dec 2023 15:20) (102/62 - 124/72)  BP(mean): --  RR: 19 (27 Dec 2023 15:20) (18 - 19)  SpO2: 95% (27 Dec 2023 15:20) (95% - 98%)    Parameters below as of 27 Dec 2023 15:20  Patient On (Oxygen Delivery Method): room air      GENERAL: Not in distress. Alert    HEENT: clear conjuctiva, MMM. no pallor or icterus  CARDIOVASCULAR: RRR S1, S2. No murmur/rubs/gallop  LUNGS: BLAE+, no rales, no wheezing, no rhonchi.    ABDOMEN: ND. Soft,  NT, no guarding / rebound / rigidity. BS normoactive  BACK: No spine tenderness.  EXTREMITIES: no edema. no leg or calf TP.  SKIN: warm and dry  PSYCHIATRIC: Calm.  No agitation.  CNS: AAO*2. at time confused with sundowning. moves limbs, follows commands    LABS:                    SARS-CoV-2: NotDetec (23 Dec 2023 15:45)      RADIOLOGY & ADDITIONAL TESTS:  Imaging from Last 24 Hours:    Electrocardiogram/QTc Interval:    COORDINATION OF CARE:  Care Discussed with Consultants/Other Providers:

## 2023-12-27 NOTE — PROGRESS NOTE ADULT - ASSESSMENT
89 y/o F with PMH of asthma and dementia  was brought in by EMS from home, patient was found locked out of her house.  Patient being admitted for social issues and placement.     b/l Leg TP  - duplex neg DVT  - no ssx of cellulitis  - chronic venous stasis changes    Unsteady gait, chronic  - PT eval --> no skilled PT need  - OT--> Home OT  - fall precaution advised    #Dementia   -per conversation with daughter Marimar she wants to take her Mother home, she cares for her however she is admitted to hospital now  -Reorient as needed, patient unable to make decisions on her own   -pt does not appear to have an acute infection, she is afebrile, no wbc  -UA midly abnormal. f/u urine culture however she is asymptomatic  - not on meds  - normal TSH, B12, FA, vit D    #Blepharitis left eye  - improved  - was on erythromycin ointment at home    #Asthma/hx COPD  -Continue with Symbicort daily and albuterol prn  -pt refusing CXR    # Cig smoking  # Alcohol use:  counselled.   c/w NRT  CIWA  Thiamin, MVT, FA    # Mild protein-calorie malnutrition.  - nutrition eval noted    #DVT prophylaxis  -Lovenox       GOC;  FULL CODE. Palliative cx noted    Dispo:  per Daughter Marimar she does not want pt to go to [facility], and she will take her home . however she is admitted to hospital now. patient needs 24/7 supervision and assisst. called Marimar in two numbers. did not go through. called Son Estevan who HCP per chart: and Edward Barreto- [phone] home, [phone] cell , he is the POA and HCP . updated him today 12/26. he will fax me HCP form today. per Estevan, he spoke to mom, was not able to get much information from her including his sister Denise. Estevan is thinking about placing her in [facility] or LTC. dispo pending arrangement, medically cleared.     2nd daughter- Caitie- [phone]   89 y/o F with PMH of asthma and dementia  was brought in by EMS from home, patient was found locked out of her house.  Patient being admitted for social issues and placement.     b/l Leg TP  - duplex neg DVT  - no ssx of cellulitis  - chronic venous stasis changes    Unsteady gait, chronic  - PT eval --> no skilled PT need  - OT--> Home OT  - fall precaution advised    #Dementia   -per conversation with daughter Marimar she wants to take her Mother home, she cares for her however she is admitted to hospital now  -Reorient as needed, patient unable to make decisions on her own   -pt does not appear to have an acute infection, she is afebrile, no wbc  -UA midly abnormal. f/u urine culture however she is asymptomatic  - not on meds  - normal TSH, B12, FA, vit D    #Blepharitis left eye  - improved  - was on erythromycin ointment at home    #Asthma/hx COPD  -Continue with Symbicort daily and albuterol prn  -pt refusing CXR    # Cig smoking  # Alcohol use:  counselled.   c/w NRT  CIWA  Thiamin, MVT, FA    # Mild protein-calorie malnutrition.  - nutrition eval noted    #DVT prophylaxis  -Lovenox       GOC;  FULL CODE. Palliative cx noted    Dispo:  per Daughter Marimar she does not want pt to go to [facility], and she will take her home . however she is admitted to hospital now. patient needs 24/7 supervision and assisst. called Marimar in two numbers. did not go through. called Son Estevan who HCP per chart: and Edward Barreto- [phone] home, [phone] cell , he is the POA and HCP . updated him today 12/26. he will fax me HCP form today. per Estevan, he spoke to mom, was not able to get much information from her including his sister Denise. sEtevan is thinking about placing her in [facility] or LTC. dispo pending arrangement, medically cleared.     2nd daughter- Caitie- [phone]

## 2023-12-27 NOTE — CONSULT NOTE ADULT - TIME BILLING
Total time spent including the following  [X] Physical chart review and documentation   An extensive review of the physical chart, electronic health record, and documentation was conducted to obtain collateral information including but not limited to:  - Current inpatient records (ED, H&P, primary team, and consultants if applicable)  - Inpatient values/results (biomarkers, immunoassays, imaging, and microbiology results)  - Outpatient records  - Prior inpatient records (if available)  - Social work assessments  - Current or proposed treatment plans  - Pharmacotherapy review (including I-STOP if applicable)  []review of medical literature related to pathogenesis, disease/illness progress, treatment and/or prognosis  [X]care coordination  [X]discussion with the primary team - Discussed with Dr. Boateng  [X]discussion with floor staff - spoke to nurse  []discussion with consultant(s)  [X]discussion with the patient, surrogate decision maker, or family  [X]Physical Exam and/or review of systems   [X]Formulation of assesment and plan   []Evaluating for response to treatment and side effects of opioids or benzodiazepines

## 2023-12-27 NOTE — CONSULT NOTE ADULT - ASSESSMENT
87 y/o F with PMH of asthma and dementia  was brought in by EMS from home, patient was found locked out of her house.  Patient being admitted for social issues and placement.     Dementia  - Lives with daughter Marimar at home  - A&Ox1-2 at baseline    EtOH use/Tobacco use disorder  - Nicotine patch per primary team  - Thiamin, MVT, FA per primary team    Advanced care planning  - See GOC note above  - HCP: daughter Caitie Rebolledo and son Estevan Alfredo  - FULL CODE    Encounter for palliative care  - Introduced the palliative care team to pt at bedside and son Estevan Alfredo on the phone, see GOC note above.   - Received HCP form from son.  - Will continue to follow for ongoing GOC conversations and supportive care.  Social Work needs to speak to HCP regarding dispo.

## 2023-12-27 NOTE — CONSULT NOTE ADULT - CONVERSATION DETAILS
Introduced palliative care team to pt's son Estevan on the phone. Estevan shared the pt's cognitive impairment makes it unsafe for her to live at home by herself and he shared that his mother's living habits with his sister Marimar are not in his mother's best interest. He shared that his mother and sister do not answer the phone often - thinking it is the government. Estevan plans to sell his mother's home and have her go to an snf. We discussed SW involvement and he is awaiting to hear back from them for assistance.   He sent me the HCP form as it was difficult to read the faxed copy. He stated his sister is the primary and he is the alternate but he is also the POA.   Ongoing GOC discussions to be had, supportive care given. Introduced palliative care team to pt's son Estevan on the phone. Estevan shared the pt's cognitive impairment makes it unsafe for her to live at home by herself and he shared that his mother's living habits with his sister Marimar are not in his mother's best interest. He shared that his mother and sister do not answer the phone often - thinking it is the government. Estevan plans to sell his mother's home and have her go to an penitentiary. We discussed SW involvement and he is awaiting to hear back from them for assistance.   He sent me the HCP form as it was difficult to read the faxed copy. He stated his sister is the primary and he is the alternate but he is also the POA.   Ongoing GOC discussions to be had, supportive care given.

## 2023-12-28 LAB
SARS-COV-2 RNA SPEC QL NAA+PROBE: SIGNIFICANT CHANGE UP
SARS-COV-2 RNA SPEC QL NAA+PROBE: SIGNIFICANT CHANGE UP

## 2023-12-28 PROCEDURE — 99232 SBSQ HOSP IP/OBS MODERATE 35: CPT

## 2023-12-28 RX ORDER — TUBERCULIN PURIFIED PROTEIN DERIVATIVE 5 [IU]/.1ML
5 INJECTION, SOLUTION INTRADERMAL ONCE
Refills: 0 | Status: DISCONTINUED | OUTPATIENT
Start: 2023-12-28 | End: 2023-12-28

## 2023-12-28 RX ORDER — CHOLECALCIFEROL (VITAMIN D3) 125 MCG
2000 CAPSULE ORAL DAILY
Refills: 0 | Status: DISCONTINUED | OUTPATIENT
Start: 2023-12-28 | End: 2023-12-29

## 2023-12-28 RX ADMIN — Medication 1 PATCH: at 11:00

## 2023-12-28 RX ADMIN — Medication 1 PATCH: at 07:00

## 2023-12-28 RX ADMIN — Medication 1 PATCH: at 19:00

## 2023-12-28 RX ADMIN — Medication 2000 UNIT(S): at 11:43

## 2023-12-28 NOTE — PROGRESS NOTE ADULT - ASSESSMENT
89 y/o F with PMH of asthma and dementia  was brought in by EMS from home, patient was found locked out of her house.  Patient being admitted for social issues and placement.     b/l Leg TP  - duplex neg DVT  - no ssx of cellulitis  - chronic venous stasis changes    Unsteady gait, chronic  - PT eval --> no skilled PT need  - OT--> Home OT  - fall precaution advised    #Dementia   -per conversation with daughter Marimar she wants to take her Mother home, she cares for her however she is admitted to hospital now  -Reorient as needed, patient unable to make decisions on her own   -pt does not appear to have an acute infection, she is afebrile, no wbc  -UA midly abnormal. f/u urine culture however she is asymptomatic  - not on meds  - normal TSH, B12, FA, vit D    #Blepharitis left eye  - improved  - was on erythromycin ointment at home    #Asthma/hx COPD  -Continue with Symbicort daily and albuterol prn  -pt refusing CXR    # Cig smoking  # Alcohol use:  counselled.   c/w NRT  CIWA  Thiamin, MVT, FA    # Mild protein-calorie malnutrition.  - nutrition eval noted    #DVT prophylaxis  -Lovenox       GOC;  FULL CODE. Palliative cx noted    Dispo:  per Daughter Marimar she does not want pt to go to [facility], and she will take her home . however she is admitted to hospital now. patient needs 24/7 supervision and assisst. called Marimar in two numbers. did not go through. called Edward Barreto who HCP per chart: and Edward Barreto- [phone] home, [phone] cell , he is the POA and HCP . updated him on 12/28 . he  faxed HCP form . vick Barreto, he spoke to mom, was not able to get much information from her including his sister Denise. Estevan is thinking about placing her in [facility] or LTC. dispo pending arrangement, patient is medically cleared. her daughter miles take her home once she herself is dced from 2surg. I updated her inside the room. edward Barreto wants to leave dispo planning on her. he is ok about Denise taking her to her house until he can arrange [facility]. he wants  to give him some info about detention. Per Estevan, he was able to speak to Denise once ad now she is not asnering her phone and he did not speak his mom since 2017 until recently 2 days ago when she was admitted to [facility].     2nd daughter- Caitie- [phone]. Per team, She does not wants to be involved in care.    87 y/o F with PMH of asthma and dementia  was brought in by EMS from home, patient was found locked out of her house.  Patient being admitted for social issues and placement.     b/l Leg TP  - duplex neg DVT  - no ssx of cellulitis  - chronic venous stasis changes    Unsteady gait, chronic  - PT eval --> no skilled PT need  - OT--> Home OT  - fall precaution advised    #Dementia   -per conversation with daughter Marimar she wants to take her Mother home, she cares for her however she is admitted to hospital now  -Reorient as needed, patient unable to make decisions on her own   -pt does not appear to have an acute infection, she is afebrile, no wbc  -UA midly abnormal. f/u urine culture however she is asymptomatic  - not on meds  - normal TSH, B12, FA, vit D    #Blepharitis left eye  - improved  - was on erythromycin ointment at home    #Asthma/hx COPD  -Continue with Symbicort daily and albuterol prn  -pt refusing CXR    # Cig smoking  # Alcohol use:  counselled.   c/w NRT  CIWA  Thiamin, MVT, FA    # Mild protein-calorie malnutrition.  - nutrition eval noted    #DVT prophylaxis  -Lovenox       GOC;  FULL CODE. Palliative cx noted    Dispo:  per Daughter Marimar she does not want pt to go to [facility], and she will take her home . however she is admitted to hospital now. patient needs 24/7 supervision and assisst. called Marimar in two numbers. did not go through. called Edward Barreto who HCP per chart: and Edward Barreto- [phone] home, [phone] cell , he is the POA and HCP . updated him on 12/28 . he  faxed HCP form . vick Barreto, he spoke to mom, was not able to get much information from her including his sister Denise. Estevan is thinking about placing her in [facility] or LTC. dispo pending arrangement, patient is medically cleared. her daughter miles take her home once she herself is dced from 2surg. I updated her inside the room. edward Barreto wants to leave dispo planning on her. he is ok about Denise taking her to her house until he can arrange [facility]. he wants  to give him some info about penitentiary. Per Estevan, he was able to speak to Denise once ad now she is not asnering her phone and he did not speak his mom since 2017 until recently 2 days ago when she was admitted to [facility].     2nd daughter- Caitie- [phone]. Per team, She does not wants to be involved in care.    89 y/o F with PMH of asthma and dementia  was brought in by EMS from home, patient was found locked out of her house.  Patient being admitted for social issues and placement.     b/l Leg TP  - duplex neg DVT  - no ssx of cellulitis  - chronic venous stasis changes    Unsteady gait, chronic  - PT eval --> no skilled PT need  - OT--> Home OT  - fall precaution advised    #Dementia   -per conversation with daughter Marimar she wants to take her Mother home, she cares for her however she is admitted to hospital now  -Reorient as needed, patient unable to make decisions on her own   -pt does not appear to have an acute infection, she is afebrile, no wbc  -UA midly abnormal. f/u urine culture however she is asymptomatic  - not on meds  - normal TSH, B12, FA, vit D    #Blepharitis left eye  - improved  - was on erythromycin ointment at home    #Asthma/hx COPD  -Continue with Symbicort daily and albuterol prn  -pt refusing CXR    # Cig smoking  # Alcohol use:  counselled.   c/w NRT  CIWA  Thiamin, MVT, FA    # Mild protein-calorie malnutrition.  - nutrition eval noted    #DVT prophylaxis  -Lovenox       GOC;  FULL CODE. Palliative cx noted    Dispo:  per Daughter Marimar she does not want pt to go to [facility], and she will take her home . however she is admitted to hospital now. patient needs 24/7 supervision and assisst. called Marimar in two numbers. did not go through. called Edward Barreto who HCP per chart: and Edward Barreto- [phone] home, [phone] cell , he is the POA and HCP . updated him on 12/28 . he  faxed HCP form . vick Barreto, he spoke to mom, was not able to get much information from her including his sister Denise. Estevan is thinking about placing her in [facility] or LTC. dispo pending arrangement, patient is medically cleared. her daughter miles take her home once she herself is dced from 2surg. I updated her inside the room. edward Barreto wants to leave dispo planning on her. he is ok about Denise taking her to her house until he can arrange [facility]. he wants  to give him some info about senior living. Per Estevan, he was able to speak to Denise once ad now she is not asnering her phone and he did not speak his mom since 2017 until recently 2 days ago when she was admitted to [facility].     2nd daughter- Caitie- [phone]. Per team, She does not wants to be involved in care.     addendum: at 12:50 pm: just spoke to son Estevan. he told me [facility] is willing to take her there once dced. They want to speak to CM and come to visit her here before they accept her. CM is aware. per team, daughter Marimar will not probably be discharged today.    89 y/o F with PMH of asthma and dementia  was brought in by EMS from home, patient was found locked out of her house.  Patient being admitted for social issues and placement.     b/l Leg TP  - duplex neg DVT  - no ssx of cellulitis  - chronic venous stasis changes    Unsteady gait, chronic  - PT eval --> no skilled PT need  - OT--> Home OT  - fall precaution advised    #Dementia   -per conversation with daughter Marimar she wants to take her Mother home, she cares for her however she is admitted to hospital now  -Reorient as needed, patient unable to make decisions on her own   -pt does not appear to have an acute infection, she is afebrile, no wbc  -UA midly abnormal. f/u urine culture however she is asymptomatic  - not on meds  - normal TSH, B12, FA, vit D    #Blepharitis left eye  - improved  - was on erythromycin ointment at home    #Asthma/hx COPD  -Continue with Symbicort daily and albuterol prn  -pt refusing CXR    # Cig smoking  # Alcohol use:  counselled.   c/w NRT  CIWA  Thiamin, MVT, FA    # Mild protein-calorie malnutrition.  - nutrition eval noted    #DVT prophylaxis  -Lovenox       GOC;  FULL CODE. Palliative cx noted    Dispo:  per Daughter Marimar she does not want pt to go to [facility], and she will take her home . however she is admitted to hospital now. patient needs 24/7 supervision and assisst. called Marimar in two numbers. did not go through. called Edward Barreto who HCP per chart: and Edward Barreto- [phone] home, [phone] cell , he is the POA and HCP . updated him on 12/28 . he  faxed HCP form . vick Barreto, he spoke to mom, was not able to get much information from her including his sister Denise. Estevan is thinking about placing her in [facility] or LTC. dispo pending arrangement, patient is medically cleared. her daughter miles take her home once she herself is dced from 2surg. I updated her inside the room. edward Barreto wants to leave dispo planning on her. he is ok about Denise taking her to her house until he can arrange [facility]. he wants  to give him some info about penitentiary. Per Estevan, he was able to speak to Denise once ad now she is not asnering her phone and he did not speak his mom since 2017 until recently 2 days ago when she was admitted to [facility].     2nd daughter- Caitie- [phone]. Per team, She does not wants to be involved in care.     addendum: at 12:50 pm: just spoke to son Estevan. he told me [facility] is willing to take her there once dced. They want to speak to CM and come to visit her here before they accept her. CM is aware. per team, daughter Marimar will not probably be discharged today.    87 y/o F with PMH of asthma and dementia  was brought in by EMS from home, patient was found locked out of her house.  Patient being admitted for social issues and placement.     b/l Leg TP  - duplex neg DVT  - no ssx of cellulitis  - chronic venous stasis changes    Unsteady gait, chronic  - PT eval --> no skilled PT need  - OT--> Home OT  - fall precaution advised    #Dementia   -per conversation with daughter Marimar she wants to take her Mother home, she cares for her however she is admitted to hospital now  -Reorient as needed, patient unable to make decisions on her own   -pt does not appear to have an acute infection, she is afebrile, no wbc  -UA midly abnormal. f/u urine culture however she is asymptomatic  - not on meds  - normal TSH, B12, FA, vit D    #Blepharitis left eye  - improved  - was on erythromycin ointment at home    #Asthma/hx COPD  -Continue with Symbicort daily and albuterol prn  -pt refusing CXR    # Cig smoking  # Alcohol use:  counselled.   c/w NRT  CIWA  Thiamin, MVT, FA    # Mild protein-calorie malnutrition.  - nutrition eval noted    #DVT prophylaxis  -Lovenox       GOC;  FULL CODE. Palliative cx noted    Dispo:  per Daughter Marimar she does not want pt to go to [facility], and she will take her home . however she is admitted to hospital now. patient needs 24/7 supervision and assisst. called Marimar in two numbers. did not go through. called Edward Barreto who HCP per chart: and Edward Barreto- [phone] home, [phone] cell , he is the POA and HCP . updated him on 12/28 . he  faxed HCP form . vick Barreto, he spoke to mom, was not able to get much information from her including his sister Denise. Estevan is thinking about placing her in [facility] or LTC. dispo pending arrangement, patient is medically cleared. her daughter miles take her home once she herself is dced from 2surg. I updated her inside the room. edward Barreto wants to leave dispo planning on her. he is ok about Denise taking her to her house until he can arrange [facility]. he wants  to give him some info about skilled nursing. Per Estevan, he was able to speak to Denise once ad now she is not asnering her phone and he did not speak his mom since 2017 until recently 2 days ago when she was admitted to [facility].     2nd daughter- Caitie- [phone]. Per team, She does not wants to be involved in care.     addendum: at 12:50 pm: just spoke to son Estevan. he told me [facility] is willing to take her there once dced. They want to speak to CM and come to visit her here before they accept her. CM is aware. per team, daughter Marimar will not probably be discharged today.   updated daughter Denise at 2surg   89 y/o F with PMH of asthma and dementia  was brought in by EMS from home, patient was found locked out of her house.  Patient being admitted for social issues and placement.     b/l Leg TP  - duplex neg DVT  - no ssx of cellulitis  - chronic venous stasis changes    Unsteady gait, chronic  - PT eval --> no skilled PT need  - OT--> Home OT  - fall precaution advised    #Dementia   -per conversation with daughter Marimar she wants to take her Mother home, she cares for her however she is admitted to hospital now  -Reorient as needed, patient unable to make decisions on her own   -pt does not appear to have an acute infection, she is afebrile, no wbc  -UA midly abnormal. f/u urine culture however she is asymptomatic  - not on meds  - normal TSH, B12, FA, vit D    #Blepharitis left eye  - improved  - was on erythromycin ointment at home    #Asthma/hx COPD  -Continue with Symbicort daily and albuterol prn  -pt refusing CXR    # Cig smoking  # Alcohol use:  counselled.   c/w NRT  CIWA  Thiamin, MVT, FA    # Mild protein-calorie malnutrition.  - nutrition eval noted    #DVT prophylaxis  -Lovenox       GOC;  FULL CODE. Palliative cx noted    Dispo:  per Daughter Marimar she does not want pt to go to [facility], and she will take her home . however she is admitted to hospital now. patient needs 24/7 supervision and assisst. called Marimar in two numbers. did not go through. called Edward Barreto who HCP per chart: and Edward Barreto- [phone] home, [phone] cell , he is the POA and HCP . updated him on 12/28 . he  faxed HCP form . vick Barreto, he spoke to mom, was not able to get much information from her including his sister Denise. Estevan is thinking about placing her in [facility] or LTC. dispo pending arrangement, patient is medically cleared. her daughter miles take her home once she herself is dced from 2surg. I updated her inside the room. edward Barreto wants to leave dispo planning on her. he is ok about Denise taking her to her house until he can arrange [facility]OS[facility]. he wants  to give him some info about penitentiary. Per Estevan, he was able to speak to Denise once ad now she is not asnering her phone and he did not speak his mom since 2017 until recently 2 days ago when she was admitted to [facility].     2nd daughter- Caitie- [phone]. Per team, She does not wants to be involved in care.     addendum: at 12:50 pm: just spoke to son Estevan. he told me [facility] is willing to take her there once dced. They want to speak to CM and come to visit her here before they accept her. CM is aware. per team, daughter Marimar will not probably be discharged today.   updated daughter Denise at 2surg

## 2023-12-28 NOTE — PROGRESS NOTE ADULT - SUBJECTIVE AND OBJECTIVE BOX
Medicine Progress Note    Patient is a 88y old  Female who presents with a chief complaint of Fatigue and weakness (27 Dec 2023 17:02)      SUBJECTIVE / OVERNIGHT EVENTS:    ADDITIONAL REVIEW OF SYSTEMS:    MEDICATIONS  (STANDING):  aspirin  chewable 81 milliGRAM(s) Oral daily  cholecalciferol 2000 Unit(s) Oral daily  folic acid 1 milliGRAM(s) Oral daily  multivitamin 1 Tablet(s) Oral daily  nicotine -   7 mG/24Hr(s) Patch 1 Patch Transdermal daily  thiamine 100 milliGRAM(s) Oral daily    MEDICATIONS  (PRN):  acetaminophen     Tablet .. 650 milliGRAM(s) Oral every 6 hours PRN Temp greater or equal to 38C (100.4F), Mild Pain (1 - 3)  aluminum hydroxide/magnesium hydroxide/simethicone Suspension 30 milliLiter(s) Oral every 4 hours PRN Dyspepsia  LORazepam     Tablet 2 milliGRAM(s) Oral every 2 hours PRN Symptom-triggered: 2 point increase in CIWA -Ar score and a total score of 7 or LESS  melatonin 3 milliGRAM(s) Oral at bedtime PRN Insomnia  nicotine  Polacrilex Lozenge 2 milliGRAM(s) Oral every 2 hours PRN Breakthrough cravings  ondansetron Injectable 4 milliGRAM(s) IV Push every 8 hours PRN Nausea and/or Vomiting  QUEtiapine 12.5 milliGRAM(s) Oral at bedtime PRN agitation    CAPILLARY BLOOD GLUCOSE        I&O's Summary      PHYSICAL EXAM:  Vital Signs Last 24 Hrs  T(C): 36.3 (28 Dec 2023 05:32), Max: 36.6 (27 Dec 2023 15:20)  T(F): 97.3 (28 Dec 2023 05:32), Max: 97.8 (27 Dec 2023 15:20)  HR: 94 (28 Dec 2023 05:32) (80 - 94)  BP: 125/73 (28 Dec 2023 05:32) (108/72 - 125/73)  BP(mean): --  RR: 18 (28 Dec 2023 05:32) (18 - 19)  SpO2: 92% (28 Dec 2023 05:32) (92% - 96%)    Parameters below as of 27 Dec 2023 20:42  Patient On (Oxygen Delivery Method): room air    GENERAL: Not in distress. Alert    HEENT: clear conjuctiva, MMM. no pallor or icterus  CARDIOVASCULAR: RRR S1, S2. No murmur/rubs/gallop  LUNGS: BLAE+, no rales, no wheezing, no rhonchi.    ABDOMEN: ND. Soft,  NT, no guarding / rebound / rigidity. BS normoactive  BACK: No spine tenderness.  EXTREMITIES: no edema. no leg or calf TP.  SKIN: warm and dry  PSYCHIATRIC: Calm.  No agitation.  CNS: AAO*2. at time confused with sundowning. moves limbs, follows commands    LABS:                    SARS-CoV-2: NotDetec (23 Dec 2023 15:45)      RADIOLOGY & ADDITIONAL TESTS:  Imaging from Last 24 Hours:    Electrocardiogram/QTc Interval:    COORDINATION OF CARE:  Care Discussed with Consultants/Other Providers:   Medicine Progress Note    Patient is a 88y old  Female who presents with a chief complaint of Fatigue and weakness (27 Dec 2023 17:02)      SUBJECTIVE / OVERNIGHT EVENTS:  seen and examined  Chart reviewed  No overnight events  Limb weakness improving with therapy  BM+  No pain  No complaints    ADDITIONAL REVIEW OF SYSTEMS:  denied fever/chills/CP/SOB/cough/palpitation/dizziness/abdominal pian/nausea/vomiting/diarrhoea/constipation/dysuria/leg or calf pain/headaches.all other ROS neg    MEDICATIONS  (STANDING):  aspirin  chewable 81 milliGRAM(s) Oral daily  cholecalciferol 2000 Unit(s) Oral daily  folic acid 1 milliGRAM(s) Oral daily  multivitamin 1 Tablet(s) Oral daily  nicotine -   7 mG/24Hr(s) Patch 1 Patch Transdermal daily  thiamine 100 milliGRAM(s) Oral daily    MEDICATIONS  (PRN):  acetaminophen     Tablet .. 650 milliGRAM(s) Oral every 6 hours PRN Temp greater or equal to 38C (100.4F), Mild Pain (1 - 3)  aluminum hydroxide/magnesium hydroxide/simethicone Suspension 30 milliLiter(s) Oral every 4 hours PRN Dyspepsia  LORazepam     Tablet 2 milliGRAM(s) Oral every 2 hours PRN Symptom-triggered: 2 point increase in CIWA -Ar score and a total score of 7 or LESS  melatonin 3 milliGRAM(s) Oral at bedtime PRN Insomnia  nicotine  Polacrilex Lozenge 2 milliGRAM(s) Oral every 2 hours PRN Breakthrough cravings  ondansetron Injectable 4 milliGRAM(s) IV Push every 8 hours PRN Nausea and/or Vomiting  QUEtiapine 12.5 milliGRAM(s) Oral at bedtime PRN agitation    CAPILLARY BLOOD GLUCOSE        I&O's Summary      PHYSICAL EXAM:  Vital Signs Last 24 Hrs  T(C): 36.3 (28 Dec 2023 05:32), Max: 36.6 (27 Dec 2023 15:20)  T(F): 97.3 (28 Dec 2023 05:32), Max: 97.8 (27 Dec 2023 15:20)  HR: 94 (28 Dec 2023 05:32) (80 - 94)  BP: 125/73 (28 Dec 2023 05:32) (108/72 - 125/73)  BP(mean): --  RR: 18 (28 Dec 2023 05:32) (18 - 19)  SpO2: 92% (28 Dec 2023 05:32) (92% - 96%)    Parameters below as of 27 Dec 2023 20:42  Patient On (Oxygen Delivery Method): room air    GENERAL: Not in distress. Alert    HEENT: clear conjuctiva, MMM. no pallor or icterus  CARDIOVASCULAR: RRR S1, S2. No murmur/rubs/gallop  LUNGS: BLAE+, no rales, no wheezing, no rhonchi.    ABDOMEN: ND. Soft,  NT, no guarding / rebound / rigidity. BS normoactive  BACK: No spine tenderness.  EXTREMITIES: no edema. no leg or calf TP.  SKIN: warm and dry  PSYCHIATRIC: Calm.  No agitation.  CNS: AAO*2. at time confused with sundowning. moves limbs, follows commands    LABS:                    SARS-CoV-2: NotDetec (23 Dec 2023 15:45)      RADIOLOGY & ADDITIONAL TESTS:  Imaging from Last 24 Hours:    Electrocardiogram/QTc Interval:    COORDINATION OF CARE:  Care Discussed with Consultants/Other Providers:

## 2023-12-29 ENCOUNTER — TRANSCRIPTION ENCOUNTER (OUTPATIENT)
Age: 88
End: 2023-12-29

## 2023-12-29 VITALS
DIASTOLIC BLOOD PRESSURE: 75 MMHG | TEMPERATURE: 97 F | RESPIRATION RATE: 15 BRPM | SYSTOLIC BLOOD PRESSURE: 118 MMHG | HEART RATE: 83 BPM | OXYGEN SATURATION: 97 %

## 2023-12-29 PROCEDURE — 84484 ASSAY OF TROPONIN QUANT: CPT

## 2023-12-29 PROCEDURE — 80307 DRUG TEST PRSMV CHEM ANLYZR: CPT

## 2023-12-29 PROCEDURE — 84443 ASSAY THYROID STIM HORMONE: CPT

## 2023-12-29 PROCEDURE — 81001 URINALYSIS AUTO W/SCOPE: CPT

## 2023-12-29 PROCEDURE — 82306 VITAMIN D 25 HYDROXY: CPT

## 2023-12-29 PROCEDURE — 83735 ASSAY OF MAGNESIUM: CPT

## 2023-12-29 PROCEDURE — 82652 VIT D 1 25-DIHYDROXY: CPT

## 2023-12-29 PROCEDURE — 93005 ELECTROCARDIOGRAM TRACING: CPT

## 2023-12-29 PROCEDURE — 85730 THROMBOPLASTIN TIME PARTIAL: CPT

## 2023-12-29 PROCEDURE — 80053 COMPREHEN METABOLIC PANEL: CPT

## 2023-12-29 PROCEDURE — 87635 SARS-COV-2 COVID-19 AMP PRB: CPT

## 2023-12-29 PROCEDURE — 82746 ASSAY OF FOLIC ACID SERUM: CPT

## 2023-12-29 PROCEDURE — 97535 SELF CARE MNGMENT TRAINING: CPT

## 2023-12-29 PROCEDURE — 0225U NFCT DS DNA&RNA 21 SARSCOV2: CPT

## 2023-12-29 PROCEDURE — 83880 ASSAY OF NATRIURETIC PEPTIDE: CPT

## 2023-12-29 PROCEDURE — 83605 ASSAY OF LACTIC ACID: CPT

## 2023-12-29 PROCEDURE — 93970 EXTREMITY STUDY: CPT

## 2023-12-29 PROCEDURE — G0378: CPT

## 2023-12-29 PROCEDURE — 71045 X-RAY EXAM CHEST 1 VIEW: CPT

## 2023-12-29 PROCEDURE — 99285 EMERGENCY DEPT VISIT HI MDM: CPT | Mod: 25

## 2023-12-29 PROCEDURE — 85025 COMPLETE CBC W/AUTO DIFF WBC: CPT

## 2023-12-29 PROCEDURE — 82607 VITAMIN B-12: CPT

## 2023-12-29 PROCEDURE — 99239 HOSP IP/OBS DSCHRG MGMT >30: CPT

## 2023-12-29 PROCEDURE — 36415 COLL VENOUS BLD VENIPUNCTURE: CPT

## 2023-12-29 PROCEDURE — 82803 BLOOD GASES ANY COMBINATION: CPT

## 2023-12-29 PROCEDURE — 85610 PROTHROMBIN TIME: CPT

## 2023-12-29 PROCEDURE — 84100 ASSAY OF PHOSPHORUS: CPT

## 2023-12-29 PROCEDURE — 97161 PT EVAL LOW COMPLEX 20 MIN: CPT

## 2023-12-29 PROCEDURE — 83690 ASSAY OF LIPASE: CPT

## 2023-12-29 PROCEDURE — 97166 OT EVAL MOD COMPLEX 45 MIN: CPT

## 2023-12-29 RX ORDER — THIAMINE MONONITRATE (VIT B1) 100 MG
1 TABLET ORAL
Qty: 30 | Refills: 0
Start: 2023-12-29 | End: 2024-01-27

## 2023-12-29 RX ORDER — CHOLECALCIFEROL (VITAMIN D3) 125 MCG
1 CAPSULE ORAL
Qty: 30 | Refills: 0
Start: 2023-12-29 | End: 2024-01-27

## 2023-12-29 RX ORDER — FLUTICASONE FUROATE AND VILANTEROL TRIFENATATE 100; 25 UG/1; UG/1
1 POWDER RESPIRATORY (INHALATION)
Qty: 1 | Refills: 0
Start: 2023-12-29 | End: 2024-01-27

## 2023-12-29 RX ORDER — NICOTINE POLACRILEX 2 MG
1 GUM BUCCAL
Qty: 2 | Refills: 0
Start: 2023-12-29 | End: 2024-01-27

## 2023-12-29 RX ORDER — FOLIC ACID 0.8 MG
1 TABLET ORAL
Qty: 30 | Refills: 0
Start: 2023-12-29 | End: 2024-01-27

## 2023-12-29 RX ORDER — FLUTICASONE FUROATE AND VILANTEROL TRIFENATATE 100; 25 UG/1; UG/1
1 POWDER RESPIRATORY (INHALATION)
Refills: 0 | DISCHARGE

## 2023-12-29 RX ORDER — QUETIAPINE FUMARATE 200 MG/1
0.5 TABLET, FILM COATED ORAL
Qty: 15 | Refills: 0
Start: 2023-12-29 | End: 2024-01-27

## 2023-12-29 RX ORDER — ASPIRIN/CALCIUM CARB/MAGNESIUM 324 MG
1 TABLET ORAL
Qty: 30 | Refills: 0
Start: 2023-12-29 | End: 2024-01-27

## 2023-12-29 RX ADMIN — Medication 1 PATCH: at 07:17

## 2023-12-29 RX ADMIN — Medication 3 MILLIGRAM(S): at 02:20

## 2023-12-29 RX ADMIN — QUETIAPINE FUMARATE 12.5 MILLIGRAM(S): 200 TABLET, FILM COATED ORAL at 02:24

## 2023-12-29 RX ADMIN — Medication 1 MILLIGRAM(S): at 12:26

## 2023-12-29 RX ADMIN — Medication 81 MILLIGRAM(S): at 12:26

## 2023-12-29 RX ADMIN — Medication 1 PATCH: at 12:27

## 2023-12-29 RX ADMIN — Medication 2000 UNIT(S): at 12:26

## 2023-12-29 RX ADMIN — Medication 650 MILLIGRAM(S): at 12:28

## 2023-12-29 RX ADMIN — Medication 100 MILLIGRAM(S): at 12:26

## 2023-12-29 RX ADMIN — Medication 1 TABLET(S): at 12:27

## 2023-12-29 NOTE — PROGRESS NOTE ADULT - SUBJECTIVE AND OBJECTIVE BOX
CC: Patient is a 88y old  Female who presents with a chief complaint of Fatigue and weakness (29 Dec 2023 06:50)      Interval History:  Patient seen and examined at bedside.  No overnight events  No complaints this morning    ALLERGIES:  No Known Allergies    MEDICATIONS  (STANDING):  aspirin  chewable 81 milliGRAM(s) Oral daily  cholecalciferol 2000 Unit(s) Oral daily  folic acid 1 milliGRAM(s) Oral daily  multivitamin 1 Tablet(s) Oral daily  nicotine -   7 mG/24Hr(s) Patch 1 Patch Transdermal daily  thiamine 100 milliGRAM(s) Oral daily    MEDICATIONS  (PRN):  acetaminophen     Tablet .. 650 milliGRAM(s) Oral every 6 hours PRN Temp greater or equal to 38C (100.4F), Mild Pain (1 - 3)  aluminum hydroxide/magnesium hydroxide/simethicone Suspension 30 milliLiter(s) Oral every 4 hours PRN Dyspepsia  LORazepam     Tablet 2 milliGRAM(s) Oral every 2 hours PRN Symptom-triggered: 2 point increase in CIWA -Ar score and a total score of 7 or LESS  melatonin 3 milliGRAM(s) Oral at bedtime PRN Insomnia  nicotine  Polacrilex Lozenge 2 milliGRAM(s) Oral every 2 hours PRN Breakthrough cravings  ondansetron Injectable 4 milliGRAM(s) IV Push every 8 hours PRN Nausea and/or Vomiting  QUEtiapine 12.5 milliGRAM(s) Oral at bedtime PRN agitation    Vital Signs Last 24 Hrs  T(F): 97.8 (28 Dec 2023 22:50), Max: 97.8 (28 Dec 2023 22:50)  HR: 93 (28 Dec 2023 22:50) (87 - 93)  BP: 101/66 (28 Dec 2023 22:50) (101/66 - 126/61)  RR: 18 (28 Dec 2023 22:50) (16 - 18)  SpO2: 94% (28 Dec 2023 22:50) (94% - 96%)  I&O's Summary    PHYSICAL EXAM:  GENERAL: NAD  NERVOUS SYSTEM:  CN II - XII intact; Sensation intact; follows commands  CHEST/LUNG: Clear to percussion bilaterally; No rales, rhonchi, wheezing, or rubs; normal respiratory effort, no intercostal retractions  HEART: Regular rate and rhythm; No murmurs, rubs, or gallops; No pitting edema  ABDOMEN: Soft, Nontender, Nondistended; Bowel sounds present; No HSM or masses  MUSCULOSKELETAL/EXTREMITIES:  2+ Peripheral Pulses, No clubbing or digital cyanosis  PSYCH: Appropriate affect, Alert & Awake    LABS:  COVID-19 PCR: NotDetec (12-28-23 @ 17:20)      Care Discussed with Consultants/Other Providers: Yes

## 2023-12-29 NOTE — DISCHARGE NOTE NURSING/CASE MANAGEMENT/SOCIAL WORK - PATIENT PORTAL LINK FT
You can access the FollowMyHealth Patient Portal offered by [facility] by registering at the following website: [email or web address]. By joining [employer]’s FollowMyHealth portal, you will also be able to view your health information using other applications (apps) compatible with our system. You can access the FollowMyHealth Patient Portal offered by [facility] by registering at the following website: [email or web address]. By joining [employer]’s FollowMyHealth portal, you will also be able to view your health information using other applications (apps) compatible with our system.

## 2023-12-29 NOTE — PROGRESS NOTE ADULT - REASON FOR ADMISSION
Fatigue and weakness

## 2023-12-29 NOTE — DISCHARGE NOTE NURSING/CASE MANAGEMENT/SOCIAL WORK - NSDCPEFALRISK_GEN_ALL_CORE
For information on Fall & Injury Prevention, visit: [email or web address] OR  [email or web address] OR  https://www.cdc.gov/steadi/patient.html For information on Fall & Injury Prevention, visit: [email or web address]-health-[email or web address]-mobility OR  https://[email or web address]/[email or web address]-tips-to-avoid-injury OR  https://www.cdc.gov/steadi/patient.html

## 2023-12-29 NOTE — PROGRESS NOTE ADULT - ASSESSMENT
87 y/o F with PMH of asthma and dementia  was brought in by EMS from home, patient was found locked out of her house.  Patient being admitted for social issues and placement.     b/l Leg TP  - duplex neg DVT  - no ssx of cellulitis  - chronic venous stasis changes    Unsteady gait, chronic  - PT eval --> no skilled PT need  - OT--> Home OT  - fall precaution advised    #Dementia   -per conversation with daughter Marimar she wants to take her Mother home, she cares for her however she is admitted to hospital now  -Reorient as needed, patient unable to make decisions on her own   -pt does not appear to have an acute infection, she is afebrile, no wbc  -UA midly abnormal. f/u urine culture however she is asymptomatic  - not on meds  - normal TSH, B12, FA, vit D    #Blepharitis left eye  - improved  - was on erythromycin ointment at home    #Asthma/hx COPD  -Continue with Symbicort daily and albuterol prn  -pt refusing CXR    # Cig smoking  # Alcohol use:  counselled.   c/w NRT  CIWA  Thiamin, MVT, FA    # Mild protein-calorie malnutrition.  - nutrition eval noted    #DVT prophylaxis  -Lovenox       GOC;  FULL CODE. Palliative cx noted    Dispo:  per Daughter Marimar she does not want pt to go to [facility], and she will take her home . however she is admitted to hospital now. patient needs 24/7 supervision and assisst. called Marimar in two numbers. did not go through. called Edward Barreto who HCP per chart: and Edward Barreto- [phone] home, [phone] cell , he is the POA and HCP . updated him on 12/28 . he  faxed HCP form . vick Barreto, he spoke to mom, was not able to get much information from her including his sister Denise. Estevan is thinking about placing her in [facility] or LTC. dispo pending arrangement, patient is medically cleared. her daughter miles take her home once she herself is dced from 2surg. I updated her inside the room. edward Barreto wants to leave dispo planning on her. he is ok about Denise taking her to her house until he can arrange [facility]. he wants  to give him some info about senior living. Per Estevan, he was able to speak to Denise once ad now she is not asnering her phone and he did not speak his mom since 2017 until recently 2 days ago when she was admitted to [facility].     2nd daughter- Caitie- [phone]. Per team, She does not wants to be involved in care.     addendum: at 12:50 pm: just spoke to son Estevan. he told me [facility] is willing to take her there once dced. They want to speak to CM and come to visit her here before they accept her. CM is aware. per team, daughter Marimar will not probably be discharged today.   updated daughter Denise at 2surg   87 y/o F with PMH of asthma and dementia  was brought in by EMS from home, patient was found locked out of her house.  Patient being admitted for social issues and placement.     b/l Leg TP  - duplex neg DVT  - no ssx of cellulitis  - chronic venous stasis changes    Unsteady gait, chronic  - PT eval --> no skilled PT need  - OT--> Home OT  - fall precaution advised    #Dementia   -per conversation with daughter Marimar she wants to take her Mother home, she cares for her however she is admitted to hospital now  -Reorient as needed, patient unable to make decisions on her own   -pt does not appear to have an acute infection, she is afebrile, no wbc  -UA midly abnormal. f/u urine culture however she is asymptomatic  - not on meds  - normal TSH, B12, FA, vit D    #Blepharitis left eye  - improved  - was on erythromycin ointment at home    #Asthma/hx COPD  -Continue with Symbicort daily and albuterol prn  -pt refusing CXR    # Cig smoking  # Alcohol use:  counselled.   c/w NRT  CIWA  Thiamin, MVT, FA    # Mild protein-calorie malnutrition.  - nutrition eval noted    #DVT prophylaxis  -Lovenox       GOC;  FULL CODE. Palliative cx noted    Dispo:  per Daughter Marimar she does not want pt to go to [facility], and she will take her home . however she is admitted to hospital now. patient needs 24/7 supervision and assisst. called Marimar in two numbers. did not go through. called Edward Barreto who HCP per chart: and Edward Barreto- [phone] home, [phone] cell , he is the POA and HCP . updated him on 12/28 . he  faxed HCP form . vick Barreto, he spoke to mom, was not able to get much information from her including his sister Denise. Estevan is thinking about placing her in [facility] or LTC. dispo pending arrangement, patient is medically cleared. her daughter miles take her home once she herself is dced from 2surg. I updated her inside the room. edward Barreto wants to leave dispo planning on her. he is ok about Denise taking her to her house until he can arrange [facility]. he wants  to give him some info about prison. Per Estevan, he was able to speak to Denise once ad now she is not asnering her phone and he did not speak his mom since 2017 until recently 2 days ago when she was admitted to [facility].     2nd daughter- Caitie- [phone]. Per team, She does not wants to be involved in care.     addendum: at 12:50 pm: just spoke to son Estevan. he told me [facility] is willing to take her there once dced. They want to speak to CM and come to visit her here before they accept her. CM is aware. per team, daughter Marimar will not probably be discharged today.   updated daughter Denise at 2surg   87 y/o F with PMH of asthma and dementia  was brought in by EMS from home, patient was found locked out of her house.  Patient being admitted for social issues and placement.     #Weakness and b/l Leg   - duplex neg DVT  - no ssx of cellulitis  - chronic venous stasis changes    Unsteady gait, chronic  - PT eval --> no skilled PT need  - OT--> Home OT  - fall precaution advised    #Dementia   -per conversation with daughter Marimar she wants to take her Mother home, she cares for her however she is admitted to hospital now  -Reorient as needed, patient unable to make decisions on her own   -pt does not appear to have an acute infection, she is afebrile, no wbc  -UA midly abnormal. f/u urine culture however she is asymptomatic  - not on meds  - normal TSH, B12, FA, vit D    #Blepharitis left eye  - improved  - was on erythromycin ointment at home    #Asthma/hx COPD  -Continue with Symbicort daily and albuterol prn  -pt refusing CXR    # Cig smoking  # Alcohol use:  counselled.   c/w NRT  CIWA  Thiamin, MVT, FA    # Mild protein-calorie malnutrition.  - nutrition eval noted    #DVT prophylaxis  -Lovenox       GOC;  FULL CODE. Palliative cx noted    Dispo:  per Daughter Marimar she does not want pt to go to [facility], and she will take her home . however she is admitted to hospital now. patient needs 24/7 supervision and assisst. called Marimar in two numbers. did not go through. called Edward Barreto who HCP per chart: and Edward Barreto- [phone] home, [phone] cell , he is the POA and HCP . updated him on 12/28 . he  faxed HCP form . per Estevan, he spoke to mom, was not able to get much information from her including his sister Denise. Estevan is thinking about placing her in [facility] or LTC. dispo pending arrangement, patient is medically cleared. her daughter miles take her home once she herself is dced from 2surg. I updated her inside the room. edward Barreto wants to leave dispo planning on her. he is ok about Denise taking her to her house until he can arrange [facility]. he wants  to give him some info about [facility]. Per Estevan, he was able to speak to Denise once ad now she is not asnering her phone and he did not speak his mom since 2017 until recently 2 days ago when she was admitted to [facility].     2nd daughter- Caitie- [phone]. Per team, She does not wants to be involved in care.     addendum: at 12:50 pm: just spoke to son Estevan. he told me [facility] is willing to take her there once dced. They want to speak to CM and come to visit her here before they accept her. CM is aware.    12/29 Patient stable for discharge unable to reach Marimar 89 y/o F with PMH of asthma and dementia  was brought in by EMS from home, patient was found locked out of her house.  Patient being admitted for social issues and placement.     #Weakness and b/l Leg   - duplex neg DVT  - no ssx of cellulitis  - chronic venous stasis changes    Unsteady gait, chronic  - PT eval --> no skilled PT need  - OT--> Home OT  - fall precaution advised    #Dementia   -per conversation with daughter Marimar she wants to take her Mother home, she cares for her however she is admitted to hospital now  -Reorient as needed, patient unable to make decisions on her own   -pt does not appear to have an acute infection, she is afebrile, no wbc  -UA midly abnormal. f/u urine culture however she is asymptomatic  - not on meds  - normal TSH, B12, FA, vit D    #Blepharitis left eye  - improved  - was on erythromycin ointment at home    #Asthma/hx COPD  -Continue with Symbicort daily and albuterol prn  -pt refusing CXR    # Cig smoking  # Alcohol use:  counselled.   c/w NRT  CIWA  Thiamin, MVT, FA    # Mild protein-calorie malnutrition.  - nutrition eval noted    #DVT prophylaxis  -Lovenox       GOC;  FULL CODE. Palliative cx noted    Dispo:  per Daughter Marimar she does not want pt to go to [facility], and she will take her home . however she is admitted to hospital now. patient needs 24/7 supervision and assisst. called Marimar in two numbers. did not go through. called Edward Brareto who HCP per chart: and Edward Barreto- [phone] home, [phone] cell , he is the POA and HCP . updated him on 12/28 . he  faxed HCP form . per Estevan, he spoke to mom, was not able to get much information from her including his sister Denise. Estevan is thinking about placing her in [facility] or LTC. dispo pending arrangement, patient is medically cleared. her daughter miles take her home once she herself is dced from 2surg. I updated her inside the room. edward Barreto wants to leave dispo planning on her. he is ok about Denise taking her to her house until he can arrange [facility]. he wants  to give him some info about [facility]. Per Estevan, he was able to speak to Denise once ad now she is not asnering her phone and he did not speak his mom since 2017 until recently 2 days ago when she was admitted to [facility].     2nd daughter- Caitie- [phone]. Per team, She does not wants to be involved in care.     addendum: at 12:50 pm: just spoke to son Estevan. he told me [facility] is willing to take her there once dced. They want to speak to CM and come to visit her here before they accept her. CM is aware.    12/29 Patient stable for discharge unable to reach Marimar

## 2023-12-29 NOTE — CHART NOTE - NSCHARTNOTEFT_GEN_A_CORE
NUTRITION FOLLOW UP    SOURCE: Patient [X]   Family [ ]    Medical Record [X]    DIET: Diet, Regular (12-23-23 @ 22:06) [Active]  ALLERGIES: NKFA    IMPRESSION:  Met with pt this AM at bedside. Pt was seated upright and able to articulate her nutrition hx well.     PATIENT REPORT:[ ] nausea  [ ] vomiting [ ] diarrhea [ ] constipation  [ ]chewing problems [ ] swallowing issues  [ ] other: no GI distress    PO INTAKE: % per RN flowsheets    CURRENT WEIGHT: 149.9 lbs (12/23)  WEIGHT HX: N/A    PERTINENT MEDS:   Pertinent Medications: MEDICATIONS  (STANDING):  aspirin  chewable 81 milliGRAM(s) Oral daily  cholecalciferol 2000 Unit(s) Oral daily  folic acid 1 milliGRAM(s) Oral daily  multivitamin 1 Tablet(s) Oral daily  nicotine -   7 mG/24Hr(s) Patch 1 Patch Transdermal daily  thiamine 100 milliGRAM(s) Oral daily    MEDICATIONS  (PRN):  acetaminophen     Tablet .. 650 milliGRAM(s) Oral every 6 hours PRN Temp greater or equal to 38C (100.4F), Mild Pain (1 - 3)  aluminum hydroxide/magnesium hydroxide/simethicone Suspension 30 milliLiter(s) Oral every 4 hours PRN Dyspepsia  LORazepam     Tablet 2 milliGRAM(s) Oral every 2 hours PRN Symptom-triggered: 2 point increase in CIWA -Ar score and a total score of 7 or LESS  melatonin 3 milliGRAM(s) Oral at bedtime PRN Insomnia  nicotine  Polacrilex Lozenge 2 milliGRAM(s) Oral every 2 hours PRN Breakthrough cravings  ondansetron Injectable 4 milliGRAM(s) IV Push every 8 hours PRN Nausea and/or Vomiting  QUEtiapine 12.5 milliGRAM(s) Oral at bedtime PRN agitation    PERTINENT LABS:   12-24 Phos 3.4 mg/dL 12-23 Alb 3.4 g/dL    SKIN: no pressure injury per RN flowsheets    EDEMA: N/A    ESTIMATED NEEDS:   [X] no change since previous assessment     PREVIOUS NUTRITION DIAGNOSIS: Mild, acute on chronic malnutrition    NUTRITION DIAGNOSIS is: [X] Ongoing - addressed with MVI (pt declined ONS)    NEW NUTRITION DIAGNOSIS: N/A    MONITORING AND EVALUATION:   Current diet order is appropriate and is well tolerated, will continue to monitor:  - Food and nutrient intake/POs  - Nutrition related lab values (no new labs since 12/24)  - Weight/weight trends  - GI functions    NUTRITION RECOMMENDATIONS:   1. Continue with regular diet  - Encourage high kcal/high protein foods  - Honor food preferences  - Appreciate continued PO intake % documentation  2. Appreciate weekly weight trends  3. Continue with folic acid, thiamine in setting of alcohol use    Christy Mejia RDN also available via TEAMS NUTRITION FOLLOW UP    SOURCE: Patient [X]   Family [ ]    Medical Record [X]    DIET: Diet, Regular (12-23-23 @ 22:06) [Active]  ALLERGIES: NKFA    IMPRESSION:  Attempted to meet with pt this AM - pt unable to participate in interview due to cognitive status. Appreciate debrief from RN. Per RN, pt eating well and without N/V/D/C, stated last BM 12/26. RD will continue to follow and be available, as needed.     PATIENT REPORT: [X] other: no GI distress    PO INTAKE: % per RN flowsheets    CURRENT WEIGHT: 149.9 lbs (12/23)  WEIGHT HX: N/A    PERTINENT MEDS:   Pertinent Medications: MEDICATIONS  (STANDING):  aspirin  chewable 81 milliGRAM(s) Oral daily  cholecalciferol 2000 Unit(s) Oral daily  folic acid 1 milliGRAM(s) Oral daily  multivitamin 1 Tablet(s) Oral daily  nicotine -   7 mG/24Hr(s) Patch 1 Patch Transdermal daily  thiamine 100 milliGRAM(s) Oral daily    MEDICATIONS  (PRN):  acetaminophen     Tablet .. 650 milliGRAM(s) Oral every 6 hours PRN Temp greater or equal to 38C (100.4F), Mild Pain (1 - 3)  aluminum hydroxide/magnesium hydroxide/simethicone Suspension 30 milliLiter(s) Oral every 4 hours PRN Dyspepsia  LORazepam     Tablet 2 milliGRAM(s) Oral every 2 hours PRN Symptom-triggered: 2 point increase in CIWA -Ar score and a total score of 7 or LESS  melatonin 3 milliGRAM(s) Oral at bedtime PRN Insomnia  nicotine  Polacrilex Lozenge 2 milliGRAM(s) Oral every 2 hours PRN Breakthrough cravings  ondansetron Injectable 4 milliGRAM(s) IV Push every 8 hours PRN Nausea and/or Vomiting  QUEtiapine 12.5 milliGRAM(s) Oral at bedtime PRN agitation    PERTINENT LABS:   12-24 Phos 3.4 mg/dL 12-23 Alb 3.4 g/dL    SKIN: no pressure injury per RN flowsheets    EDEMA: N/A    ESTIMATED NEEDS:   [X] no change since previous assessment     PREVIOUS NUTRITION DIAGNOSIS: Mild, acute on chronic malnutrition    NUTRITION DIAGNOSIS is: [X] Ongoing - addressed with MVI (pt declined ONS)    NEW NUTRITION DIAGNOSIS: N/A    MONITORING AND EVALUATION:   Current diet order is appropriate and is well tolerated, will continue to monitor:  - Food and nutrient intake/POs  - Nutrition related lab values (no new labs since 12/24)  - Weight/weight trends  - GI functions    NUTRITION RECOMMENDATIONS:   1. Continue with regular diet  - Encourage high kcal/high protein foods  - Honor food preferences  - Appreciate continued PO intake % documentation  2. Appreciate weekly weight trends  3. Continue with folic acid, thiamine in setting of alcohol use    Christy Mejia RDN also available via TEAMS

## 2023-12-29 NOTE — PROGRESS NOTE ADULT - NUTRITIONAL ASSESSMENT
This patient has been assessed with a concern for Malnutrition and has been determined to have a diagnosis/diagnoses of Mild protein-calorie malnutrition.    This patient is being managed with:   Diet Regular-  Entered: Dec 23 2023 10:03PM  

## 2024-04-24 NOTE — PHYSICAL THERAPY INITIAL EVALUATION ADULT - LEVEL OF INDEPENDENCE: SUPINE/SIT, REHAB EVAL
----- Message from Mariam Funes MA sent at 4/23/2024 11:59 AM EDT -----  Regarding: Urine micro  04/23/24 12:00 PM    Hello, our patient Trevor Lyons has had Urine Albumin/Creatinine Ratio completed/performed. Please assist in updating the patient chart by pulling the Care Everywhere (CE) document. The date of service  2/6  Thank you,  Mariam Funes MA  [facility]        independent

## 2024-07-04 ENCOUNTER — INPATIENT (INPATIENT)
Facility: HOSPITAL | Age: 89
LOS: 2 days | Discharge: NURSING, REHAB OR LONG-TERM CARE | DRG: 690 | End: 2024-07-07
Attending: INTERNAL MEDICINE | Admitting: INTERNAL MEDICINE
Payer: MEDICARE

## 2024-07-04 VITALS
TEMPERATURE: 97 F | OXYGEN SATURATION: 95 % | HEART RATE: 90 BPM | HEIGHT: 63 IN | SYSTOLIC BLOOD PRESSURE: 127 MMHG | RESPIRATION RATE: 18 BRPM | DIASTOLIC BLOOD PRESSURE: 81 MMHG | WEIGHT: 141.1 LBS

## 2024-07-04 PROBLEM — G31.84 MILD COGNITIVE IMPAIRMENT OF UNCERTAIN OR UNKNOWN ETIOLOGY: Chronic | Status: ACTIVE | Noted: 2023-12-24

## 2024-07-04 LAB
ALBUMIN SERPL ELPH-MCNC: 2.8 G/DL — LOW (ref 3.3–5)
ALP SERPL-CCNC: 123 U/L — HIGH (ref 40–120)
ALT FLD-CCNC: 14 U/L — SIGNIFICANT CHANGE UP (ref 10–45)
ANION GAP SERPL CALC-SCNC: 5 MMOL/L — SIGNIFICANT CHANGE UP (ref 5–17)
APPEARANCE UR: CLEAR — SIGNIFICANT CHANGE UP
APTT BLD: 28 SEC — SIGNIFICANT CHANGE UP (ref 24.5–35.6)
AST SERPL-CCNC: 16 U/L — SIGNIFICANT CHANGE UP (ref 10–40)
BACTERIA # UR AUTO: NEGATIVE /HPF — SIGNIFICANT CHANGE UP
BASOPHILS # BLD AUTO: 0.12 K/UL — SIGNIFICANT CHANGE UP (ref 0–0.2)
BASOPHILS NFR BLD AUTO: 1.1 % — SIGNIFICANT CHANGE UP (ref 0–2)
BILIRUB SERPL-MCNC: 0.3 MG/DL — SIGNIFICANT CHANGE UP (ref 0.2–1.2)
BILIRUB UR-MCNC: NEGATIVE — SIGNIFICANT CHANGE UP
BUN SERPL-MCNC: 21 MG/DL — SIGNIFICANT CHANGE UP (ref 7–23)
CALCIUM SERPL-MCNC: 9.6 MG/DL — SIGNIFICANT CHANGE UP (ref 8.4–10.5)
CHLORIDE SERPL-SCNC: 106 MMOL/L — SIGNIFICANT CHANGE UP (ref 96–108)
CO2 SERPL-SCNC: 31 MMOL/L — SIGNIFICANT CHANGE UP (ref 22–31)
COLOR SPEC: YELLOW — SIGNIFICANT CHANGE UP
COMMENT - URINE: SIGNIFICANT CHANGE UP
CREAT SERPL-MCNC: 0.8 MG/DL — SIGNIFICANT CHANGE UP (ref 0.5–1.3)
DIFF PNL FLD: ABNORMAL
EGFR: 71 ML/MIN/1.73M2 — SIGNIFICANT CHANGE UP
EOSINOPHIL # BLD AUTO: 0.46 K/UL — SIGNIFICANT CHANGE UP (ref 0–0.5)
EOSINOPHIL NFR BLD AUTO: 4.2 % — SIGNIFICANT CHANGE UP (ref 0–6)
EPI CELLS # UR: 2 — SIGNIFICANT CHANGE UP
FLUAV AG NPH QL: SIGNIFICANT CHANGE UP
FLUBV AG NPH QL: SIGNIFICANT CHANGE UP
GLUCOSE SERPL-MCNC: 99 MG/DL — SIGNIFICANT CHANGE UP (ref 70–99)
GLUCOSE UR QL: NEGATIVE MG/DL — SIGNIFICANT CHANGE UP
HCT VFR BLD CALC: 39 % — SIGNIFICANT CHANGE UP (ref 34.5–45)
HGB BLD-MCNC: 12.6 G/DL — SIGNIFICANT CHANGE UP (ref 11.5–15.5)
IMM GRANULOCYTES NFR BLD AUTO: 0.8 % — SIGNIFICANT CHANGE UP (ref 0–0.9)
INR BLD: 0.95 RATIO — SIGNIFICANT CHANGE UP (ref 0.85–1.18)
KETONES UR-MCNC: NEGATIVE MG/DL — SIGNIFICANT CHANGE UP
LACTATE SERPL-SCNC: 0.7 MMOL/L — SIGNIFICANT CHANGE UP (ref 0.7–2)
LEUKOCYTE ESTERASE UR-ACNC: ABNORMAL
LIDOCAIN IGE QN: 20 U/L — SIGNIFICANT CHANGE UP (ref 16–77)
LYMPHOCYTES # BLD AUTO: 1.53 K/UL — SIGNIFICANT CHANGE UP (ref 1–3.3)
LYMPHOCYTES # BLD AUTO: 14 % — SIGNIFICANT CHANGE UP (ref 13–44)
MCHC RBC-ENTMCNC: 30.4 PG — SIGNIFICANT CHANGE UP (ref 27–34)
MCHC RBC-ENTMCNC: 32.3 GM/DL — SIGNIFICANT CHANGE UP (ref 32–36)
MCV RBC AUTO: 94 FL — SIGNIFICANT CHANGE UP (ref 80–100)
MONOCYTES # BLD AUTO: 0.96 K/UL — HIGH (ref 0–0.9)
MONOCYTES NFR BLD AUTO: 8.8 % — SIGNIFICANT CHANGE UP (ref 2–14)
NEUTROPHILS # BLD AUTO: 7.73 K/UL — HIGH (ref 1.8–7.4)
NEUTROPHILS NFR BLD AUTO: 71.1 % — SIGNIFICANT CHANGE UP (ref 43–77)
NITRITE UR-MCNC: NEGATIVE — SIGNIFICANT CHANGE UP
NRBC # BLD: 0 /100 WBCS — SIGNIFICANT CHANGE UP (ref 0–0)
PH UR: 5.5 — SIGNIFICANT CHANGE UP (ref 5–8)
PLATELET # BLD AUTO: 378 K/UL — SIGNIFICANT CHANGE UP (ref 150–400)
POTASSIUM SERPL-MCNC: 4.7 MMOL/L — SIGNIFICANT CHANGE UP (ref 3.5–5.3)
POTASSIUM SERPL-SCNC: 4.7 MMOL/L — SIGNIFICANT CHANGE UP (ref 3.5–5.3)
PROT SERPL-MCNC: 7 G/DL — SIGNIFICANT CHANGE UP (ref 6–8.3)
PROT UR-MCNC: NEGATIVE MG/DL — SIGNIFICANT CHANGE UP
PROTHROM AB SERPL-ACNC: 11.1 SEC — SIGNIFICANT CHANGE UP (ref 9.5–13)
RBC # BLD: 4.15 M/UL — SIGNIFICANT CHANGE UP (ref 3.8–5.2)
RBC # FLD: 15.3 % — HIGH (ref 10.3–14.5)
RBC CASTS # UR COMP ASSIST: 3 /HPF — SIGNIFICANT CHANGE UP (ref 0–4)
RSV RNA NPH QL NAA+NON-PROBE: SIGNIFICANT CHANGE UP
SARS-COV-2 RNA SPEC QL NAA+PROBE: SIGNIFICANT CHANGE UP
SODIUM SERPL-SCNC: 142 MMOL/L — SIGNIFICANT CHANGE UP (ref 135–145)
SP GR SPEC: 1.02 — SIGNIFICANT CHANGE UP (ref 1–1.03)
TROPONIN I, HIGH SENSITIVITY RESULT: 22.4 NG/L — SIGNIFICANT CHANGE UP
UROBILINOGEN FLD QL: 0.2 MG/DL — SIGNIFICANT CHANGE UP (ref 0.2–1)
WBC # BLD: 10.89 K/UL — HIGH (ref 3.8–10.5)
WBC # FLD AUTO: 10.89 K/UL — HIGH (ref 3.8–10.5)
WBC UR QL: 2 /HPF — SIGNIFICANT CHANGE UP (ref 0–5)

## 2024-07-04 PROCEDURE — 71045 X-RAY EXAM CHEST 1 VIEW: CPT | Mod: 26

## 2024-07-04 PROCEDURE — 99285 EMERGENCY DEPT VISIT HI MDM: CPT

## 2024-07-04 RX ORDER — FOLIC ACID
1 POWDER (GRAM) MISCELLANEOUS DAILY
Refills: 0 | Status: DISCONTINUED | OUTPATIENT
Start: 2024-07-05 | End: 2024-07-07

## 2024-07-04 RX ORDER — CEFTRIAXONE SODIUM 500 MG
1000 VIAL (EA) INJECTION EVERY 24 HOURS
Refills: 0 | Status: DISCONTINUED | OUTPATIENT
Start: 2024-07-05 | End: 2024-07-05

## 2024-07-04 RX ORDER — IPRATROPIUM BROMIDE AND ALBUTEROL SULFATE .5; 3 MG/3ML; MG/3ML
3 SOLUTION RESPIRATORY (INHALATION) EVERY 4 HOURS
Refills: 0 | Status: DISCONTINUED | OUTPATIENT
Start: 2024-07-04 | End: 2024-07-07

## 2024-07-04 RX ORDER — ASPIRIN 325 MG/1
81 TABLET, FILM COATED ORAL DAILY
Refills: 0 | Status: DISCONTINUED | OUTPATIENT
Start: 2024-07-05 | End: 2024-07-07

## 2024-07-04 RX ORDER — ONDANSETRON HYDROCHLORIDE 2 MG/ML
4 INJECTION INTRAMUSCULAR; INTRAVENOUS ONCE
Refills: 0 | Status: COMPLETED | OUTPATIENT
Start: 2024-07-04 | End: 2024-07-04

## 2024-07-04 RX ORDER — CEFPODOXIME PROXETIL 50 MG/5 ML
1 SUSPENSION, RECONSTITUTED, ORAL (ML) ORAL
Qty: 20 | Refills: 0
Start: 2024-07-04 | End: 2024-07-13

## 2024-07-04 RX ORDER — ACETAMINOPHEN 325 MG
1000 TABLET ORAL ONCE
Refills: 0 | Status: COMPLETED | OUTPATIENT
Start: 2024-07-04 | End: 2024-07-04

## 2024-07-04 RX ORDER — THIAMINE HCL 100 MG
100 TABLET ORAL DAILY
Refills: 0 | Status: DISCONTINUED | OUTPATIENT
Start: 2024-07-05 | End: 2024-07-07

## 2024-07-04 RX ORDER — ACETAMINOPHEN 325 MG
650 TABLET ORAL EVERY 6 HOURS
Refills: 0 | Status: DISCONTINUED | OUTPATIENT
Start: 2024-07-04 | End: 2024-07-07

## 2024-07-04 RX ORDER — BUDESONIDE/FORMOTEROL FUMARATE 160-4.5MCG
2 HFA AEROSOL WITH ADAPTER (GRAM) INHALATION EVERY 12 HOURS
Refills: 0 | Status: DISCONTINUED | OUTPATIENT
Start: 2024-07-04 | End: 2024-07-07

## 2024-07-04 RX ORDER — SODIUM CHLORIDE 0.9 % (FLUSH) 0.9 %
500 SYRINGE (ML) INJECTION ONCE
Refills: 0 | Status: COMPLETED | OUTPATIENT
Start: 2024-07-04 | End: 2024-07-04

## 2024-07-04 RX ORDER — VANCOMYCIN HYDROCHLORIDE 50 MG/ML
1000 KIT ORAL ONCE
Refills: 0 | Status: COMPLETED | OUTPATIENT
Start: 2024-07-04 | End: 2024-07-04

## 2024-07-04 RX ORDER — ENOXAPARIN SODIUM 100 MG/ML
40 INJECTION SUBCUTANEOUS EVERY 24 HOURS
Refills: 0 | Status: DISCONTINUED | OUTPATIENT
Start: 2024-07-04 | End: 2024-07-07

## 2024-07-04 RX ORDER — PIPERACILLIN SODIUM AND TAZOBACTAM SODIUM 3; .375 G/15ML; G/15ML
3.38 INJECTION, POWDER, LYOPHILIZED, FOR SOLUTION INTRAVENOUS ONCE
Refills: 0 | Status: COMPLETED | OUTPATIENT
Start: 2024-07-04 | End: 2024-07-04

## 2024-07-04 RX ADMIN — VANCOMYCIN HYDROCHLORIDE 1000 MILLIGRAM(S): KIT at 17:34

## 2024-07-04 RX ADMIN — ONDANSETRON HYDROCHLORIDE 4 MILLIGRAM(S): 2 INJECTION INTRAMUSCULAR; INTRAVENOUS at 20:04

## 2024-07-04 RX ADMIN — PIPERACILLIN SODIUM AND TAZOBACTAM SODIUM 3.38 GRAM(S): 3; .375 INJECTION, POWDER, LYOPHILIZED, FOR SOLUTION INTRAVENOUS at 15:46

## 2024-07-04 RX ADMIN — Medication 1000 MILLIGRAM(S): at 15:32

## 2024-07-04 RX ADMIN — Medication 1000 MILLIGRAM(S): at 15:47

## 2024-07-04 RX ADMIN — Medication 400 MILLIGRAM(S): at 15:17

## 2024-07-04 RX ADMIN — PIPERACILLIN SODIUM AND TAZOBACTAM SODIUM 200 GRAM(S): 3; .375 INJECTION, POWDER, LYOPHILIZED, FOR SOLUTION INTRAVENOUS at 15:16

## 2024-07-04 RX ADMIN — Medication 500 MILLILITER(S): at 14:20

## 2024-07-04 RX ADMIN — Medication 12.5 MILLIGRAM(S): at 22:44

## 2024-07-04 RX ADMIN — VANCOMYCIN HYDROCHLORIDE 250 MILLIGRAM(S): KIT at 16:34

## 2024-07-04 RX ADMIN — Medication 1000 MILLILITER(S): at 13:40

## 2024-07-04 NOTE — ED PROVIDER NOTE - EKG/XRAY ADDITIONAL INFORMATION
Normal sinus rhythm at 89 bpm, NV interval 158 ms, QRS duration 182 ms, QTc interval of 418 ms, there are no ST elevations or depressions.

## 2024-07-04 NOTE — ED ADULT NURSE NOTE - CHIEF COMPLAINT QUOTE
Pt BIB EMS from [facility] for lethargy starting this morning. Pt with hx dementia. No fevers. Pt denies pain or discomfort. Per staff at facility, pt noted to have a cough for a few weeks. FS= 122 by EMS.

## 2024-07-04 NOTE — H&P ADULT - TIME BILLING
h Management of acute medical problem, thorough review of labs, imaging and previous chart and imaging studies.   In my medical judgement, I deem it necessary for this patient to require at least 24-48 hours of acute treatment and reassessment, event monitoring that may require immediate intervention and further diagnostic and medical evaluation to establish a definitive treatment plan.

## 2024-07-04 NOTE — H&P ADULT - GASTROINTESTINAL
soft/nontender/nondistended/normal active bowel sounds/no guarding/no rigidity/no organomegaly/no palpable blue

## 2024-07-04 NOTE — ED PROVIDER NOTE - OBJECTIVE STATEMENT
88-year-old female with a past medical history of COPD, dementia, nicotine dependence, presenting with generalized weakness for the past few days.  Of note she is coming from [facility]  assisted living facility.  Per EMS report the patient has been having a mild cough for the past few days and has been more fatigued than normal.  She is unable to give us a history due to her dementia. Denies any chest pain, abdominal pain, shortness of breath, nausea/vomiting, headaches,   diarrhea   syncope, hematuria, dysuria, urinary symptoms, subjective neurological deficits.

## 2024-07-04 NOTE — ED ADULT NURSE NOTE - ISAR SCREEN YN
Oliver Mallory (56 y.o. Male)     TO [facility]  From Jo Ann( at [facility]) [phone]    Pt has dialysis chair Monday, Wed. Fri at [facility] [place] [address] [address]  Phone:  [phone]  Fax:          Patient:     Oliver Mallory MRN:  [id number]   [address] [facility] [address] :  1964  SSN:    Phone: [phone] Sex:  M      INSURANCE PAYOR PLAN GROUP # SUBSCRIBER ID   Primary:    [facility] MEDICAID [id number]   Admitting Diagnosis: Acute renal failure (ARF) (CMS/HCC) [N17.9]  Order Date:  Aug 6, 2020         Inpatient Case Management  Consult       (Order ID: [id number])     Diagnosis:         Priority:  Routine Expected Date:   Expiration Date:        Interval:   Count:    Comments: Please arrange for post HD [place] 1 g IV post each HD (Mon, Wed, Fri) until 20.  Check cbc, cmp, crp weekly while on IV abx.  FAX orders to [phone], and call [phone] with final arrangements.  Arrange for f/u with me in 1 week post discharge.  Reason for Consult? abx     Specimen Type:   Specimen Source:   Specimen Taken Date:   Specimen Taken Time:                   Authorizing Provider:Joey Graham MD  Authorizing Provider's NPI: [id number]  Order Entered By: Joey Graham MD 2020 11:32 AM     Electronically signed by: Joey Graham MD 2020 11:32 AM              Date of Birth Social Security Number Address Home Phone MRN    1964  [address] [facility] [address] [phone] [id number]    Ep[employer]op[employer] Marital Status          Congregation        Admission Date Admission Type Admitting Provider Attending Provider Department, Room/Bed    20 Emergency Mustapha Ramires MD Kalantar, Masoud, MD [address], S573/1    Discharge Date Discharge Disposition Discharge Destination                       Attending Provider:  Mustapha Ramires MD    Allergies:  No  "Known Allergies    Isolation:  None   Infection:  None   Code Status:  CPR    Ht:  182.9 cm (72.01\")   Wt:  125 kg (276 lb 6.4 oz)    Admission Cmt:  None   Principal Problem:  Hypotension [I95.9]                 Active Insurance as of 7/7/2020     Primary Coverage     Payor Plan Insurance Group Employer/Plan Group    [employer] MEDICAID [employer] MEDICAID [id number]     Payor Plan Address Payor Plan Phone Number Payor Plan Fax Number Effective Dates    [address] [address] [phone]  4/1/2018 - None Entered    [facility] [address]       Subscriber Name Subscriber Birth Date Member ID       DANIELLE MALLORY 1964 [id number]                 Emergency Contacts      (Rel.) Home Phone Work Phone Mobile Phone    MELISSA MALLORY (Daughter) -- -- [phone]    Alfnoso Mallory (Son) -- -- [phone]               Consult Notes (most recent note)      Joey Graham MD at 08/01/20 1102      Consult Orders    1. Inpatient Infectious Diseases Consult [[id number]] ordered by Ksenia Chaidez DO at 08/01/20 0801                INFECTIOUS DISEASE CONSULT/INITIAL HOSPITAL VISIT    Danielle Mallory  1964  [id number]    Date of consult: 8/1/2020    Admit date: 7/7/2020    Requesting Provider: Dr. Dianna Chaidez  Evaluating physician: Dr. Joey Graham  Reason for Consultation: Enterobacter sepsis with bacteremia, 4 out of 4 bottles positive from 7/31/2020  Chief Complaint: Intractable nausea/vomiting and constipation      Subjective   History of present illness:  Mr. Danielle Mallory 56 y.o.  Yr old male who is being evaluated for Enterobacter bacteremia.  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  He was initially admitted on 7/7/2020 with intractable nausea and vomiting x4 days.  He was initially admitted to the ICU and did require some pressors.  Hospital course was complicated by GI bleed and " hypotension as well acute kidney injury.  Patient was stabilized and transferred to the floor on 7/17.  Due to progressive kidney failure with fluid volume overload, tunneled dialysis catheter and central line was placed by interventional radiology on 7/24/2020 and dialysis was initiated.  There was reported issues with bleeding from the central line and patient received platelets and FFP.  Patient was found to have IJ line pulled most the way out had to be discontinued on 8/1 morning. Dialysis catheter remains in place.  Patient underwent a CT-guided paracentesis on 7/31, 7/15 and 7/10.  Patient had a EGD on 7/9 that showed mild portal gastropathy, reflux esophagitis and grade 1 esophageal varices.  He received a colonoscopy on 7/10 which showed a few sigmoid diverticula.  There is also petechiae in the rectum with scant oozing.  It was felt, in the absence of history of radiation to this region, that this is suspected portal colopathy.  Patient also received an echo on 7/8/2020 that was normal.    Patient did develop a fever of 101.5 on 7/30 as well as some tachycardia up to 112.  Patient did remain without leukocytosis however due to the fever blood cultures were ordered which have turned positive in 4/4 bottles for gram-negative bacilli identified as Enterobacteriaceae by bio fire report.  Currently WBC is 5.56, H&H is 8.1/25.7 and platelets are 34.  AST is elevated at 70 and alkaline phosphatase is elevated 148 and total bilirubin is elevated 2.7.  7/31 CT of chest show clear lungs and cirrhosis with ascites.  CT of the abdomen on 8/1 showed cirrhosis with splenomegaly and ascites, surgical absence of gallbladder and diverticulosis.    Patient has no known antibiotic allergies and is currently receiving IV Zosyn.  ID has been consulted for further evaluation and treatment as well as antimicrobial therapy management on 8/1/2020.    Of note:  7/8 blood cultures were finalized no growth in 4/4 bottles  7/31 body  fluid culture and stain-no organisms seen  7/15 body fluid culture and stain-no organisms seen    Past Medical History:   Diagnosis Date   • Arthritis    • Cancer (CMS/HCC)    • COPD (chronic obstructive pulmonary disease) (CMS/HCC)    • Fall 05/12/2020   • Hypertension    • Pancreatitis    • Tonsillar cancer (CMS/HCC)        Past Surgical History:   Procedure Laterality Date   • ANKLE TENDON REPAIR     • CARDIAC CATHETERIZATION N/A 5/31/2018    Procedure: Left Heart Cath;  Surgeon: Ray Farley MD;  Location:  [facility];  Service: Cardiovascular   • CHOLECYSTECTOMY     • COLONOSCOPY N/A 7/10/2020    Procedure: COLONOSCOPY;  Surgeon: Brunner, Mark I, MD;  Location:  [facility];  Service: Gastroenterology;  Laterality: N/A;   • CYST REMOVAL      coccyx   • ENDOSCOPY N/A 7/9/2020    Procedure: ESOPHAGOGASTRODUODENOSCOPY;  Surgeon: Brunner, Mark I, MD;  Location:  [facility];  Service: Gastroenterology;  Laterality: N/A;   • HERNIA MESH REMOVAL     • HERNIA REPAIR     • KNEE SURGERY  1981   • TONSILLECTOMY         Pediatric History   Patient Guardian Status   • Not on file     Other Topics Concern   • Not on file   Social History Narrative    Patient ambulatory without assistive device, but has ordered       family history includes Heart disease in his father; Hypertension in his mother.    No Known Allergies    Immunization History   Administered Date(s) Administered   • Hepatitis A 07/11/2020   • Hepatitis B Vaccine Adult IM 07/11/2020       Medication:    Current Facility-Administered Medications:   •  acetaminophen (TYLENOL) tablet 650 mg, 650 mg, Oral, Q4H PRN, Abram Abraham MD, 650 mg at 07/31/20 2107  •  albumin human 25 % IV SOLN 12.5 g, 12.5 g, Intravenous, PRN, Jose Alejandro Rose MD, 12.5 g at 07/24/20 1339  •  albuterol (PROVENTIL) nebulizer solution 0.083% 2.5 mg/3mL, 2.5 mg, Nebulization, Q6H PRN, Abram Abraham MD, 2.5 mg at 07/21/20 1405  •  aspirin chewable tablet 81 mg,  81 mg, Oral, Daily, Abram Abraham MD, 81 mg at 08/01/20 0802  •  bumetanide (BUMEX) tablet 4 mg, 4 mg, Oral, Daily, Escobar Miles MD, 4 mg at 08/01/20 1026  •  diphenhydrAMINE (BENADRYL) capsule 25 mg, 25 mg, Oral, Q6H PRN, Escobar Miles MD  •  heparin (porcine) injection 1,600 Units, 1,600 Units, Intracatheter, PRN, Escobar Miles MD, 1,600 Units at 07/31/20 1013  •  hydrocortisone 1 % cream, , Topical, Q12H, Rupa Hawthorne, APRN  •  lactulose (CHRONULAC) 10 GM/15ML solution 10 g, 10 g, Oral, TID, Abram Abraham MD, 10 g at 08/01/20 1736  •  magic mouthwash oral supsension 5 mL, 5 mL, Swish & Spit, 4x Daily, Loreta Hemphill II, DO, 5 mL at 08/01/20 1233  •  meropenem (MERREM) 1 g/100 mL 0.9% NS VTB (mbp), 1 g, Intravenous, Q12H, Maynor Mcfarland, DO  •  metoclopramide (REGLAN) tablet 5 mg, 5 mg, Oral, TID AC, Abram Abraham MD, 5 mg at 08/01/20 1737  •  midodrine (PROAMATINE) tablet 10 mg, 10 mg, Oral, 3 times per day on Sun Tue Thu Sat, Ksenia Chaidez DO, 10 mg at 08/01/20 1736  •  midodrine (PROAMATINE) tablet 20 mg, 20 mg, Oral, 3 times per day on Mon Wed Fri, Ksenia Chaidez DO, 20 mg at 07/31/20 1741  •  mirtazapine (REMERON) tablet 15 mg, 15 mg, Oral, Nightly, Abram Abraham MD, 15 mg at 07/31/20 1940  •  morphine injection 1 mg, 1 mg, Intravenous, Q4H PRN, Ksenia Chaidez DO, 1 mg at 08/01/20 0210  •  Sally patch 2 patch, 2 patch, Topical, Once, Zamzam Villa, APRN, Stopped at 07/31/20 2211  •  ondansetron (ZOFRAN) injection 4 mg, 4 mg, Intravenous, Q6H PRN, Abram Abraham MD, 4 mg at 07/31/20 0844  •  oxyCODONE (ROXICODONE) immediate release tablet 10 mg, 10 mg, Oral, Q6H PRN, Escobar Miles MD, 10 mg at 08/01/20 1846  •  pantoprazole (PROTONIX) EC tablet 40 mg, 40 mg, Oral, BID AC, Abram Abraham MD, 40 mg at 08/01/20 1737  •  phenylephrine-mineral oil-petrolatum (PREPARATION H) 0.25-14-74.9 % hemorhoidal ointment, , Rectal, TID PRN, Abram Abraham MD  •   "sertraline (ZOLOFT) tablet 25 mg, 25 mg, Oral, Daily, Abram Abraham MD, 25 mg at 08/01/20 0802  •  sodium chloride 0.9 % flush 10 mL, 10 mL, Intravenous, Q12H, Abram Abraham MD, 10 mL at 08/01/20 1026  •  sodium chloride 0.9 % flush 10 mL, 10 mL, Intravenous, PRN, Abram Abraham MD, 10 mL at 07/10/20 0759    Please refer to the medical record for a full medication list    Review of Systems:    Constitutional--positive for fever  HEENT-- No new vision, hearing or throat complaints.  No epistaxis or oral sores.  Denies odynophagia or dysphagia.  No odynophagia or dysphagia. No headache, photophobia or neck stiffness.  CV-- No chest pain, palpitation or syncope  Resp-- No SOB/cough/Hemoptysis  GI- No nausea, vomiting, or diarrhea.  No hematochezia, melena, or hematemesis. Denies jaundice or chronic liver disease.  -- No dysuria, hematuria, or flank pain.  Denies hesitancy, urgency or flank pain.  Lymph- no swollen lymph nodes in neck/axilla or groin.   Heme- No active bruising or bleeding; no Hx of DVT or PE.  MS-- no swelling or pain in the bones or joints of arms/legs.  No new back pain.  Neuro-- No acute focal weakness or numbness in the arms or legs.  No seizures.  Skin--positive for bruising    Physical Exam:   Vital Signs   Temp:  [97.6 °F (36.4 °C)-98.4 °F (36.9 °C)] 98.3 °F (36.8 °C)  Heart Rate:  [] 87  Resp:  [16] 16  BP: ()/(48-60) 95/59    Temp  Min: 97.6 °F (36.4 °C)  Max: 98.4 °F (36.9 °C)  BP  Min: 83/50  Max: 113/60  Pulse  Min: 83  Max: 108  Resp  Min: 16  Max: 16  SpO2  Min: 94 %  Max: 97 %    Blood pressure 95/59, pulse 87, temperature 98.3 °F (36.8 °C), temperature source Oral, resp. rate 16, height 182.9 cm (72\"), weight 125 kg (276 lb 6.4 oz), SpO2 94 %.  GENERAL: Awake and alert, in moderate. Appears older than stated age.  Resting in bed.  HEENT:  Normocephalic, atraumatic.  Oropharynx without thrush. Dentition in good repair. Ears externally normal, Nose externally " normal.  EYES: PERRL. No conjunctival injection. No icterus. EOM full.  LYMPHATICS: No lymphadenopathy of the neck or axillary regions.   HEART: Audible murmur noted. Reg rate rhythm, No JVD at 45 degrees.  LUNGS: Clear to auscultation and percussion. No respiratory distress, no use of accessory muscles.  ABDOMEN: Soft, tender x4 quadrants, nondistended. No appreciable HSM.  Bowel sounds normal.  GENITAL: Without Abbasi cath.   SKIN: Warm and dry without cutaneous eruptions.  Ecchymosis noted across bilateral chest and scattered across bilateral upper extremities  PSYCHIATRIC: Mental status lucid. No confusion.  EXT:  No cellulitic change.  NEURO: Oriented to name, CN 2 to 12 intact  LINES: Right chest Vipin cath in place.  Ecchymosis noted around the site.  Insertion site is not warm however it is tender to touch.  Central line insertion site at right IJ with dressing in place.  No erythema or tenderness noted at insertion site.  Central line was removed on the morning of 8/1.      Results Review:       Results from last 7 days   Lab Units 08/01/20  0355 07/31/20  0456 07/30/20  1224 07/30/20  0407   WBC 10*3/mm3 5.56 4.18  --  4.68   HEMOGLOBIN g/dL 8.1* 8.3* 7.6* 8.1*   HEMATOCRIT % 25.7* 25.4* 24.6* 24.5*   PLATELETS 10*3/mm3 34* 44*  --  58*     Results from last 7 days   Lab Units 08/01/20  0355   SODIUM mmol/L 138   POTASSIUM mmol/L 4.0   CHLORIDE mmol/L 104   CO2 mmol/L 23.0   BUN mg/dL 24*   CREATININE mg/dL 4.69*   GLUCOSE mg/dL 139*   CALCIUM mg/dL 8.6     Results from last 7 days   Lab Units 08/01/20  0355   ALK PHOS U/L 148*   BILIRUBIN mg/dL 2.7*   ALT (SGPT) U/L 37   AST (SGOT) U/L 70*                     Estimated Creatinine Clearance: 24 mL/min (A) (by C-G formula based on SCr of 4.69 mg/dL (H)).    Microbiology:  Microbiology Results (last 10 days)     Procedure Component Value - Date/Time    Body Fluid Culture - Body Fluid, Peritoneum [[id number]] Collected:  07/31/20 1422    Lab Status:  Preliminary  result Specimen:  Body Fluid from Peritoneum Updated:  08/01/20 0924     Body Fluid Culture No growth     Gram Stain No organisms seen      Occasional WBCs per low power field    Blood Culture - Blood, Arm, Left [[id number]]  (Abnormal) Collected:  07/31/20 0832    Lab Status:  Preliminary result Specimen:  Blood from Arm, Left Updated:  08/01/20 1242     Blood Culture Abnormal Stain     Gram Stain Anaerobic Bottle Gram negative bacilli      Aerobic Bottle Gram negative bacilli    Blood Culture - Blood, Blood, PICC Line [[id number]]  (Abnormal) Collected:  07/31/20 0830    Lab Status:  Preliminary result Specimen:  Blood, PICC Line Updated:  08/01/20 1244     Blood Culture Abnormal Stain     Gram Stain Anaerobic Bottle Gram negative bacilli      Aerobic Bottle Gram negative bacilli    Blood Culture ID, PCR - Blood, Blood, PICC Line [[id number]]  (Abnormal) Collected:  07/31/20 0830    Lab Status:  Final result Specimen:  Blood, PICC Line Updated:  08/01/20 0022     BCID, PCR Enterobacteriaceae Group. Identification by BCID PCR.            Radiology:  Imaging Results (Last 72 Hours)     Procedure Component Value Units Date/Time    CT Abdomen Pelvis Without Contrast [[id number]] Collected:  08/01/20 0002     Updated:  08/01/20 0005    Narrative:       CT Abdomen Pelvis WO    INDICATION:   Bacteremia and hypotension. Status post paracentesis today.    TECHNIQUE:   CT of the abdomen and pelvis without IV contrast. Coronal and sagittal reconstructions were obtained.  Radiation dose reduction techniques included automated exposure control or exposure modulation based on body size. Count of known CT and cardiac nuc  med studies performed in previous 12 months: 6.     COMPARISON:   7/7/2020    FINDINGS:  Abdomen: Normal caliber aorta. The spleen is enlarged. The liver is cirrhotic and there is a small volume of ascites in the abdomen and pelvis. Negative adrenal glands. Atrophic pancreas and surgical absence of the gallbladder.  No focal hepatic mass  although study sensitivity is limited. Nonobstructed kidneys. No adenopathy.    Pelvis: Negative bladder and prostate gland. Diverticulosis. Appendix not clearly demonstrated but no distinct evidence of acute appendicitis. Fat-containing left inguinal hernia. No suspicious bone lesion. Chronic antegrade listhesis of L4 on L5 grade  1/2.      Impression:         1. Cirrhosis with splenomegaly and ascites.  2. Surgical absence of the gallbladder.  3. Diverticulosis.          Signer Name: Clark Smyth MD   Signed: 8/1/2020 12:02 AM   Workstation Name: [facility]    Radiology [facility]    CT Chest Without Contrast [[id number]] Collected:  07/31/20 2359     Updated:  08/01/20 0001    Narrative:       CT Chest WO    INDICATION:   Bacteremia. Hypertension. Status post paracentesis today.    TECHNIQUE:   CT of the thorax without IV contrast. Coronal and sagittal reconstructions were obtained.  Radiation dose reduction techniques included automated exposure control or exposure modulation based on body size. Count of known CT and cardiac nuc med studies  performed in previous 12 months: 6.     COMPARISON:   7/7/2020    FINDINGS:  Lungs are essentially clear. Old healed granulomatous disease no pleural effusion. No pericardial effusion. There is an enlarged anterior pericardial lymph node measuring up to 15 mm. Included upper abdomen demonstrates cirrhosis and ascites with  probable splenomegaly. The abdomen CT has been dictated separately. No suspicious bone lesion.      Impression:         1. The lungs are clear. Mildly enlarged anterior pericardial lymph node, indeterminate.  2. Cirrhosis with ascites.    Signer Name: Clark Smyth MD   Signed: 7/31/2020 11:59 PM   Workstation Name: [facility]    Radiology [facility]    CT Guided Paracentesis [[id number]] Collected:  07/31/20 2149     Updated:  07/31/20 2154    Narrative:       PROCEDURE: CT-guided paracentesis      Procedural Personnel  Attending physician(s): ABELARDO Gore M.D.  Fellow physician(s): None  Resident physician(s): None  Advanced practice provider(s): None     Pre-procedure diagnosis: Liver cirrhosis; recurrent large volume  symptomatic ascites?  Post-procedure diagnosis: Same  Indication: Diagnostic/therapeutic  Additional clinical history: None     Complications: No immediate complications.       Impression:          CT-guided [place] of [address] mL of serous fluid.  Portion sent for requested laboratory analysis     Plan:      Resume care by clinical team.  _______________________________________________________________     PROCEDURE SUMMARY:  - Limited CT  - CT-guided paracentesis  - Additional procedure(s): None     PROCEDURE DETAILS:     Pre-procedure  Consent: Informed consent for the procedure including risks, benefits  and alternatives was obtained and time-out was performed prior to the  procedure.  Preparation: The site was prepared and draped using maximal sterile  barrier technique including cutaneous antisepsis.     Anesthesia/sedation  Level of anesthesia/sedation: No sedation     Initial abdominal CT  Initial abdominal CT was performed.  Findings: Large volume ascites. A safe window for paracentesis was  identified.     Paracentesis  Local anesthesia was administered. The peritoneal cavity was accessed  and needle position confirmed by CT. Ascites was drained. The catheter  was then removed, and a sterile bandage was applied.  Paracentesis access technique: Trocar  Catheter placed: 8.5 [place]  Post-drainage CT: Near resolution of ascites.     Radiation Dose  CT dose length product (mGy-cm): 865      Additional Details  Additional description of procedure: None  Equipment details: None  Specimens removed: Abdominal fluid  Estimated blood loss (mL): Less than 10  Standardized report: Paracentesis     Attestation  I was present and scrubbed for the entire procedure. Imaging  reviewed.  Agree with final report as written.     This report was finalized on 7/31/2020 9:51 PM by Rojas Gore.       XR Chest 1 View [[id number]] Collected:  07/31/20 2035     Updated:  07/31/20 2037    Narrative:       CR Chest 1 Vw    INDICATION:   IJ placement     COMPARISON:    Chest x-ray 5/13/2020.    FINDINGS:  Single portable AP view(s) of the chest.    There are 2 central venous catheters from a right IJ approach. Small caliber central venous catheter terminates distal SVC. The large caliber central venous catheter terminates distal SVC right atrial junction level. There is no pneumothorax. Both are  new.    Cardiac silhouette is normal in size. There is borderline central vascular congestion.. Please correlate for evidence for mild volume overload. There is no pleural effusion. There is no acute infiltrate.       Impression:         1. No pneumothorax.  2. Right IJ approach central venous catheter terminates distal SVC level. Second right IJ approach large caliber introducer type catheter terminates distal SVC/right atrial junction level.  3. Suspect borderline vascular congestion    Signer Name: Caitlin Talavera MD   Signed: 7/31/2020 8:35 PM   Workstation Name: [facility]    [facility]          IMPRESSION:     1. Sepsis with Enterobacteriaceae group bacteremia-blood cultures positive in 4/4 bottles 7/31/2020.  Etiology is unclear at this time but could likely be a dialysis/line infection. Prior echo negative on 7/8.  May possibly be an occult intra-abdominal process, or elsewhere but not obvious on CT scan of the chest abdomen and pelvis from 7/31/2020.  However due to the high-grade bacteremia and intravascular infection is suspected/likely.  2. Acute blood loss anemia secondary to GI bleed with hematemesis and rectal bleeding.  3. Liver cirrhosis with ascites status post multiple CT-guided paracentesis.  4. 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  5. Acute kidney injury  likely secondary to ATN however could also be hepatorenal syndrome.  6. Intractable nausea and vomiting.  7. Thrombocytopenia- could be secondary to chemotherapy or hepatic disease.  8. Elevated bilirubin- 2.7, likely secondary to hepatic disease, but needs evaluation for biliary tract disease.  9. Elevated AST-70.  10. Elevated alkaline phosphatase 148.      RECOMMENDATIONS:    1. Diagnostically continue to follow patient's physical exam, follow labs include CBC, CMP, CRP.  I will continue to follow blood culture sensitivity reports and adjust antibiotics accordingly.  Also obtain a hepatitis panel, HIV screen and an ultrasound the right upper quadrant to rule out biliary tract disease.  Will also need repeat a TTE to rule out endocarditis.  May consider a GABRIELA.  2. Therapeutically, discontinue IV Zosyn and start Merrem pharm to dose.  He will likely need to have the dialysis catheter removed and a new one placed on the opposite side of his chest.  This will be difficult due to patient's anemia and thrombocytopenia.  Duration of therapy will likely be 2-4 weeks from removal of dialysis catheter date.  Would recommend removing line as soon as possible and placing temporary dialysis catheter, while we clear his sepsis.  3. Continue supportive care.    Thank you for asking me to see Oliver Mallory.  Our group would be pleased to follow this patient over the course of their hospitalization and assist with outpatient antimicrobial therapy, as indicated.  Further recommendations depend on the results of the cultures and clinical course.  Side effects of medications were discussed.  Patient is at increased risk for adverse drug reactions, recurrent infection, readmission.    Joey Graham MD  8/1/2020      Electronically signed by Joey Graahm MD at 08/01/20 1923        No

## 2024-07-04 NOTE — ED ADULT NURSE NOTE - NSFALLHARMRISKINTERV_ED_ALL_ED
Assistance OOB with selected safe patient handling equipment if applicable/Assistance with ambulation/Communicate risk of Fall with Harm to all staff, patient, and family/Monitor gait and stability/Monitor for mental status changes and reorient to person, place, and time, as needed/Move patient closer to nursing station/within visual sight of ED staff/Provide patient with walking aids/Provide visual cue: red socks, yellow wristband, yellow gown, etc/Reinforce activity limits and safety measures with patient and family/Toileting schedule using arm’s reach rule for commode and bathroom/Use of alarms - bed, stretcher, chair and/or video monitoring/Bed in lowest position, wheels locked, appropriate side rails in place/Call bell, personal items and telephone in reach/Instruct patient to call for assistance before getting out of bed/chair/stretcher/Non-slip footwear applied when patient is off stretcher/[place] to call system/Physically safe environment - no spills, clutter or unnecessary equipment/Purposeful Proactive Rounding/Room/bathroom lighting operational, light cord in reach

## 2024-07-04 NOTE — H&P ADULT - ASSESSMENT
Ms Garcia is an 88-year-old female, former smoker  with COPD, dementia, sent in from the [facility] facility because of lethargy, generalized weakness for past few days.  Pt is not offering any complaints, she does not know why she is in the hospital.  She has dementia and she does not remember what she even ate today, she does not know if she walked today.  She denies chest pain, cough, nausea, vomiting, abdominal pain, dysuria.   In the ED, she had a temp of 100.4.  EKG showed NSR 84/min.  Cxray is unremarkable, unchanged from prior  Urinalysis is + for blood +ve for leuk est, neg for nitrite.  She was given a dose of Zosyn and Vanco in the ED.     Lethargy, low grade temp in pt with dementia  Possible UTI  COPD is stable and not in acute exacerbation    Will place in observation  Empiric Ceftriaxone pending cultures  Continue current meds: Aspirin, Folic acid, Seroquel, thiamine, cholecalciferol  Continue LABA/inhaled steroid-Symbicort (pt is on Breo as outpt)  Duoneb prn  Will get leg dopplers since pt is sedentary - to r/o DVT  DVT prophylaxis  GOC d/w pt's son, Estevan Garcia, he is HCP and states that pt is a DNR/DNI  I  Ms Garcia is an 88-year-old female, former smoker  with COPD, dementia, sent in from the [facility] facility because of lethargy, generalized weakness for past few days.  Pt is not offering any complaints, she does not know why she is in the hospital.  She has dementia and she does not remember what she even ate today, she does not know if she walked today.  She denies chest pain, cough, nausea, vomiting, abdominal pain, dysuria.   In the ED, she had a temp of 100.4.  EKG showed NSR 84/min.  Cxray is unremarkable, unchanged from prior  Urinalysis is + for blood +ve for leuk est, neg for nitrite.  She was given a dose of Zosyn and Vanco in the ED.     Lethargy, low grade temp in pt with dementia  Possible UTI  COPD is stable and not in acute exacerbation    Will place in observation  Empiric Ceftriaxone pending cultures  Continue current meds: Aspirin, Folic acid, Seroquel, thiamine, cholecalciferol  Continue LABA/inhaled steroid-Symbicort (pt is on Breo as outpt)  Duoneb prn  Will get leg dopplers since pt is sedentary - to r/o DVT  DVT prophylaxis  GOC d/w pt's son, Estevan Garcia, he is HCP and states that pt is a DNR/DNI  I did call the [facility] for DNR paper work and this was not vailable  I called Mr Garcia again and left a message so that the MOLST forms could be filled out

## 2024-07-04 NOTE — H&P ADULT - HISTORY OF PRESENT ILLNESS
Ms Garcia is an 88-year-old female, former smoker  with COPD, dementia, sent in from the [facility] facility because of lethargy, generalized weakness for past few days.  Pt is not offering any complaints, she does not know why she is in the hospital.  She has dementia and she does not remember what she even ate today, she does not know if she walked today.  She denies chest pain, cough, nausea, vomiting, abdominal pain, dysuria.   In the ED, she had a temp of 100.4.  EKG showed NSR 84/min.  Cxray is unremarkable, unchanged from prior  Urinalysis is + for blood +ve for leuk est, neg for nitrite.  She was given a dose of Zosyn and Vanco in the ED.

## 2024-07-04 NOTE — ED ADULT NURSE NOTE - OBJECTIVE STATEMENT
Pt BIB EMS from [facility] for lethargy starting this morning. Pt with hx dementia. No fevers. Pt denies pain or discomfort. Per staff at facility, pt noted to have a cough for a few weeks. FS= 122 by EMS. On arrival, pt with a rectal temp= 100.4 F.

## 2024-07-04 NOTE — H&P ADULT - RESPIRATORY
occ rhonchi/no respiratory distress/no use of accessory muscles/airway patent/breath sounds equal/good air movement

## 2024-07-04 NOTE — ED ADULT NURSE NOTE - NS ED NURSE PATIENT LEFT UNIT TIME
Progress Note -Surgery PA  Janet Peterson 55 y o  female MRN: [id number]  Unit/Bed#: -01 Encounter: [id number]      Assessment:  55year old female with diverticulitis with abscess  Plan:  1  Bump in white count-CT most likely to be ordered  2  Continue Levaquin/flagyl  3  Ambulate  4  Pain control  5  Continue to monitor drain output/flushes  6  cdiff negative         Subjective/Objective     Subjective: Patient states she feels much better today  Pain has essentially resolved  Wants to go home  Drain output 185 in 24hs  Objective:     /58   Pulse 102   Temp 99 4 °F (37 4 °C)   Resp 18   Ht 5' 6" (1 676 m)   Wt 107 kg (236 lb 15 9 oz)   SpO2 95%   BMI 38 25 kg/m²   I/O last 24 hours: In: 1518 8 [P O :600;  I V :918 8]  Out: 1135 [Urine:950; Drains:185]        Intake/Output Summary (Last 24 hours) at 01/07/18 0806  Last data filed at 01/07/18 0600   Gross per 24 hour   Intake              600 ml   Output             1075 ml   Net             -475 ml       Invasive Devices     Peripheral Intravenous Line            Peripheral IV 01/03/18 Left Antecubital 3 days          Drain            Closed/Suction Drain Left Abdomen Bulb 8 5 Fr  2 days                Physical Exam:  /58   Pulse 102   Temp 99 4 °F (37 4 °C)   Resp 18   Ht 5' 6" (1 676 m)   Wt 107 kg (236 lb 15 9 oz)   SpO2 95%   BMI 38 25 kg/m²   General appearance: alert and oriented, in no acute distress and alert  Lungs: clear to auscultation bilaterally  Heart: regular rate and rhythm, S1, S2 normal, no murmur, click, rub or gallop  Abdomen: soft, non-tender; bowel sounds normal; no masses,  no organomegaly      Current Facility-Administered Medications:     heparin (porcine) subcutaneous injection 5,000 Units, 5,000 Units, Subcutaneous, Q8H [address], 5,000 Units at 01/07/18 0505 **AND** Platelet count, , , Once, Lonosvaldo Hawkins PA-C    HYDROmorphone (DILAUDID) 1 mg/mL injection 1 mg, 1 mg, Intravenous, Q2H PRN, Laine Manley DO, 0 5 mg at 01/07/18 0545    influenza inactivated quadrivalent vaccine (FLULAVAL) IM injection 0 5 mL, 0 5 mL, Intramuscular, Prior to discharge, Walt Gardiner DO    ketorolac (TORADOL) injection 15 mg, 15 mg, Intravenous, Q6H PRN, Derian Cyr PA-C, 15 mg at 01/07/18 0213    ketorolac (TORADOL) injection 30 mg, 30 mg, Intravenous, Q6H PRN, Walt Gardiner DO    levofloxacin (LEVAQUIN) IVPB (premix) 750 mg, 750 mg, Intravenous, Q24H, Walt Gardiner DO, Last Rate: 100 mL/hr at 01/06/18 1122, 750 mg at 01/06/18 1122    losartan (COZAAR) tablet 100 mg, 100 mg, Oral, Daily, Bridgett Aguero PA-C, 100 mg at 01/06/18 0756    metoclopramide (REGLAN) injection 10 mg, 10 mg, Intravenous, Q6H PRN, Carolina Avilez PA-C, 10 mg at 01/06/18 0756    metoprolol (LOPRESSOR) injection 5 mg, 5 mg, Intravenous, Q6H PRN, Bridgett Aguero PA-C    metroNIDAZOLE (FLAGYL) IVPB (premix) 500 mg, 500 mg, Intravenous, Q8H, Bridgett Aguero PA-C, Last Rate: 200 mL/hr at 01/07/18 0504, 500 mg at 01/07/18 0504    nicotine (NICODERM CQ) 21 mg/24 hr TD 24 hr patch 1 patch, 1 patch, Transdermal, Daily, Bridgett Aguero PA-C, Stopped at 01/06/18 0900    ondansetron (ZOFRAN) injection 4 mg, 4 mg, Intravenous, Q4H PRN, Karen Avilez PA-C, 4 mg at 01/06/18 1654    sodium chloride 0 9 % infusion, 125 mL/hr, Intravenous, Continuous, Bridgett Aguero PA-C, Last Rate: 125 mL/hr at 01/07/18 0337, 125 mL/hr at 01/07/18 0337           Lab, Imaging and other studies:  CBC:   Lab Results   Component Value Date    WBC 13 85 (H) 01/07/2018    HGB 12 3 01/07/2018    HCT 39 1 01/07/2018    MCV 87 01/07/2018     01/07/2018    MCH 27 5 01/07/2018    MCHC 31 5 01/07/2018    RDW 13 9 01/07/2018    MPV 10 1 01/07/2018   , CMP:   Lab Results   Component Value Date     01/07/2018    K 3 2 (L) 01/07/2018     01/07/2018    CO2 19 (L) 01/07/2018    ANIONGAP 10 01/07/2018    BUN 7 01/07/2018    CREATININE 0 88 01/07/2018    GLUCOSE 133 01/07/2018    CALCIUM 8 1 (L) 01/07/2018    EGFR 79 01/07/2018         VTE Pharmacologic Prophylaxis: Sequential compression device (Venodyne)  and Heparin  VTE Mechanical Prophylaxis: sequential compression device    Rounds performed with nursing  23:23

## 2024-07-04 NOTE — ED PROVIDER NOTE - CLINICAL SUMMARY MEDICAL DECISION MAKING FREE TEXT BOX
88-year-old female with a past medical history of COPD, dementia, nicotine dependence, presenting with generalized weakness for the past few days.  Of note she is coming from [facility]  assisted living facility.  Per EMS report the patient has been having a mild cough for the past few days and has been more fatigued than normal.  She is unable to give us a history due to her dementia. Denies any chest pain, abdominal pain, shortness of breath, nausea/vomiting, headaches,   diarrhea   syncope, hematuria, dysuria, urinary symptoms, subjective neurological deficits.     The patient meets SIRS criteria with (+)  Temp > 100.4F or < 96.8F,  HR > 90 BPM,  RR > 20 or PaCO2 <32 mmHg,  WBC >12k or <4k, or >10% bands. Suspected source of infection is: uti vs pna vs urti; Patient is currently hemodynamically stable, normotensive, mentating well at her baseline.   - Two large-bore IV's, 30ml/kg lactated ringers fluid resuscitation,   - CBC, CMP, PT/PTT/INR, Troponin, BNP, UA/UCx, 2 x BCx,  lactate + COVID-19 rule out.   - CXR    - Unknown Source: Vancomycin 1g IVPB + zosyn  - Re-assessment for fluid responsiveness then admit

## 2024-07-04 NOTE — ED PROVIDER NOTE - PHYSICAL EXAMINATION
VITAL SIGNS: I have reviewed nursing notes and confirm.   GEN: Well-developed; well-nourished; in no acute distress. Speaking full sentences.  SKIN: Warm, pink, no rash, no diaphoresis, no cyanosis, well perfused.   HEAD: Normocephalic; atraumatic. No scalp lacerations, no abrasions.  NECK: Supple; non tender. (+) non-meningitic   EYES: Pupils 3mm equal, round, reactive to light and accomodation, conjunctiva and sclera clear   ENT: No nasal discharge; airway clear. Trachea is midline. Normal dentition.  CV: RRR. S1, S2 normal; no murmurs, gallops, or rubs. Capillary refill < 2 seconds throughout. Distal pulses intact 2+ throughout.  RESP: CTA bilaterally. No wheezes, rales, or rhonchi.   ABD: Normal bowel sounds, soft, non-distended, non-tender, no rebound, no guarding, no rigidity   MSK: Normal range of motion and movement of all 4 extremities. No apparent joint or muscular pain throughout.    BACK: No thoracolumbar midline or paravertebral tenderness. No step-offs or obvious deformities.  NEURO: Alert & oriented x  name, Normal speech and coordination.

## 2024-07-04 NOTE — ED ADULT TRIAGE NOTE - CHIEF COMPLAINT QUOTE
Pt BIB EMS from [facility] for lethargy starting this morning. Pt with hx dementia. No fevers. Pt denies pain or discomfort. Per staff at facility, pt noted to have a cough for a few weeks. FS= 122 by EMS.

## 2024-07-04 NOTE — H&P ADULT - NSHPPHYSICALEXAM_GEN_ALL_CORE
Vital Signs (24 Hrs):  T(C): 38 (07-04-24 @ 13:35), Max: 38 (07-04-24 @ 13:35)  HR: 73 (07-04-24 @ 16:08) (73 - 90)  BP: 112/69 (07-04-24 @ 16:08) (112/69 - 132/78)  RR: 18 (07-04-24 @ 16:08) (18 - 18)  SpO2: 97% (07-04-24 @ 16:08) (95% - 97%)  Wt(kg): --  Daily Height in cm: 160.02 (04 Jul 2024 12:54)

## 2024-07-05 ENCOUNTER — TRANSCRIPTION ENCOUNTER (OUTPATIENT)
Age: 89
End: 2024-07-05

## 2024-07-05 LAB
ALBUMIN SERPL ELPH-MCNC: 2.6 G/DL — LOW (ref 3.3–5)
ALP SERPL-CCNC: 113 U/L — SIGNIFICANT CHANGE UP (ref 40–120)
ALT FLD-CCNC: 12 U/L — SIGNIFICANT CHANGE UP (ref 10–45)
ANION GAP SERPL CALC-SCNC: 7 MMOL/L — SIGNIFICANT CHANGE UP (ref 5–17)
AST SERPL-CCNC: 16 U/L — SIGNIFICANT CHANGE UP (ref 10–40)
BILIRUB SERPL-MCNC: 0.2 MG/DL — SIGNIFICANT CHANGE UP (ref 0.2–1.2)
BUN SERPL-MCNC: 17 MG/DL — SIGNIFICANT CHANGE UP (ref 7–23)
CALCIUM SERPL-MCNC: 9 MG/DL — SIGNIFICANT CHANGE UP (ref 8.4–10.5)
CHLORIDE SERPL-SCNC: 107 MMOL/L — SIGNIFICANT CHANGE UP (ref 96–108)
CO2 SERPL-SCNC: 27 MMOL/L — SIGNIFICANT CHANGE UP (ref 22–31)
CREAT SERPL-MCNC: 0.76 MG/DL — SIGNIFICANT CHANGE UP (ref 0.5–1.3)
EGFR: 75 ML/MIN/1.73M2 — SIGNIFICANT CHANGE UP
GLUCOSE SERPL-MCNC: 89 MG/DL — SIGNIFICANT CHANGE UP (ref 70–99)
HCT VFR BLD CALC: 38.5 % — SIGNIFICANT CHANGE UP (ref 34.5–45)
HGB BLD-MCNC: 12.3 G/DL — SIGNIFICANT CHANGE UP (ref 11.5–15.5)
MCHC RBC-ENTMCNC: 29.9 PG — SIGNIFICANT CHANGE UP (ref 27–34)
MCHC RBC-ENTMCNC: 31.9 GM/DL — LOW (ref 32–36)
MCV RBC AUTO: 93.7 FL — SIGNIFICANT CHANGE UP (ref 80–100)
NRBC # BLD: 0 /100 WBCS — SIGNIFICANT CHANGE UP (ref 0–0)
PLATELET # BLD AUTO: 368 K/UL — SIGNIFICANT CHANGE UP (ref 150–400)
POTASSIUM SERPL-MCNC: 4.2 MMOL/L — SIGNIFICANT CHANGE UP (ref 3.5–5.3)
POTASSIUM SERPL-SCNC: 4.2 MMOL/L — SIGNIFICANT CHANGE UP (ref 3.5–5.3)
PROT SERPL-MCNC: 6.5 G/DL — SIGNIFICANT CHANGE UP (ref 6–8.3)
RBC # BLD: 4.11 M/UL — SIGNIFICANT CHANGE UP (ref 3.8–5.2)
RBC # FLD: 15.4 % — HIGH (ref 10.3–14.5)
SODIUM SERPL-SCNC: 141 MMOL/L — SIGNIFICANT CHANGE UP (ref 135–145)
WBC # BLD: 8.42 K/UL — SIGNIFICANT CHANGE UP (ref 3.8–10.5)
WBC # FLD AUTO: 8.42 K/UL — SIGNIFICANT CHANGE UP (ref 3.8–10.5)

## 2024-07-05 PROCEDURE — 93970 EXTREMITY STUDY: CPT | Mod: 26

## 2024-07-05 RX ORDER — CEFPODOXIME PROXETIL 50 MG/5 ML
200 SUSPENSION, RECONSTITUTED, ORAL (ML) ORAL EVERY 12 HOURS
Refills: 0 | Status: DISCONTINUED | OUTPATIENT
Start: 2024-07-05 | End: 2024-07-07

## 2024-07-05 RX ADMIN — Medication 1 MILLIGRAM(S): at 12:16

## 2024-07-05 RX ADMIN — Medication 1 TABLET(S): at 12:16

## 2024-07-05 RX ADMIN — Medication 2000 UNIT(S): at 12:16

## 2024-07-05 RX ADMIN — Medication 12.5 MILLIGRAM(S): at 22:02

## 2024-07-05 RX ADMIN — Medication 200 MILLIGRAM(S): at 18:42

## 2024-07-05 RX ADMIN — ENOXAPARIN SODIUM 40 MILLIGRAM(S): 100 INJECTION SUBCUTANEOUS at 05:54

## 2024-07-05 RX ADMIN — ASPIRIN 81 MILLIGRAM(S): 325 TABLET, FILM COATED ORAL at 12:16

## 2024-07-05 RX ADMIN — Medication 100 MILLIGRAM(S): at 05:53

## 2024-07-05 RX ADMIN — Medication 100 MILLIGRAM(S): at 12:16

## 2024-07-05 RX ADMIN — Medication 2 PUFF(S): at 08:58

## 2024-07-05 NOTE — PROGRESS NOTE ADULT - ASSESSMENT
Ms Garcia is an 88-year-old female, former smoker  with COPD, dementia, sent in from the [facility] facility because of lethargy, generalized weakness for past few days.     She has dementia and she does not remember what she even ate today, she does not know if she walked today.  She denies chest pain, cough, nausea, vomiting, abdominal pain, dysuria.   In the ED, she had a temp of 100.4.  EKG showed NSR 84/min.  Cxray is unremarkable, unchanged from prior  Urinalysis is + for blood +ve for leuk est, neg for nitrite.  She was given a dose of Zosyn and Vanco in the ED.     Lethargy, low grade temp in pt with dementia  Possible UTI  COPD is stable and not in acute exacerbation    Will place in observation  Empiric Ceftriaxone pending cultures  Continue current meds: Aspirin, Folic acid, Seroquel, thiamine, cholecalciferol  Continue LABA/inhaled steroid-Symbicort (pt is on Breo as outpt)  Duoneb prn  Will get leg dopplers since pt is sedentary - to r/o DVT  DVT prophylaxis  GOC d/w pt's son, Estevan Garcia, he is HCP and states that pt is a DNR/DNI  I did call the [facility] for DNR paper work and this was not vailable  I called Mr Garcia again and left a message so that the MOLST forms could be filled out     Ms Garcia is an 88-year-old female, former smoker  with COPD, dementia,   sent in from the [facility] facility because of lethargy, generalized weakness for past few days.  In the ED, she had a temp of 100.4.  Urinalysis is + for blood +ve for leuk est, neg for nitrite.  She was given a dose of Zosyn and Vanco in the ED.     Possible UTI  COPD is stable and not in acute exacerbation    Empiric Ceftriaxone pending cultures  Continue current meds: Aspirin, Folic acid, Seroquel, thiamine, cholecalciferol  Continue LABA/inhaled steroid-Symbicort (pt is on Breo as outpt)  Duoneb prn  dopplers since pt is sedentary - to r/o DVT, pending  DVT prophylaxis    GOC discussed by admitting MD with pt's son, Estevan Garcia, he is HCP,  pt is a DNR/DNI  Mr. Garcia again and left a message so that the MOLST forms could be filled out    new order for LABS in AM

## 2024-07-05 NOTE — DISCHARGE NOTE NURSING/CASE MANAGEMENT/SOCIAL WORK - NSDCFUADDAPPT_GEN_ALL_CORE_FT
Resources to the following clinics for Outpatient Mental Health Care: [facility] [place] [phone],  [facility] [phone], [facility] and Adult Outpatient Psych at [facility]. Please reach out to set up appointment with the location of your choosing.

## 2024-07-05 NOTE — DISCHARGE NOTE NURSING/CASE MANAGEMENT/SOCIAL WORK - PATIENT PORTAL LINK FT
You can access the FollowMyHealth Patient Portal offered by  by registering at the following website: [email or web address]. By joining [email or web address] FollowMyHealth portal, you will also be able to view your health information using other applications (apps) compatible with our system.

## 2024-07-05 NOTE — PATIENT PROFILE ADULT - FALL HARM RISK - HARM RISK INTERVENTIONS

## 2024-07-05 NOTE — DISCHARGE NOTE NURSING/CASE MANAGEMENT/SOCIAL WORK - NSDCPEFALRISK_GEN_ALL_CORE
For information on Fall & Injury Prevention, visit: [email or web address] OR  [email or web address] OR  https://www.cdc.gov/steadi/patient.html

## 2024-07-05 NOTE — PATIENT PROFILE ADULT - NSTRANSFERBELONGINGSDISPO_GEN_A_NUR
yellow metal ring yellow metal cross with black string white metal ring pants shirt socks black sneakers/with patient

## 2024-07-05 NOTE — PROGRESS NOTE ADULT - SUBJECTIVE AND OBJECTIVE BOX
Chart reviewed  Case discussed with nursing staff    CHIEF COMPLAINT:    SUBJECTIVE:        REVIEW OF SYSTEMS:  no vomiting, no fevers, no respiratory distress      PHYSICAL EXAM  AFVSS  Constitutional:        NAD, lying n bed  HEENT:                     PERR, EOMI, Neck: Soft and supple, No LAD, No JVD  Respiratory:             Breath sounds are clear bilaterally, No wheezing, rales or rhonchi  Cardiovascular:       S1 and S2, regular rate and rhythm, no Murmurs, gallops or rubs  Gastrointestinal:     Bowel Sounds present, soft, nontender, nondistended, no guarding, no rebound  Extremities:             No peripheral edema  Vascular:                  2+ peripheral pulses  Neurological:           no focal motor deficits, exam limited, moving all extremities  Musculoskeletal:    strength unable to assess today  Skin:                          No rashes    MEDICATIONS:  Reviewed, compared and reconciled    LABS:    DIAGNOSTICS:     Chart reviewed  Case discussed with nursing staff    CHIEF COMPLAINT:  Patient without major overnight events    SUBJECTIVE:        REVIEW OF SYSTEMS:  no vomiting, no fevers, no respiratory distress      PHYSICAL EXAM  AFVSS  Constitutional:        NAD, lying n bed  HEENT:                     PERR, EOMI, Neck: Soft and supple, No LAD, No JVD  Respiratory:             Breath sounds are clear bilaterally, No wheezing, rales or rhonchi  Cardiovascular:       S1 and S2, regular rate and rhythm, no Murmurs, gallops or rubs  Gastrointestinal:     Bowel Sounds present, soft, nontender, nondistended, no guarding, no rebound  Extremities:             No peripheral edema  Vascular:                  2+ peripheral pulses  Neurological:           no focal motor deficits, exam limited, moving all extremities  Musculoskeletal:    strength unable to assess today  Skin:                          No rashes    MEDICATIONS:  Reviewed, compared and reconciled    LABS:    DIAGNOSTICS:     Chart reviewed  Case discussed with nursing staff    CHIEF COMPLAINT:  Patient without major overnight events    SUBJECTIVE:  poor historian, still with malaise    REVIEW OF SYSTEMS:  no vomiting, no fevers, no respiratory distress      PHYSICAL EXAM  AFVSS  Constitutional:        NAD, lying n bed  HEENT:                     PERR, EOMI, Neck: Soft and supple, No LAD, No JVD  Respiratory:             Breath sounds are clear bilaterally, No wheezing, rales or rhonchi  Cardiovascular:       S1 and S2, regular rate and rhythm, no Murmurs, gallops or rubs  Gastrointestinal:     Bowel Sounds present, soft, nontender, nondistended, no guarding, no rebound  Extremities:             No peripheral edema  Vascular:                  2+ peripheral pulses  Neurological:           no focal motor deficits, exam limited, moving all extremities  Musculoskeletal:    strength unable to assess today  Skin:                          No rashes    MEDICATIONS:  Reviewed, compared and reconciled    LABS:                        12.6   10.89 )-----------( 378      ( 2024 13:35 )             39.0     07-04    142  |  106  |  21  ----------------------------<  99  4.7   |  31  |  0.80    Ca    9.6      2024 13:35    TPro  7.0  /  Alb  2.8<L>  /  TBili  0.3  /  DBili  x   /  AST  16  /  ALT  14  /  AlkPhos  123<H>  07-04    PT/INR - ( 2024 13:35 )   PT: 11.1 sec;   INR: 0.95 ratio         PTT - ( 2024 13:35 )  PTT:28.0 sec  Urinalysis Basic - ( 2024 15:40 )    Color: Yellow / Appearance: Clear / S.016 / pH: x  Gluc: x / Ketone: Negative mg/dL  / Bili: Negative / Urobili: 0.2 mg/dL   Blood: x / Protein: Negative mg/dL / Nitrite: Negative   Leuk Esterase: Large / RBC: 3 /HPF / WBC 2 /HPF   Sq Epi: x / Non Sq Epi: x / Bacteria: Negative /HPF      DIAGNOSTICS:  Diagnostic images personally reviewed

## 2024-07-06 DIAGNOSIS — N39.0 URINARY TRACT INFECTION, SITE NOT SPECIFIED: ICD-10-CM

## 2024-07-06 LAB
CULTURE RESULTS: SIGNIFICANT CHANGE UP
SPECIMEN SOURCE: SIGNIFICANT CHANGE UP

## 2024-07-06 RX ADMIN — Medication 2 PUFF(S): at 09:09

## 2024-07-06 RX ADMIN — Medication 200 MILLIGRAM(S): at 09:46

## 2024-07-06 RX ADMIN — Medication 1 MILLIGRAM(S): at 11:13

## 2024-07-06 RX ADMIN — Medication 2000 UNIT(S): at 11:13

## 2024-07-06 RX ADMIN — Medication 1 TABLET(S): at 11:13

## 2024-07-06 RX ADMIN — Medication 100 MILLIGRAM(S): at 11:13

## 2024-07-06 RX ADMIN — ASPIRIN 81 MILLIGRAM(S): 325 TABLET, FILM COATED ORAL at 11:13

## 2024-07-06 RX ADMIN — ENOXAPARIN SODIUM 40 MILLIGRAM(S): 100 INJECTION SUBCUTANEOUS at 09:46

## 2024-07-06 NOTE — PROGRESS NOTE ADULT - SUBJECTIVE AND OBJECTIVE BOX
Chart reviewed  Case discussed with nursing staff    CHIEF COMPLAINT:  Patient without major overnight events    SUBJECTIVE:  much more alert, verbal in comparison to yesterday  switched from IV abx to po  new order for am labs  coordination of care to optimize for anticipated transition to outpatient setting in approximately 24 hrs    REVIEW OF SYSTEMS:  no vomiting, no fevers, no respiratory distress, no chest pain      PHYSICAL EXAM  AFVSS  Constitutional:        NAD, lying n bed  HEENT:                     PERR, EOMI, Neck: Soft and supple, No LAD, No JVD  Respiratory:             Breath sounds are clear bilaterally, No wheezing, rales or rhonchi  Cardiovascular:       S1 and S2, regular rate and rhythm, no Murmurs, gallops or rubs  Gastrointestinal:     Bowel Sounds present, soft, nontender, nondistended, no guarding, no rebound  Extremities:             No peripheral edema  Vascular:                  2+ peripheral pulses  Neurological:           no focal motor deficits, exam limited, moving all extremities  Musculoskeletal:    strength unable to assess today  Skin:                          No rashes    MEDICATIONS:  Reviewed, compared and reconciled    LABS:                        12.6   10.89 )-----------( 378      ( 2024 13:35 )             39.0     07-04    142  |  106  |  21  ----------------------------<  99  4.7   |  31  |  0.80    Ca    9.6      2024 13:35    TPro  7.0  /  Alb  2.8<L>  /  TBili  0.3  /  DBili  x   /  AST  16  /  ALT  14  /  AlkPhos  123<H>  07-04    PT/INR - ( 2024 13:35 )   PT: 11.1 sec;   INR: 0.95 ratio         PTT - ( 2024 13:35 )  PTT:28.0 sec  Urinalysis Basic - ( 2024 15:40 )    Color: Yellow / Appearance: Clear / S.016 / pH: x  Gluc: x / Ketone: Negative mg/dL  / Bili: Negative / Urobili: 0.2 mg/dL   Blood: x / Protein: Negative mg/dL / Nitrite: Negative   Leuk Esterase: Large / RBC: 3 /HPF / WBC 2 /HPF   Sq Epi: x / Non Sq Epi: x / Bacteria: Negative /HPF    MEDICATIONS  (STANDING):  aspirin  chewable 81 milliGRAM(s) Oral daily  budesonide  80 MICROgram(s)/formoterol 4.5 MICROgram(s) Inhaler 2 Puff(s) Inhalation every 12 hours  cefpodoxime 200 milliGRAM(s) Oral every 12 hours  cholecalciferol 2000 Unit(s) Oral daily  enoxaparin Injectable 40 milliGRAM(s) SubCutaneous every 24 hours  folic acid 1 milliGRAM(s) Oral daily  multivitamin 1 Tablet(s) Oral daily  QUEtiapine 12.5 milliGRAM(s) Oral at bedtime  thiamine 100 milliGRAM(s) Oral daily    MEDICATIONS  (PRN):  acetaminophen     Tablet .. 650 milliGRAM(s) Oral every 6 hours PRN Temp greater or equal to 38C (100.4F), Mild Pain (1 - 3)  albuterol/ipratropium for Nebulization 3 milliLiter(s) Nebulizer every 4 hours PRN Shortness of Breath and/or Wheezing      DIAGNOSTICS:  Diagnostic images personally reviewed

## 2024-07-06 NOTE — PROGRESS NOTE ADULT - ASSESSMENT
Ms Garcia is an 88-year-old female, former smoker  with COPD, dementia,   sent in from the [facility] facility because of lethargy, generalized weakness for past few days.  In the ED, she had a temp of 100.4.  Urinalysis is + for blood +ve for leuk est, neg for nitrite.  She was given a dose of Zosyn and Vanco in the ED.   by 7/6 improved    Possible UTI,  COPD is stable and not in acute exacerbation    Empiric Ceftriaxone pending cultures  Continue current meds: Aspirin, Folic acid, Seroquel, thiamine, cholecalciferol  Continue LABA/inhaled steroid-Symbicort (pt is on Breo as outpt)  Duoneb prn  dopplers since pt is sedentary - to r/o DVT, pending  DVT prophylaxis    GOC discussed by admitting MD with pt's son, Estevan Garcia, he is HCP,  pt is a DNR/DNI  Mr. Garcia again and left a message so that the MOL[employer] forms could be filled out    new order for LABS in AM

## 2024-07-06 NOTE — PROVIDER CONTACT NOTE (OTHER) - SITUATION
Patient becoming more confused, combative, refused to be cleaned after soiling herself with feces. Patient refusing bedtime abx and Seroquel, and VS check.

## 2024-07-07 VITALS
SYSTOLIC BLOOD PRESSURE: 142 MMHG | DIASTOLIC BLOOD PRESSURE: 93 MMHG | HEART RATE: 59 BPM | OXYGEN SATURATION: 96 % | RESPIRATION RATE: 17 BRPM | TEMPERATURE: 98 F

## 2024-07-07 LAB
ANION GAP SERPL CALC-SCNC: 9 MMOL/L — SIGNIFICANT CHANGE UP (ref 5–17)
BASOPHILS # BLD AUTO: 0.11 K/UL — SIGNIFICANT CHANGE UP (ref 0–0.2)
BASOPHILS NFR BLD AUTO: 1.2 % — SIGNIFICANT CHANGE UP (ref 0–2)
BUN SERPL-MCNC: 19 MG/DL — SIGNIFICANT CHANGE UP (ref 7–23)
CALCIUM SERPL-MCNC: 9.5 MG/DL — SIGNIFICANT CHANGE UP (ref 8.4–10.5)
CHLORIDE SERPL-SCNC: 104 MMOL/L — SIGNIFICANT CHANGE UP (ref 96–108)
CO2 SERPL-SCNC: 27 MMOL/L — SIGNIFICANT CHANGE UP (ref 22–31)
CREAT SERPL-MCNC: 0.66 MG/DL — SIGNIFICANT CHANGE UP (ref 0.5–1.3)
EGFR: 84 ML/MIN/1.73M2 — SIGNIFICANT CHANGE UP
EOSINOPHIL # BLD AUTO: 0.58 K/UL — HIGH (ref 0–0.5)
EOSINOPHIL NFR BLD AUTO: 6.3 % — HIGH (ref 0–6)
GLUCOSE SERPL-MCNC: 90 MG/DL — SIGNIFICANT CHANGE UP (ref 70–99)
HCT VFR BLD CALC: 39.3 % — SIGNIFICANT CHANGE UP (ref 34.5–45)
HGB BLD-MCNC: 12.7 G/DL — SIGNIFICANT CHANGE UP (ref 11.5–15.5)
IMM GRANULOCYTES NFR BLD AUTO: 0.5 % — SIGNIFICANT CHANGE UP (ref 0–0.9)
LYMPHOCYTES # BLD AUTO: 1.66 K/UL — SIGNIFICANT CHANGE UP (ref 1–3.3)
LYMPHOCYTES # BLD AUTO: 18 % — SIGNIFICANT CHANGE UP (ref 13–44)
MCHC RBC-ENTMCNC: 30.2 PG — SIGNIFICANT CHANGE UP (ref 27–34)
MCHC RBC-ENTMCNC: 32.3 GM/DL — SIGNIFICANT CHANGE UP (ref 32–36)
MCV RBC AUTO: 93.3 FL — SIGNIFICANT CHANGE UP (ref 80–100)
MONOCYTES # BLD AUTO: 0.85 K/UL — SIGNIFICANT CHANGE UP (ref 0–0.9)
MONOCYTES NFR BLD AUTO: 9.2 % — SIGNIFICANT CHANGE UP (ref 2–14)
NEUTROPHILS # BLD AUTO: 5.95 K/UL — SIGNIFICANT CHANGE UP (ref 1.8–7.4)
NEUTROPHILS NFR BLD AUTO: 64.8 % — SIGNIFICANT CHANGE UP (ref 43–77)
NRBC # BLD: 0 /100 WBCS — SIGNIFICANT CHANGE UP (ref 0–0)
PLATELET # BLD AUTO: 328 K/UL — SIGNIFICANT CHANGE UP (ref 150–400)
POTASSIUM SERPL-MCNC: 4.2 MMOL/L — SIGNIFICANT CHANGE UP (ref 3.5–5.3)
POTASSIUM SERPL-SCNC: 4.2 MMOL/L — SIGNIFICANT CHANGE UP (ref 3.5–5.3)
RBC # BLD: 4.21 M/UL — SIGNIFICANT CHANGE UP (ref 3.8–5.2)
RBC # FLD: 15.4 % — HIGH (ref 10.3–14.5)
SODIUM SERPL-SCNC: 140 MMOL/L — SIGNIFICANT CHANGE UP (ref 135–145)
WBC # BLD: 9.2 K/UL — SIGNIFICANT CHANGE UP (ref 3.8–10.5)
WBC # FLD AUTO: 9.2 K/UL — SIGNIFICANT CHANGE UP (ref 3.8–10.5)

## 2024-07-07 PROCEDURE — 87040 BLOOD CULTURE FOR BACTERIA: CPT

## 2024-07-07 PROCEDURE — 87086 URINE CULTURE/COLONY COUNT: CPT

## 2024-07-07 PROCEDURE — 99239 HOSP IP/OBS DSCHRG MGMT >30: CPT

## 2024-07-07 PROCEDURE — 99285 EMERGENCY DEPT VISIT HI MDM: CPT

## 2024-07-07 PROCEDURE — 85027 COMPLETE CBC AUTOMATED: CPT

## 2024-07-07 PROCEDURE — 84484 ASSAY OF TROPONIN QUANT: CPT

## 2024-07-07 PROCEDURE — 71045 X-RAY EXAM CHEST 1 VIEW: CPT

## 2024-07-07 PROCEDURE — 85730 THROMBOPLASTIN TIME PARTIAL: CPT

## 2024-07-07 PROCEDURE — 81001 URINALYSIS AUTO W/SCOPE: CPT

## 2024-07-07 PROCEDURE — 80053 COMPREHEN METABOLIC PANEL: CPT

## 2024-07-07 PROCEDURE — 96375 TX/PRO/DX INJ NEW DRUG ADDON: CPT

## 2024-07-07 PROCEDURE — 83690 ASSAY OF LIPASE: CPT

## 2024-07-07 PROCEDURE — 93970 EXTREMITY STUDY: CPT

## 2024-07-07 PROCEDURE — 85025 COMPLETE CBC W/AUTO DIFF WBC: CPT

## 2024-07-07 PROCEDURE — 87637 SARSCOV2&INF A&B&RSV AMP PRB: CPT

## 2024-07-07 PROCEDURE — 94640 AIRWAY INHALATION TREATMENT: CPT

## 2024-07-07 PROCEDURE — 96365 THER/PROPH/DIAG IV INF INIT: CPT

## 2024-07-07 PROCEDURE — 85610 PROTHROMBIN TIME: CPT

## 2024-07-07 PROCEDURE — G0378: CPT

## 2024-07-07 PROCEDURE — 93005 ELECTROCARDIOGRAM TRACING: CPT

## 2024-07-07 PROCEDURE — 83605 ASSAY OF LACTIC ACID: CPT

## 2024-07-07 PROCEDURE — 80048 BASIC METABOLIC PNL TOTAL CA: CPT

## 2024-07-07 PROCEDURE — 36415 COLL VENOUS BLD VENIPUNCTURE: CPT

## 2024-07-07 RX ORDER — FLUTICASONE FUROATE AND VILANTEROL 100; 25 UG/1; UG/1
1 POWDER RESPIRATORY (INHALATION)
Qty: 1 | Refills: 0
Start: 2024-07-07 | End: 2024-08-05

## 2024-07-07 RX ORDER — ASPIRIN 325 MG/1
1 TABLET, FILM COATED ORAL
Qty: 30 | Refills: 0
Start: 2024-07-07 | End: 2024-08-05

## 2024-07-07 RX ORDER — ASPIRIN/MAG CARB/ALUMINUM AMIN 325 MG
1 TABLET ORAL
Qty: 30 | Refills: 0 | DISCHARGE
Start: 2024-07-07 | End: 2024-08-05

## 2024-07-07 RX ORDER — THIAMINE HCL 100 MG
1 TABLET ORAL
Qty: 30 | Refills: 0 | DISCHARGE
Start: 2024-07-07 | End: 2024-08-05

## 2024-07-07 RX ORDER — QUETIAPINE FUMARATE 200 MG/1
0.5 TABLET ORAL
Qty: 15 | Refills: 0 | DISCHARGE
Start: 2024-07-07 | End: 2024-08-05

## 2024-07-07 RX ORDER — FOLIC ACID
1 POWDER (GRAM) MISCELLANEOUS
Qty: 30 | Refills: 0
Start: 2024-07-07 | End: 2024-08-05

## 2024-07-07 RX ORDER — CHOLECALCIFEROL (VITAMIN D3) 625 MCG
1 CAPSULE ORAL
Qty: 30 | Refills: 0 | DISCHARGE
Start: 2024-07-07 | End: 2024-08-05

## 2024-07-07 RX ORDER — FOLIC ACID 1 MG/1
1 TABLET ORAL
Qty: 30 | Refills: 0 | DISCHARGE
Start: 2024-07-07 | End: 2024-08-05

## 2024-07-07 RX ORDER — THIAMINE HCL 100 MG
1 TABLET ORAL
Qty: 30 | Refills: 0
Start: 2024-07-07 | End: 2024-08-05

## 2024-07-07 RX ORDER — FLUTICASONE FUROATE AND VILANTEROL 100; 25 UG/1; UG/1
1 POWDER RESPIRATORY (INHALATION)
Qty: 1 | Refills: 0 | DISCHARGE
Start: 2024-07-07 | End: 2024-08-05

## 2024-07-07 RX ADMIN — Medication 1 MILLIGRAM(S): at 11:58

## 2024-07-07 RX ADMIN — Medication 2000 UNIT(S): at 11:57

## 2024-07-07 RX ADMIN — Medication 200 MILLIGRAM(S): at 08:55

## 2024-07-07 RX ADMIN — ASPIRIN 81 MILLIGRAM(S): 325 TABLET, FILM COATED ORAL at 11:59

## 2024-07-07 RX ADMIN — Medication 1 TABLET(S): at 11:57

## 2024-07-07 RX ADMIN — Medication 100 MILLIGRAM(S): at 11:59

## 2024-07-07 RX ADMIN — Medication 2 PUFF(S): at 09:36

## 2024-07-07 NOTE — DISCHARGE NOTE PROVIDER - ATTENDING DISCHARGE PHYSICAL EXAMINATION:
CHIEF COMPLAINT: No new complaints  SUBJECTIVE: Did well medically overnight  REVIEW OF SYSTEMS  no vomiting, no fevers, no respiratory distress    Vital signs reviewed and compared  PHYSICAL EXAM  AFVSS  Constitutional:        NAD, lying in bed  HEENT:                     PERR, EOMI, Neck: Soft and supple, No JVD  Respiratory:             Breath sounds are clear bilaterally, No wheezing, rales or rhonchi  Cardiovascular:       S1 and S2, regular rate and rhythm, no Murmurs, gallops or rubs  Gastrointestinal:     Bowel Sounds present, soft, nontender, nondistended, no guarding, no rebound  Extremities:             No peripheral edema  Vascular:                  2+ peripheral pulses  Neurological:           no focal motor deficits, exam limited, moving all extremities    MEDICATIONS:  Reviewed, compared and reconciled

## 2024-07-07 NOTE — DISCHARGE NOTE PROVIDER - HOSPITAL COURSE
Ms Garcia is an 88-year-old female, former smoker  with COPD, dementia, sent in from the [facility] facility   because of lethargy, generalized weakness for few days.  In the ED, she had a low grade fever of 100.4.  Urinalysis positive for blood, leuko esterase, negative for nitrite.  She was treated with Zosyn and Vanco in the ED, by 7/6 improved and switched to Ceftriaxone and then to Cefpodoxime for Possible UTI,  COPD controlled and not in exacerbation.  GOC were discussed by admitting MD with pt's son, Estevan Garcia, who is HCP,  pt is a DNR/DNI   Ms Garcia is an 88-year-old female, former smoker  with COPD, dementia, sent in from the [facility] facility   because of lethargy, generalized weakness for few days.  In the ED, she had a low grade fever of 100.4.  Urinalysis positive for blood, leuko esterase, negative for nitrite.  She was treated with Zosyn and Vanco in the ED, by 7/6 improved and switched to Ceftriaxone and then to Cefpodoxime for Possible UTI,  COPD controlled and not in exacerbation.  GOC were discussed by admitting MD with pt's son, Estevan Garcia, who is HCP,  pt is a DNR/DNI  Patient did well medically, in early morning hours was confused but oriented approriately.

## 2024-07-07 NOTE — DISCHARGE NOTE PROVIDER - NSDCFUADDAPPT_GEN_ALL_CORE_FT
Resources to the following clinics for Outpatient Mental Health Care: [facility] [place] [phone],  [facility] [phone], [facility] and Adult Outpatient Psych at [facility]. Please reach out to set up appointment with the location of your choosing.

## 2024-07-07 NOTE — DISCHARGE NOTE PROVIDER - NSDCMRMEDTOKEN_GEN_ALL_CORE_FT
aspirin 81 mg oral tablet, chewable: 1 tab(s) orally once a day  Breo Ellipta 100 mcg-25 mcg/inh inhalation powder: 1 puff(s) inhaled once a day  cholecalciferol oral tablet: 1 tab(s) orally once a day 2000 unit(s) orally once a day  folic acid 1 mg oral tablet: 1 tab(s) orally once a day  Seroquel 25 mg oral tablet: 0.5 tab(s) orally once a day  thiamine 100 mg oral tablet: 1 tab(s) orally once a day

## 2024-07-07 NOTE — DISCHARGE NOTE PROVIDER - NSFOLLOWUPCLINICS_GEN_ALL_ED_FT
Family Practice Clinic  Family Medicine  [address] [place] [id number]  Phone: [phone]  Fax:      Family Practice Clinic  Family Medicine  [address] [place] [id number]  Phone: [phone]  Fax:   Follow Up Time: 1 week

## 2024-07-10 LAB
CULTURE RESULTS: SIGNIFICANT CHANGE UP
CULTURE RESULTS: SIGNIFICANT CHANGE UP
SPECIMEN SOURCE: SIGNIFICANT CHANGE UP
SPECIMEN SOURCE: SIGNIFICANT CHANGE UP

## 2024-09-08 ENCOUNTER — EMERGENCY (EMERGENCY)
Facility: HOSPITAL | Age: 89
LOS: 1 days | Discharge: HOME | End: 2024-09-08
Attending: STUDENT IN AN ORGANIZED HEALTH CARE EDUCATION/TRAINING PROGRAM | Admitting: STUDENT IN AN ORGANIZED HEALTH CARE EDUCATION/TRAINING PROGRAM
Payer: MEDICARE

## 2024-09-08 VITALS
HEIGHT: 63 IN | WEIGHT: 139.99 LBS | SYSTOLIC BLOOD PRESSURE: 132 MMHG | TEMPERATURE: 97 F | RESPIRATION RATE: 18 BRPM | OXYGEN SATURATION: 96 % | HEART RATE: 84 BPM | DIASTOLIC BLOOD PRESSURE: 78 MMHG

## 2024-09-08 VITALS
OXYGEN SATURATION: 96 % | TEMPERATURE: 98 F | HEART RATE: 85 BPM | DIASTOLIC BLOOD PRESSURE: 74 MMHG | SYSTOLIC BLOOD PRESSURE: 136 MMHG | RESPIRATION RATE: 18 BRPM

## 2024-09-08 PROCEDURE — 70450 CT HEAD/BRAIN W/O DYE: CPT | Mod: MC

## 2024-09-08 PROCEDURE — 72125 CT NECK SPINE W/O DYE: CPT | Mod: MC

## 2024-09-08 PROCEDURE — 73590 X-RAY EXAM OF LOWER LEG: CPT | Mod: 26,RT

## 2024-09-08 PROCEDURE — 90715 TDAP VACCINE 7 YRS/> IM: CPT

## 2024-09-08 PROCEDURE — 70450 CT HEAD/BRAIN W/O DYE: CPT | Mod: 26,MC

## 2024-09-08 PROCEDURE — 73502 X-RAY EXAM HIP UNI 2-3 VIEWS: CPT

## 2024-09-08 PROCEDURE — 90471 IMMUNIZATION ADMIN: CPT

## 2024-09-08 PROCEDURE — 73502 X-RAY EXAM HIP UNI 2-3 VIEWS: CPT | Mod: 26,RT

## 2024-09-08 PROCEDURE — 99284 EMERGENCY DEPT VISIT MOD MDM: CPT | Mod: 25

## 2024-09-08 PROCEDURE — 72125 CT NECK SPINE W/O DYE: CPT | Mod: 26,MC

## 2024-09-08 PROCEDURE — 12011 RPR F/E/E/N/L/M 2.5 CM/<: CPT

## 2024-09-08 PROCEDURE — 73564 X-RAY EXAM KNEE 4 OR MORE: CPT

## 2024-09-08 PROCEDURE — 73564 X-RAY EXAM KNEE 4 OR MORE: CPT | Mod: 26,RT

## 2024-09-08 PROCEDURE — 73590 X-RAY EXAM OF LOWER LEG: CPT

## 2024-09-08 PROCEDURE — 99285 EMERGENCY DEPT VISIT HI MDM: CPT | Mod: 25

## 2024-09-08 PROCEDURE — 73552 X-RAY EXAM OF FEMUR 2/>: CPT | Mod: 26,RT

## 2024-09-08 PROCEDURE — 73552 X-RAY EXAM OF FEMUR 2/>: CPT

## 2024-09-08 RX ORDER — TETANUS TOXOID, REDUCED DIPHTHERIA TOXOID AND ACELLULAR PERTUSSIS VACCINE, ADSORBED 5; 2.5; 8; 8; 2.5 [IU]/.5ML; [IU]/.5ML; UG/.5ML; UG/.5ML; UG/.5ML
0.5 SUSPENSION INTRAMUSCULAR ONCE
Refills: 0 | Status: COMPLETED | OUTPATIENT
Start: 2024-09-08 | End: 2024-09-08

## 2024-09-08 RX ADMIN — TETANUS TOXOID, REDUCED DIPHTHERIA TOXOID AND ACELLULAR PERTUSSIS VACCINE, ADSORBED 0.5 MILLILITER(S): 5; 2.5; 8; 8; 2.5 SUSPENSION INTRAMUSCULAR at 13:15

## 2024-09-08 NOTE — ED PROVIDER NOTE - OBJECTIVE STATEMENT
Patient is a 89 year-old-female with history of COPD and dementia send in from [facility] with closed head injury after mechanical fall. Per EMS, patient had a witnessed mechanical fall, patient was seen walking in the dining room with her walker and tripped forward, hitting her head on a table and then fell to the ground on the right side. Patient denies fever, chills, vomiting chest pain, sob, abdominal pain. Patient c/o R hip pain and R knee pain.

## 2024-09-08 NOTE — ED ADULT NURSE REASSESSMENT NOTE - NS ED NURSE REASSESS COMMENT FT1
Pt incontinent of urine, cleaned and changed. Barrier cream applied. Repositioned for comfort. Pt awaiting transport back to facility. Will continue to monitor.

## 2024-09-08 NOTE — ED PROVIDER NOTE - PATIENT PORTAL LINK FT
You can access the FollowMyHealth Patient Portal offered by [facility] by registering at the following website: [email or web address]. By joining [facility]’s FollowMyHealth portal, you will also be able to view your health information using other applications (apps) compatible with our system.

## 2024-09-08 NOTE — ED ADULT NURSE NOTE - NSFALLHARMRISKINTERV_ED_ALL_ED

## 2024-09-08 NOTE — ED ADULT NURSE NOTE - OBJECTIVE STATEMENT
Pt presents to ED from home s/p fall. Pt s/p witnessed fall at [place] while using her walker. Pt fell onto right side. Laceration to right forehead, bleeding controlled. Pt denies current pain or need for pain medication.

## 2024-09-08 NOTE — ED PROVIDER NOTE - CLINICAL SUMMARY MEDICAL DECISION MAKING FREE TEXT BOX
Patient is a 89 year-old-female with history of COPD and dementia send in from [facility] with closed head injury after mechanical fall. Per EMS, patient had a witnessed mechanical fall, patient was seen walking in the dining room with her walker and tripped forward, hitting her head on a table and then fell to the ground on the right side. Patient c/o R hip pain and R knee pain. Exam remarkable for 2 superficial laceration in the R forehead with underlying hematoma. Will obtain CT head/c-spine to r/o acute intracranial pathologies and Xray of R hip/femur/knee/tibia/fibula to r/o acute fx. Will update tetanus and perform laceration repair. Patient is a 89 year-old-female with history of COPD and dementia send in from [facility] with closed head injury after mechanical fall. Per EMS, patient had a witnessed mechanical fall, patient was seen walking in the dining room with her walker and tripped forward, hitting her head on a table and then fell to the ground on the right side. Patient c/o R hip pain and R knee pain. Exam remarkable for 2 superficial laceration in the R forehead with underlying hematoma; active ROM of all 4 extremities but patient c/o pain with R knee/hip. Will obtain CT head/c-spine to r/o acute intracranial pathologies and Xray of R hip/femur/knee/tibia/fibula to r/o acute fx. Will update tetanus and perform laceration repair.

## 2024-09-08 NOTE — ED PROVIDER NOTE - PHYSICAL EXAMINATION
Vitals: I have reviewed the patients vital signs  General: Non-toxic appearing  HEENT: Normocephalic, airway patent, 2 superficial laceration noted in the R forehead with underlying hematoma - no active bleeding  Eyes: EOMI, tracking appropriately  Neck: no tracheal deviation  Chest/Lungs: symmetric chest rise, speaking in complete sentences, no WOB, no chest wall or sternal tenderness   Heart: skin and extremities well perfused, regular rate and rhythm  Abdomen: soft, nontender and nondistended   Neuro: A+Ox3, CN grossly intact  MSK: active ROM of all 4 extremities but c/o pain with R hip and R knee   Skin: no new emergent lesions

## 2024-09-08 NOTE — ED PROVIDER NOTE - NSFOLLOWUPINSTRUCTIONS_ED_ALL_ED_FT
You were seen in the emergency department for mechanical fall. You can find the results of all the tests in this discharge packet. Please follow up with your primary care doctor within 48 hours for continuation of care.     Return to the emergency department if you experience any new/concerning/worsening symptoms such as but not limited to: fever (>100.3F), intractable nausea, vomiting, chest pain, shortness of breath, abdominal pain.

## 2024-09-08 NOTE — ED PROVIDER NOTE - CARE PLAN
1 Principal Discharge DX:	Laceration of eyebrow and forehead, left, initial encounter  Secondary Diagnosis:	Scalp hematoma  Secondary Diagnosis:	Fall  Secondary Diagnosis:	Closed head injury   Principal Discharge DX:	Laceration of eyebrow and forehead, left, initial encounter  Secondary Diagnosis:	Scalp hematoma  Secondary Diagnosis:	Fall  Secondary Diagnosis:	Closed head injury  Secondary Diagnosis:	Right knee pain  Secondary Diagnosis:	Right hip pain

## 2024-09-08 NOTE — ED ADULT NURSE NOTE - CHIEF COMPLAINT QUOTE
Patient BIB EMS from [facility] s/p witnessed mechanical fall. Patient was walking in the dining room with her walker and tripped forward, hitting her head on a table and then falling to the ground on her right side. Patient notes right hip and right knee pain. Patient takes baby aspiring daily.

## 2024-09-08 NOTE — ED ADULT TRIAGE NOTE - CHIEF COMPLAINT QUOTE
Patient BIB EMS from [facility] s/p witnessed mechanical fall. Patient was walking in the dining room with her walker and tripped forward, hitting her head on a table and then falling to the ground on her right side. Patient notes right hip and right knee pain. Patient takes baby aspiring daily.

## 2024-11-01 ENCOUNTER — INPATIENT (INPATIENT)
Facility: HOSPITAL | Age: 89
LOS: 9 days | Discharge: NURSING, REHAB OR LONG-TERM CARE | DRG: 872 | End: 2024-11-11
Attending: STUDENT IN AN ORGANIZED HEALTH CARE EDUCATION/TRAINING PROGRAM | Admitting: STUDENT IN AN ORGANIZED HEALTH CARE EDUCATION/TRAINING PROGRAM
Payer: MEDICARE

## 2024-11-01 VITALS
WEIGHT: 139.99 LBS | DIASTOLIC BLOOD PRESSURE: 74 MMHG | TEMPERATURE: 101 F | OXYGEN SATURATION: 97 % | HEIGHT: 63 IN | SYSTOLIC BLOOD PRESSURE: 127 MMHG | HEART RATE: 98 BPM | RESPIRATION RATE: 18 BRPM

## 2024-11-01 DIAGNOSIS — A41.9 SEPSIS, UNSPECIFIED ORGANISM: ICD-10-CM

## 2024-11-01 LAB
ALBUMIN SERPL ELPH-MCNC: 3.3 G/DL — SIGNIFICANT CHANGE UP (ref 3.3–5)
ALP SERPL-CCNC: 122 U/L — HIGH (ref 40–120)
ALT FLD-CCNC: 14 U/L — SIGNIFICANT CHANGE UP (ref 10–45)
ANION GAP SERPL CALC-SCNC: 5 MMOL/L — SIGNIFICANT CHANGE UP (ref 5–17)
APPEARANCE UR: ABNORMAL
APTT BLD: 26.8 SEC — SIGNIFICANT CHANGE UP (ref 24.5–35.6)
AST SERPL-CCNC: 20 U/L — SIGNIFICANT CHANGE UP (ref 10–40)
BACTERIA # UR AUTO: NEGATIVE /HPF — SIGNIFICANT CHANGE UP
BASOPHILS # BLD AUTO: 0.06 K/UL — SIGNIFICANT CHANGE UP (ref 0–0.2)
BASOPHILS NFR BLD AUTO: 0.7 % — SIGNIFICANT CHANGE UP (ref 0–2)
BILIRUB SERPL-MCNC: 0.4 MG/DL — SIGNIFICANT CHANGE UP (ref 0.2–1.2)
BILIRUB UR-MCNC: NEGATIVE — SIGNIFICANT CHANGE UP
BUN SERPL-MCNC: 16 MG/DL — SIGNIFICANT CHANGE UP (ref 7–23)
CALCIUM SERPL-MCNC: 9.4 MG/DL — SIGNIFICANT CHANGE UP (ref 8.4–10.5)
CHLORIDE SERPL-SCNC: 98 MMOL/L — SIGNIFICANT CHANGE UP (ref 96–108)
CO2 SERPL-SCNC: 30 MMOL/L — SIGNIFICANT CHANGE UP (ref 22–31)
COLOR SPEC: YELLOW — SIGNIFICANT CHANGE UP
CREAT SERPL-MCNC: 0.77 MG/DL — SIGNIFICANT CHANGE UP (ref 0.5–1.3)
DIFF PNL FLD: ABNORMAL
EGFR: 73 ML/MIN/1.73M2 — SIGNIFICANT CHANGE UP
EOSINOPHIL # BLD AUTO: 0.04 K/UL — SIGNIFICANT CHANGE UP (ref 0–0.5)
EOSINOPHIL NFR BLD AUTO: 0.5 % — SIGNIFICANT CHANGE UP (ref 0–6)
EPI CELLS # UR: PRESENT
FLUAV AG NPH QL: SIGNIFICANT CHANGE UP
FLUBV AG NPH QL: SIGNIFICANT CHANGE UP
GLUCOSE SERPL-MCNC: 121 MG/DL — HIGH (ref 70–99)
GLUCOSE UR QL: NEGATIVE MG/DL — SIGNIFICANT CHANGE UP
HCT VFR BLD CALC: 41.2 % — SIGNIFICANT CHANGE UP (ref 34.5–45)
HGB BLD-MCNC: 13.8 G/DL — SIGNIFICANT CHANGE UP (ref 11.5–15.5)
IMM GRANULOCYTES NFR BLD AUTO: 0.6 % — SIGNIFICANT CHANGE UP (ref 0–0.9)
INR BLD: 0.98 RATIO — SIGNIFICANT CHANGE UP (ref 0.85–1.16)
KETONES UR-MCNC: NEGATIVE MG/DL — SIGNIFICANT CHANGE UP
LACTATE SERPL-SCNC: 1.3 MMOL/L — SIGNIFICANT CHANGE UP (ref 0.7–2)
LEUKOCYTE ESTERASE UR-ACNC: ABNORMAL
LYMPHOCYTES # BLD AUTO: 0.41 K/UL — LOW (ref 1–3.3)
LYMPHOCYTES # BLD AUTO: 4.9 % — LOW (ref 13–44)
MAGNESIUM SERPL-MCNC: 2.1 MG/DL — SIGNIFICANT CHANGE UP (ref 1.6–2.6)
MCHC RBC-ENTMCNC: 31.1 PG — SIGNIFICANT CHANGE UP (ref 27–34)
MCHC RBC-ENTMCNC: 33.5 G/DL — SIGNIFICANT CHANGE UP (ref 32–36)
MCV RBC AUTO: 92.8 FL — SIGNIFICANT CHANGE UP (ref 80–100)
MONOCYTES # BLD AUTO: 0.72 K/UL — SIGNIFICANT CHANGE UP (ref 0–0.9)
MONOCYTES NFR BLD AUTO: 8.7 % — SIGNIFICANT CHANGE UP (ref 2–14)
NEUTROPHILS # BLD AUTO: 7.02 K/UL — SIGNIFICANT CHANGE UP (ref 1.8–7.4)
NEUTROPHILS NFR BLD AUTO: 84.6 % — HIGH (ref 43–77)
NITRITE UR-MCNC: NEGATIVE — SIGNIFICANT CHANGE UP
NRBC # BLD: 0 /100 WBCS — SIGNIFICANT CHANGE UP (ref 0–0)
PH UR: 6.5 — SIGNIFICANT CHANGE UP (ref 5–8)
PLATELET # BLD AUTO: 283 K/UL — SIGNIFICANT CHANGE UP (ref 150–400)
POTASSIUM SERPL-MCNC: 4.1 MMOL/L — SIGNIFICANT CHANGE UP (ref 3.5–5.3)
POTASSIUM SERPL-SCNC: 4.1 MMOL/L — SIGNIFICANT CHANGE UP (ref 3.5–5.3)
PROT SERPL-MCNC: 7.4 G/DL — SIGNIFICANT CHANGE UP (ref 6–8.3)
PROT UR-MCNC: 30 MG/DL
PROTHROM AB SERPL-ACNC: 11.6 SEC — SIGNIFICANT CHANGE UP (ref 9.9–13.4)
RBC # BLD: 4.44 M/UL — SIGNIFICANT CHANGE UP (ref 3.8–5.2)
RBC # FLD: 13.7 % — SIGNIFICANT CHANGE UP (ref 10.3–14.5)
RBC CASTS # UR COMP ASSIST: 20 /HPF — HIGH (ref 0–4)
RSV RNA NPH QL NAA+NON-PROBE: SIGNIFICANT CHANGE UP
SARS-COV-2 RNA SPEC QL NAA+PROBE: DETECTED
SODIUM SERPL-SCNC: 133 MMOL/L — LOW (ref 135–145)
SP GR SPEC: 1.03 — SIGNIFICANT CHANGE UP (ref 1–1.03)
TROPONIN I, HIGH SENSITIVITY RESULT: 29.4 NG/L — SIGNIFICANT CHANGE UP
UROBILINOGEN FLD QL: 1 MG/DL — SIGNIFICANT CHANGE UP (ref 0.2–1)
WBC # BLD: 8.3 K/UL — SIGNIFICANT CHANGE UP (ref 3.8–10.5)
WBC # FLD AUTO: 8.3 K/UL — SIGNIFICANT CHANGE UP (ref 3.8–10.5)
WBC UR QL: 5 /HPF — SIGNIFICANT CHANGE UP (ref 0–5)

## 2024-11-01 PROCEDURE — 71045 X-RAY EXAM CHEST 1 VIEW: CPT | Mod: 26

## 2024-11-01 PROCEDURE — 70450 CT HEAD/BRAIN W/O DYE: CPT | Mod: 26,MC

## 2024-11-01 PROCEDURE — 99223 1ST HOSP IP/OBS HIGH 75: CPT | Mod: GC

## 2024-11-01 PROCEDURE — 74176 CT ABD & PELVIS W/O CONTRAST: CPT | Mod: 26,MC

## 2024-11-01 PROCEDURE — 71250 CT THORAX DX C-: CPT | Mod: 26,MC

## 2024-11-01 PROCEDURE — 99285 EMERGENCY DEPT VISIT HI MDM: CPT

## 2024-11-01 PROCEDURE — 72125 CT NECK SPINE W/O DYE: CPT | Mod: 26,MC

## 2024-11-01 PROCEDURE — 93010 ELECTROCARDIOGRAM REPORT: CPT

## 2024-11-01 RX ORDER — FLUTICASONE PROPIONATE AND SALMETEROL XINAFOATE 230; 21 UG/1; UG/1
1 AEROSOL, METERED RESPIRATORY (INHALATION)
Refills: 0 | Status: DISCONTINUED | OUTPATIENT
Start: 2024-11-01 | End: 2024-11-11

## 2024-11-01 RX ORDER — PIPERACILLIN AND TAZOBACTAM .5; 4 G/20ML; G/20ML
3.38 INJECTION, POWDER, LYOPHILIZED, FOR SOLUTION INTRAVENOUS EVERY 8 HOURS
Refills: 0 | Status: DISCONTINUED | OUTPATIENT
Start: 2024-11-01 | End: 2024-11-03

## 2024-11-01 RX ORDER — CHOLECALCIFEROL (VITAMIN D3) 625 MCG
2000 CAPSULE ORAL DAILY
Refills: 0 | Status: DISCONTINUED | OUTPATIENT
Start: 2024-11-01 | End: 2024-11-11

## 2024-11-01 RX ORDER — THIAMINE HCL 100 MG
100 TABLET ORAL DAILY
Refills: 0 | Status: DISCONTINUED | OUTPATIENT
Start: 2024-11-01 | End: 2024-11-11

## 2024-11-01 RX ORDER — ACETAMINOPHEN 500 MG
650 TABLET ORAL EVERY 6 HOURS
Refills: 0 | Status: DISCONTINUED | OUTPATIENT
Start: 2024-11-01 | End: 2024-11-11

## 2024-11-01 RX ORDER — SODIUM CHLORIDE 9 MG/ML
1000 INJECTION, SOLUTION INTRAMUSCULAR; INTRAVENOUS; SUBCUTANEOUS ONCE
Refills: 0 | Status: COMPLETED | OUTPATIENT
Start: 2024-11-01 | End: 2024-11-01

## 2024-11-01 RX ORDER — MAGNESIUM, ALUMINUM HYDROXIDE 200-200 MG
30 TABLET,CHEWABLE ORAL EVERY 4 HOURS
Refills: 0 | Status: DISCONTINUED | OUTPATIENT
Start: 2024-11-01 | End: 2024-11-11

## 2024-11-01 RX ORDER — VANCOMYCIN HYDROCHLORIDE 50 MG/ML
1000 KIT ORAL ONCE
Refills: 0 | Status: COMPLETED | OUTPATIENT
Start: 2024-11-01 | End: 2024-11-01

## 2024-11-01 RX ORDER — DEXAMETHASONE 1.5 MG 1.5 MG/1
6 TABLET ORAL DAILY
Refills: 0 | Status: DISCONTINUED | OUTPATIENT
Start: 2024-11-01 | End: 2024-11-02

## 2024-11-01 RX ORDER — ASPIRIN/MAG CARB/ALUMINUM AMIN 325 MG
81 TABLET ORAL DAILY
Refills: 0 | Status: DISCONTINUED | OUTPATIENT
Start: 2024-11-01 | End: 2024-11-02

## 2024-11-01 RX ORDER — REMDESIVIR 100 MG/1
INJECTION, POWDER, LYOPHILIZED, FOR SOLUTION INTRAVENOUS
Refills: 0 | Status: DISCONTINUED | OUTPATIENT
Start: 2024-11-01 | End: 2024-11-05

## 2024-11-01 RX ORDER — ONDANSETRON HYDROCHLORIDE 2 MG/ML
4 INJECTION, SOLUTION INTRAMUSCULAR; INTRAVENOUS EVERY 8 HOURS
Refills: 0 | Status: DISCONTINUED | OUTPATIENT
Start: 2024-11-01 | End: 2024-11-11

## 2024-11-01 RX ORDER — MORPHINE SULFATE 30 MG/1
2 TABLET, EXTENDED RELEASE ORAL ONCE
Refills: 0 | Status: DISCONTINUED | OUTPATIENT
Start: 2024-11-01 | End: 2024-11-01

## 2024-11-01 RX ORDER — ACETAMINOPHEN 500 MG
1000 TABLET ORAL ONCE
Refills: 0 | Status: COMPLETED | OUTPATIENT
Start: 2024-11-01 | End: 2024-11-01

## 2024-11-01 RX ORDER — FLUTICASONE FUROATE AND VILANTEROL 100; 25 UG/1; UG/1
1 POWDER RESPIRATORY (INHALATION)
Refills: 0 | DISCHARGE

## 2024-11-01 RX ORDER — PIPERACILLIN AND TAZOBACTAM .5; 4 G/20ML; G/20ML
3.38 INJECTION, POWDER, LYOPHILIZED, FOR SOLUTION INTRAVENOUS ONCE
Refills: 0 | Status: COMPLETED | OUTPATIENT
Start: 2024-11-01 | End: 2024-11-01

## 2024-11-01 RX ORDER — REMDESIVIR 100 MG/1
200 INJECTION, POWDER, LYOPHILIZED, FOR SOLUTION INTRAVENOUS EVERY 24 HOURS
Refills: 0 | Status: COMPLETED | OUTPATIENT
Start: 2024-11-01 | End: 2024-11-02

## 2024-11-01 RX ORDER — QUETIAPINE FUMARATE 200 MG/1
12.5 TABLET ORAL DAILY
Refills: 0 | Status: DISCONTINUED | OUTPATIENT
Start: 2024-11-01 | End: 2024-11-11

## 2024-11-01 RX ORDER — FOLIC ACID 1 MG/1
1 TABLET ORAL DAILY
Refills: 0 | Status: DISCONTINUED | OUTPATIENT
Start: 2024-11-01 | End: 2024-11-11

## 2024-11-01 RX ORDER — SODIUM CHLORIDE 9 MG/ML
1000 INJECTION, SOLUTION INTRAMUSCULAR; INTRAVENOUS; SUBCUTANEOUS
Refills: 0 | Status: DISCONTINUED | OUTPATIENT
Start: 2024-11-01 | End: 2024-11-03

## 2024-11-01 RX ADMIN — PIPERACILLIN AND TAZOBACTAM 200 GRAM(S): .5; 4 INJECTION, POWDER, LYOPHILIZED, FOR SOLUTION INTRAVENOUS at 18:34

## 2024-11-01 RX ADMIN — VANCOMYCIN HYDROCHLORIDE 250 MILLIGRAM(S): KIT at 18:35

## 2024-11-01 RX ADMIN — Medication 1000 MILLIGRAM(S): at 21:12

## 2024-11-01 RX ADMIN — SODIUM CHLORIDE 75 MILLILITER(S): 9 INJECTION, SOLUTION INTRAMUSCULAR; INTRAVENOUS; SUBCUTANEOUS at 23:27

## 2024-11-01 RX ADMIN — SODIUM CHLORIDE 1000 MILLILITER(S): 9 INJECTION, SOLUTION INTRAMUSCULAR; INTRAVENOUS; SUBCUTANEOUS at 18:19

## 2024-11-01 RX ADMIN — Medication 400 MILLIGRAM(S): at 18:19

## 2024-11-01 RX ADMIN — MORPHINE SULFATE 2 MILLIGRAM(S): 30 TABLET, EXTENDED RELEASE ORAL at 19:40

## 2024-11-01 RX ADMIN — MORPHINE SULFATE 2 MILLIGRAM(S): 30 TABLET, EXTENDED RELEASE ORAL at 21:12

## 2024-11-01 NOTE — H&P ADULT - NSHPPHYSICALEXAM_GEN_ALL_CORE
Vital Signs Last 24 Hrs  T(C): 37.3 (01 Nov 2024 21:12), Max: 38.2 (01 Nov 2024 17:44)  T(F): 99.1 (01 Nov 2024 21:12), Max: 100.8 (01 Nov 2024 17:44)  HR: 64 (01 Nov 2024 21:12) (64 - 98)  BP: 118/97 (01 Nov 2024 21:12) (109/63 - 139/84)  BP(mean): --  RR: 20 (01 Nov 2024 21:12) (18 - 22)  SpO2: 96% (01 Nov 2024 21:12) (94% - 98%)    Parameters below as of 01 Nov 2024 21:12  Patient On (Oxygen Delivery Method): nasal cannula  O2 Flow (L/min): 4 Vital Signs Last 24 Hrs  T(C): 37.3 (01 Nov 2024 21:12), Max: 38.2 (01 Nov 2024 17:44)  T(F): 99.1 (01 Nov 2024 21:12), Max: 100.8 (01 Nov 2024 17:44)  HR: 64 (01 Nov 2024 21:12) (64 - 98)  BP: 118/97 (01 Nov 2024 21:12) (109/63 - 139/84)  BP(mean): --  RR: 20 (01 Nov 2024 21:12) (18 - 22)  SpO2: 96% (01 Nov 2024 21:12) (94% - 98%)    Parameters below as of 01 Nov 2024 21:12  Patient On (Oxygen Delivery Method): nasal cannula  O2 Flow (L/min): 4    PHYSICAL EXAM:    GENERAL: NAD, lying in bed comfortably, Awake, alert, does not follow commands   HEAD:  Atraumatic, Normocephalic  EYES: EOMI, PERRLA, conjunctiva and sclera clear  ENT: Moist mucous membranes  NECK: Supple, No JVD  CHEST/LUNG: Audible expiratory  wheezing. lung exam without crackles  HEART: Regular rate and rhythm. S1 and S2. No murmurs, rubs, or gallops  ABDOMEN: Soft, Nontender, Nondistended. Bowel sounds present x4 quadrants; No hepatomegaly. No splenomegaly.  EXTREMITIES:  bilateral lower leg tenderness, 1+ pitting edema, 2+ Peripheral Pulses. Capillary refill <2 seconds.  NERVOUS SYSTEM:  minimally verbal, does not answer questions  MSK: unable to assess strength   SKIN: No rashes, bruises, or other lesions

## 2024-11-01 NOTE — ED ADULT NURSE REASSESSMENT NOTE - NS ED NURSE REASSESS COMMENT FT1
medicated for pain with morphine, pt c/o pain when moved around and when she went to ct scan, could not tolerate test

## 2024-11-01 NOTE — ED ADULT TRIAGE NOTE - CHIEF COMPLAINT QUOTE
Patient BIB EMS from assisted living s/p fall yesterday (no information provided about fall; witnessed vs unwitnessed; mechanical, LOC etc). Staff reports weakness and one episode of vomiting today, as well as altered mental status. Patient is febrile in triage.

## 2024-11-01 NOTE — ED ADULT NURSE NOTE - NSSEPSISSUSPECTED_ED_A_ED
Ms Uriostegui's niece Bernadine called to report that  is having non-stop left arm pain since going home from surgery and she has a large warm knot just above her wrist on her right arm. I spoke to Dr Carranza in regards to this, he wants her to come in to the office today to be seen by a PA. The patient is in agreement and her niece will bring her.   
No

## 2024-11-01 NOTE — ED PROVIDER NOTE - OBJECTIVE STATEMENT
89 year-old-female with history of COPD and dementia send in from [facility] presenting with altered mental status today.  Was found febrile at triage.  EMS reported prior fall unclear etiology and nursing home reported x-rays were performed but no results were  found with the patient. Denies any chest pain, abdominal pain, shortness of breath, nausea/vomiting, headaches,   diarrhea  weakness, syncope,  dysuria, urinary symptoms, subjective neurological deficits.

## 2024-11-01 NOTE — ED PROVIDER NOTE - EKG/XRAY ADDITIONAL INFORMATION
sinus tachycardia at 102 bpm, NC at 170 ms, QRS at 70 ms, QTc at 450 ms, there are no ST elevations or ST depressions.  There are premature ventricular contractions.

## 2024-11-01 NOTE — H&P ADULT - ASSESSMENT
#Metabolic encephalopathy 2/2 COVID 19 infection  - admit to medicine   - CT head with no acute intracranial pathology; multi-level degenerative  changes in cspine  - CT abdomen pelvis: diverticulosis gallstones, renal cyst, but no acute pathology   - s/p zosyn, vancomycin x1  - RR 18  Tmax 100.8  - lactate 1.3, no WBC  - CXR wet read with mild bilateral infiltrates   - Maintain on airborne isolation  - tylenol prn for temps   - maintenance fluids NS 75 cc/hr   - start Remdesivir   - dexamethasone 6mg QD  - montior hepatic panel  - continuous pulse ox  - monitor blood pressures  - ID consult     #mild hyponatremia   - Na 133   - NS 75cc/hr   - AM labs     weakness  - PT/OT eval    89 year-old-female with history of COPD, former smoker and dementia send in from [facility] facility admitted for AMS 2/2 COVID-19 PNA.      # COVID-19 infection  - admit to medicine   - s/p zosyn, vancomycin x1 , continue with zozyn until negative blood cultures   - RR 18  Tmax 100.8, reported by EMS that patient had an episode of desaturation into the 80s   - lactate 1.3, no WBC  - CXR wet read with mild bilateral infiltrates   - Maintain on airborne isolation  - tylenol prn for temps   - maintenance fluids NS 75 cc/hr   - start Remdesivir x 5 days   - dexamethasone 6mg daily x 10 days   - aspiration precautions  - f/u MSRSA swab, legionella ur, strep ur, procalcitonin  - continuous pulse ox  - ID consult     #Metabolic encephalopathy 2/2 covid pna   #dementia   - baseline verbal with confusion per Son  - CT head with no acute intracranial pathology; multi-level degenerative  changes in c-spine  - CT abdomen pelvis: diverticulosis gallstones, renal cyst, but no acute pathology   - f/u ABG to look for CO2 rentention    #COPD  -  not in acute exacerbation   -  continue advair as substitution for Dulce-Ellipta  - maintain O2 sat  88-92%    #mild hyponatremia   - Na 133   - NS 75cc/hr   - AM labs     #lower leg pain   - Bilateral dopplers to r/o DVT  - fall precautions   - PT consult     #DVT ppx  - lovenox     Discussed with Dr. Moraes   Called and discussed with Son Estevan Garcia ; patient remains DNR/DNI

## 2024-11-01 NOTE — H&P ADULT - HISTORY OF PRESENT ILLNESS
89 year-old-female with history of COPD and dementia send in from [facility] presenting with altered mental status today.  Was found febrile at triage.  EMS reported prior fall unclear etiology and nursing home reported x-rays were performed but no results were  found with the patient. Denies any chest pain, abdominal pain, shortness of breath, nausea/vomiting, headaches,   diarrhea  weakness, syncope,  dysuria, urinary symptoms, subjective neurological deficits.    Upon arrival vitals 127/74, HR 98, RR 18, Temp 100.8, SpO2 97% on RA  89 year-old-female with history of COPD, former smoker,  and dementia sent in from [facility] presenting with altered mental status and listlessness today.  Was found febrile at triage. EMS reported prior fall unclear etiology and nursing home reported x-rays were performed but no results were found with the patient. Patient is minimally verbal, ros unable to be obtained.       Upon arrival vitals 127/74, HR 98, RR 18, Temp 100.8, SpO2 97% on RA   EKG reviewed, sinus tachycardia, no ST elevations or t-wave inversions   Chest xray wet read with mild bilateral infiltrates  89 year-old-female with history of COPD, former smoker,  and dementia sent in from [facility] presenting with altered mental status and listlessness today.  Was found febrile at triage. EMS reported prior fall unclear etiology and nursing home reported x-rays were performed but no results were found with the patient. Patient is minimally verbal, ros unable to be obtained.       Upon arrival vitals 127/74, HR 98, RR 18, Temp 100.8, SpO2 97% on 3L NC  EKG reviewed, sinus tachycardia, no ST elevations or t-wave inversions   Chest xray wet read with mild bilateral infiltrates

## 2024-11-01 NOTE — H&P ADULT - ATTENDING COMMENTS
#AMS 2/2 covid PNA  - remdesivir  - dex 6mg daily  - zosyn  - f/up bcx  - ns 75cc/hr  - s/p vanc and zosyn in the ED  - procalcitonin

## 2024-11-01 NOTE — H&P ADULT - NSHPREVIEWOFSYSTEMS_GEN_ALL_CORE
REVIEW OF SYSTEMS:    CONSTITUTIONAL: (-) weakness, (-) fevers, (-) chills, (-) coughing, (-)sneezing  EYES/ENT: (-) visual changes,  (-) vertigo,  (-) throat pain   NECK:  (-) pain, (-) stiffness  RESPIRATORY:  (-) shortness of breath, (-) cough, (-) sneezing, (-) wheezing,  (-) hemoptysis   CARDIOVASCULAR:  (-) chest pain, (-) palpitations  GASTROINTESTINAL:  (-) abdominal or epigastric pain, (-) nausea, (-) vomiting, (-) diarrhea, (-) constipation, (-) melena,  (-) hematemesis,  (-) hematochezia  GENITOURINARY: (-) dysuria, (-) frequency, (-) hematuria  NEUROLOGICAL: (-) numbness, (-) weakness  SKIN: (-) itching, (-) rashes, (-) lesions

## 2024-11-01 NOTE — ED ADULT NURSE NOTE - NSFALLHARMRISKINTERV_ED_ALL_ED
Assistance OOB with selected safe patient handling equipment if applicable/Assistance with ambulation/Communicate risk of Fall with Harm to all staff, patient, and family/Monitor gait and stability/Monitor for mental status changes and reorient to person, place, and time, as needed/Provide visual cue: red socks, yellow wristband, yellow gown, etc/Reinforce activity limits and safety measures with patient and family/Toileting schedule using arm’s reach rule for commode and bathroom/Use of alarms - bed, stretcher, chair and/or video monitoring/Bed in lowest position, wheels locked, appropriate side rails in place/Call bell, personal items and telephone in reach/Instruct patient to call for assistance before getting out of bed/chair/stretcher/Non-slip footwear applied when patient is off stretcher/[place] to call system/Physically safe environment - no spills, clutter or unnecessary equipment/Purposeful Proactive Rounding/Room/bathroom lighting operational, light cord in reach

## 2024-11-01 NOTE — ED PROVIDER NOTE - PHYSICAL EXAMINATION
VITAL SIGNS: I have reviewed nursing notes and confirm.   GEN: Well-developed; well-nourished; in no acute distress. Speaking full sentences.  SKIN: Warm, pink, no rash, no diaphoresis, no cyanosis, well perfused.   HEAD: Normocephalic; atraumatic.    NECK: Supple; non tender. Full range of motion. (+) NON meningitic  EYES: Pupils 3mm equal, round, reactive to light and accomodation, conjunctiva and sclera clear. Extra-ocular movements intact bilaterally.  ENT: No nasal discharge; airway clear. Trachea is midline.    CV: RRR. S1, S2 normal; no murmurs, gallops, or rubs. Capillary refill < 2 seconds throughout. Distal pulses intact 2+ throughout.  RESP: (+) mild rhonchi throughout, no wheezing, (+) bibasilar rales. no retractions.  ABD: Normal bowel sounds, soft, non-distended, non-tender, no rebound, no guarding, no rigidity   MSK: Normal range of motion and movement of all 4 extremities. No apparent joint or muscular pain throughout.    BACK: No thoracolumbar midline or paravertebral tenderness.    NEURO: Alert & oriented x name,

## 2024-11-01 NOTE — ED PROVIDER NOTE - CLINICAL SUMMARY MEDICAL DECISION MAKING FREE TEXT BOX
89 year-old-female with history of COPD and dementia send in from [facility] presenting with altered mental status today.  Was found febrile at triage.  EMS reported prior fall unclear etiology and nursing home reported x-rays were performed but no results were  found with the patient. Denies any chest pain, abdominal pain, shortness of breath, nausea/vomiting, headaches,   diarrhea  weakness, syncope,  dysuria, urinary symptoms, subjective neurological deficits.     Patient is presenting with altered mental status, concerning for rule out infectious sources and rule out traumatic injuries from fall. DDX includes but is not limited to: toxic metabolic etiologies such as electrolyte disturbances, hypoglycemia, uremia, acidosis states, infectious etiology, toxidromes of intoxication or withdrawal, hypoxemia or hypercarbia, liver disease or failure causing hepatic encephalopathy, endocrine emergencies such as hypothyroidism or hyperthyroidism, adrenal insufficiency), seiaure, trauma, intracranial bleeding, ischemic stroke, atypical ACS/NSTEMI.  PLAN:  - FSG, cardiac monitor, CBC, CMP,   EKG,  troponin,  covid swabs.   - UA/UCX, BCx, CXR, Rectal temperature  - pan ct for rule out injuries and source of fever  - Re-evaluation and disposition accordingly.

## 2024-11-02 LAB
-  COAGULASE NEGATIVE STAPHYLOCOCCUS: SIGNIFICANT CHANGE UP
ALBUMIN SERPL ELPH-MCNC: 2.8 G/DL — LOW (ref 3.3–5)
ALBUMIN SERPL ELPH-MCNC: 2.8 G/DL — LOW (ref 3.3–5)
ALP SERPL-CCNC: 101 U/L — SIGNIFICANT CHANGE UP (ref 40–120)
ALP SERPL-CCNC: 103 U/L — SIGNIFICANT CHANGE UP (ref 40–120)
ALT FLD-CCNC: 12 U/L — SIGNIFICANT CHANGE UP (ref 10–45)
ALT FLD-CCNC: 17 U/L — SIGNIFICANT CHANGE UP (ref 10–45)
ANION GAP SERPL CALC-SCNC: 8 MMOL/L — SIGNIFICANT CHANGE UP (ref 5–17)
AST SERPL-CCNC: 20 U/L — SIGNIFICANT CHANGE UP (ref 10–40)
AST SERPL-CCNC: 25 U/L — SIGNIFICANT CHANGE UP (ref 10–40)
BASE EXCESS BLDA CALC-SCNC: 1.6 MMOL/L — SIGNIFICANT CHANGE UP (ref -2–3)
BILIRUB DIRECT SERPL-MCNC: 0 MG/DL — SIGNIFICANT CHANGE UP (ref 0–0.3)
BILIRUB INDIRECT FLD-MCNC: 0.3 MG/DL — SIGNIFICANT CHANGE UP (ref 0.2–1)
BILIRUB SERPL-MCNC: 0.3 MG/DL — SIGNIFICANT CHANGE UP (ref 0.2–1.2)
BILIRUB SERPL-MCNC: 0.4 MG/DL — SIGNIFICANT CHANGE UP (ref 0.2–1.2)
BUN SERPL-MCNC: 14 MG/DL — SIGNIFICANT CHANGE UP (ref 7–23)
CALCIUM SERPL-MCNC: 9 MG/DL — SIGNIFICANT CHANGE UP (ref 8.4–10.5)
CHLORIDE SERPL-SCNC: 106 MMOL/L — SIGNIFICANT CHANGE UP (ref 96–108)
CO2 BLDA-SCNC: 27 MMOL/L — HIGH (ref 19–24)
CO2 SERPL-SCNC: 26 MMOL/L — SIGNIFICANT CHANGE UP (ref 22–31)
CREAT SERPL-MCNC: 0.7 MG/DL — SIGNIFICANT CHANGE UP (ref 0.5–1.3)
CREAT SERPL-MCNC: 0.71 MG/DL — SIGNIFICANT CHANGE UP (ref 0.5–1.3)
EGFR: 81 ML/MIN/1.73M2 — SIGNIFICANT CHANGE UP
EGFR: 83 ML/MIN/1.73M2 — SIGNIFICANT CHANGE UP
GAS PNL BLDA: SIGNIFICANT CHANGE UP
GLUCOSE SERPL-MCNC: 91 MG/DL — SIGNIFICANT CHANGE UP (ref 70–99)
GRAM STN FLD: ABNORMAL
GRAM STN FLD: ABNORMAL
HCO3 BLDA-SCNC: 26 MMOL/L — SIGNIFICANT CHANGE UP (ref 21–28)
HCT VFR BLD CALC: 40.7 % — SIGNIFICANT CHANGE UP (ref 34.5–45)
HGB BLD-MCNC: 13.3 G/DL — SIGNIFICANT CHANGE UP (ref 11.5–15.5)
INR BLD: 1.01 RATIO — SIGNIFICANT CHANGE UP (ref 0.85–1.16)
MCHC RBC-ENTMCNC: 31.3 PG — SIGNIFICANT CHANGE UP (ref 27–34)
MCHC RBC-ENTMCNC: 32.7 G/DL — SIGNIFICANT CHANGE UP (ref 32–36)
MCV RBC AUTO: 95.8 FL — SIGNIFICANT CHANGE UP (ref 80–100)
METHOD TYPE: SIGNIFICANT CHANGE UP
NRBC # BLD: 0 /100 WBCS — SIGNIFICANT CHANGE UP (ref 0–0)
PCO2 BLDA: 39 MMHG — HIGH (ref 32–35)
PH BLDA: 7.43 — SIGNIFICANT CHANGE UP (ref 7.35–7.45)
PLATELET # BLD AUTO: 232 K/UL — SIGNIFICANT CHANGE UP (ref 150–400)
PO2 BLDA: 72 MMHG — LOW (ref 83–108)
POTASSIUM SERPL-MCNC: 3.9 MMOL/L — SIGNIFICANT CHANGE UP (ref 3.5–5.3)
POTASSIUM SERPL-SCNC: 3.9 MMOL/L — SIGNIFICANT CHANGE UP (ref 3.5–5.3)
PROCALCITONIN SERPL-MCNC: 0.08 NG/ML — SIGNIFICANT CHANGE UP
PROT SERPL-MCNC: 6.5 G/DL — SIGNIFICANT CHANGE UP (ref 6–8.3)
PROT SERPL-MCNC: 6.6 G/DL — SIGNIFICANT CHANGE UP (ref 6–8.3)
PROTHROM AB SERPL-ACNC: 11.9 SEC — SIGNIFICANT CHANGE UP (ref 9.9–13.4)
RBC # BLD: 4.25 M/UL — SIGNIFICANT CHANGE UP (ref 3.8–5.2)
RBC # FLD: 14.2 % — SIGNIFICANT CHANGE UP (ref 10.3–14.5)
SAO2 % BLDA: 95.7 % — SIGNIFICANT CHANGE UP (ref 94–98)
SODIUM SERPL-SCNC: 140 MMOL/L — SIGNIFICANT CHANGE UP (ref 135–145)
SPECIMEN SOURCE: SIGNIFICANT CHANGE UP
SPECIMEN SOURCE: SIGNIFICANT CHANGE UP
WBC # BLD: 7.24 K/UL — SIGNIFICANT CHANGE UP (ref 3.8–10.5)
WBC # FLD AUTO: 7.24 K/UL — SIGNIFICANT CHANGE UP (ref 3.8–10.5)

## 2024-11-02 PROCEDURE — 93970 EXTREMITY STUDY: CPT | Mod: 26

## 2024-11-02 PROCEDURE — 99232 SBSQ HOSP IP/OBS MODERATE 35: CPT

## 2024-11-02 RX ORDER — ENOXAPARIN SODIUM 40MG/0.4ML
40 SYRINGE (ML) SUBCUTANEOUS EVERY 24 HOURS
Refills: 0 | Status: DISCONTINUED | OUTPATIENT
Start: 2024-11-02 | End: 2024-11-04

## 2024-11-02 RX ORDER — REMDESIVIR 100 MG/1
100 INJECTION, POWDER, LYOPHILIZED, FOR SOLUTION INTRAVENOUS EVERY 24 HOURS
Refills: 0 | Status: DISCONTINUED | OUTPATIENT
Start: 2024-11-03 | End: 2024-11-05

## 2024-11-02 RX ORDER — ASPIRIN/MAG CARB/ALUMINUM AMIN 325 MG
81 TABLET ORAL DAILY
Refills: 0 | Status: DISCONTINUED | OUTPATIENT
Start: 2024-11-02 | End: 2024-11-11

## 2024-11-02 RX ORDER — PETROLATUM 987.89 MG/G
1 OINTMENT TOPICAL
Refills: 0 | Status: DISCONTINUED | OUTPATIENT
Start: 2024-11-02 | End: 2024-11-11

## 2024-11-02 RX ORDER — VANCOMYCIN HYDROCHLORIDE 50 MG/ML
1000 KIT ORAL ONCE
Refills: 0 | Status: COMPLETED | OUTPATIENT
Start: 2024-11-02 | End: 2024-11-02

## 2024-11-02 RX ORDER — DEXAMETHASONE 1.5 MG 1.5 MG/1
6 TABLET ORAL DAILY
Refills: 0 | Status: DISCONTINUED | OUTPATIENT
Start: 2024-11-02 | End: 2024-11-05

## 2024-11-02 RX ADMIN — FOLIC ACID 1 MILLIGRAM(S): 1 TABLET ORAL at 16:38

## 2024-11-02 RX ADMIN — Medication 2000 UNIT(S): at 16:39

## 2024-11-02 RX ADMIN — PIPERACILLIN AND TAZOBACTAM 25 GRAM(S): .5; 4 INJECTION, POWDER, LYOPHILIZED, FOR SOLUTION INTRAVENOUS at 16:35

## 2024-11-02 RX ADMIN — QUETIAPINE FUMARATE 12.5 MILLIGRAM(S): 200 TABLET ORAL at 16:39

## 2024-11-02 RX ADMIN — REMDESIVIR 200 MILLIGRAM(S): 100 INJECTION, POWDER, LYOPHILIZED, FOR SOLUTION INTRAVENOUS at 11:47

## 2024-11-02 RX ADMIN — PIPERACILLIN AND TAZOBACTAM 25 GRAM(S): .5; 4 INJECTION, POWDER, LYOPHILIZED, FOR SOLUTION INTRAVENOUS at 21:53

## 2024-11-02 RX ADMIN — DEXAMETHASONE 1.5 MG 6 MILLIGRAM(S): 1.5 TABLET ORAL at 07:33

## 2024-11-02 RX ADMIN — PIPERACILLIN AND TAZOBACTAM 25 GRAM(S): .5; 4 INJECTION, POWDER, LYOPHILIZED, FOR SOLUTION INTRAVENOUS at 08:19

## 2024-11-02 RX ADMIN — Medication 81 MILLIGRAM(S): at 16:39

## 2024-11-02 RX ADMIN — Medication 650 MILLIGRAM(S): at 08:20

## 2024-11-02 RX ADMIN — PETROLATUM 1 APPLICATION(S): 987.89 OINTMENT TOPICAL at 20:49

## 2024-11-02 RX ADMIN — VANCOMYCIN HYDROCHLORIDE 250 MILLIGRAM(S): KIT at 20:51

## 2024-11-02 RX ADMIN — FLUTICASONE PROPIONATE AND SALMETEROL XINAFOATE 1 DOSE(S): 230; 21 AEROSOL, METERED RESPIRATORY (INHALATION) at 20:15

## 2024-11-02 RX ADMIN — ONDANSETRON HYDROCHLORIDE 4 MILLIGRAM(S): 2 INJECTION, SOLUTION INTRAMUSCULAR; INTRAVENOUS at 08:20

## 2024-11-02 RX ADMIN — Medication 100 MILLIGRAM(S): at 16:39

## 2024-11-02 RX ADMIN — Medication 30 MILLILITER(S): at 08:20

## 2024-11-02 RX ADMIN — Medication 650 MILLIGRAM(S): at 08:50

## 2024-11-02 RX ADMIN — FLUTICASONE PROPIONATE AND SALMETEROL XINAFOATE 1 DOSE(S): 230; 21 AEROSOL, METERED RESPIRATORY (INHALATION) at 11:47

## 2024-11-02 NOTE — PROGRESS NOTE ADULT - SUBJECTIVE AND OBJECTIVE BOX
|-------------------------------------------|                       Subjective   |-------------------------------------------|    No events overnight  States she feels well, but is unable to identify her home, or this hospital    |------------------------------------------|                       Objective  |------------------------------------------|    Vital Signs Last 24 Hrs  T(F): 97 (02 Nov 2024 08:05), Max: 100.8 (01 Nov 2024 17:44)  HR: 77 (02 Nov 2024 08:05) (60 - 98)  BP: 135/76 (02 Nov 2024 08:05) (107/76 - 139/84)  RR: 18 (02 Nov 2024 08:05) (18 - 22)  SpO2: 94% (02 Nov 2024 08:05) (94% - 98%)  I&O's Summary      Examination:   General: NAD, Comfortable appearing, non toxic. xerosis of lips  Pulm: CTA BL No Rhonchi Rales or wheezes  Neck: Supple, No JVD  CVS: RRR No rubs murmurs or gallops  Abdomen: Soft, Nontender, Nondistended; No masses or organomegally  Extremities: No calf tenderness, No pitting edema    Labs:                        13.3   7.24  )-----------( 232      ( 02 Nov 2024 07:17 )             40.7       11-02    x   |  x   |  x   ----------------------------<  x   x    |  x   |  0.70    Ca    9.4      01 Nov 2024 18:00  Mg     2.1     11-01    TPro  6.5  /  Alb  2.8  /  TBili  0.3  /  DBili  0.0  /  AST  25  /  ALT  17  /  AlkPhos  101  11-02     PT/INR - ( 02 Nov 2024 07:17 )   PT: 11.9 sec;   INR: 1.01 ratio         PTT - ( 01 Nov 2024 18:00 )  PTT:26.8 sec

## 2024-11-02 NOTE — PROGRESS NOTE ADULT - ASSESSMENT
89F from NH with HO COPD, Former Smoker, Dementiawith history of COPD, former smoker and dementia  Admit Nov2 for worsening confusion/AMS, Hypoxemia, COVID 19 PNA s    COVID19  w/ mild hypoxemia  O2 Nasal canula  Remdesivier and dexamethasone started    Sepsis  Zosyn and vanco started empirically in ED  DC pending BC results  ID consulted    Metabolic encephalopathy 2/2 covid pna    baseline verbal with confusion per Son  CT head with no acute intracranial pathology; multi-level degenerative  changes in c-spine  CT abdomen pelvis: diverticulosis gallstones, renal cyst, but no acute pathology   f/u ABG to look for CO2 rentention    COPD  not in acute exacerbation   albuterol prn and advair    lower leg pain   Bilateral dopplers unremarkable  fall precautions   PT consulted     Xerosis  Skin precautions. Gentle skin care with mild soaps  encourage po intake.    DVT ppx  lovenox    - generalized xerosis, skin precautions, gentle soapencourage PO feeds

## 2024-11-03 LAB
ALBUMIN SERPL ELPH-MCNC: 2.8 G/DL — LOW (ref 3.3–5)
ALP SERPL-CCNC: 97 U/L — SIGNIFICANT CHANGE UP (ref 40–120)
ALT FLD-CCNC: 13 U/L — SIGNIFICANT CHANGE UP (ref 10–45)
AST SERPL-CCNC: 22 U/L — SIGNIFICANT CHANGE UP (ref 10–40)
BILIRUB DIRECT SERPL-MCNC: 0 MG/DL — SIGNIFICANT CHANGE UP (ref 0–0.3)
BILIRUB INDIRECT FLD-MCNC: 0.3 MG/DL — SIGNIFICANT CHANGE UP (ref 0.2–1)
BILIRUB SERPL-MCNC: 0.3 MG/DL — SIGNIFICANT CHANGE UP (ref 0.2–1.2)
CREAT SERPL-MCNC: 0.63 MG/DL — SIGNIFICANT CHANGE UP (ref 0.5–1.3)
CULTURE RESULTS: ABNORMAL
CULTURE RESULTS: ABNORMAL
EGFR: 85 ML/MIN/1.73M2 — SIGNIFICANT CHANGE UP
GRAM STN FLD: ABNORMAL
INR BLD: 0.98 RATIO — SIGNIFICANT CHANGE UP (ref 0.85–1.16)
LEGIONELLA AG UR QL: NEGATIVE — SIGNIFICANT CHANGE UP
MRSA PCR RESULT.: SIGNIFICANT CHANGE UP
ORGANISM # SPEC MICROSCOPIC CNT: ABNORMAL
ORGANISM # SPEC MICROSCOPIC CNT: ABNORMAL
ORGANISM # SPEC MICROSCOPIC CNT: SIGNIFICANT CHANGE UP
ORGANISM # SPEC MICROSCOPIC CNT: SIGNIFICANT CHANGE UP
PROT SERPL-MCNC: 6.5 G/DL — SIGNIFICANT CHANGE UP (ref 6–8.3)
PROTHROM AB SERPL-ACNC: 11.6 SEC — SIGNIFICANT CHANGE UP (ref 9.9–13.4)
S AUREUS DNA NOSE QL NAA+PROBE: SIGNIFICANT CHANGE UP
S PNEUM AG UR QL: NEGATIVE — SIGNIFICANT CHANGE UP
SPECIMEN SOURCE: SIGNIFICANT CHANGE UP

## 2024-11-03 PROCEDURE — 99233 SBSQ HOSP IP/OBS HIGH 50: CPT

## 2024-11-03 RX ORDER — MELATONIN 5 MG
3 TABLET ORAL AT BEDTIME
Refills: 0 | Status: DISCONTINUED | OUTPATIENT
Start: 2024-11-03 | End: 2024-11-11

## 2024-11-03 RX ADMIN — Medication 81 MILLIGRAM(S): at 11:57

## 2024-11-03 RX ADMIN — QUETIAPINE FUMARATE 12.5 MILLIGRAM(S): 200 TABLET ORAL at 11:57

## 2024-11-03 RX ADMIN — Medication 40 MILLIGRAM(S): at 05:39

## 2024-11-03 RX ADMIN — PIPERACILLIN AND TAZOBACTAM 25 GRAM(S): .5; 4 INJECTION, POWDER, LYOPHILIZED, FOR SOLUTION INTRAVENOUS at 05:39

## 2024-11-03 RX ADMIN — PETROLATUM 1 APPLICATION(S): 987.89 OINTMENT TOPICAL at 06:00

## 2024-11-03 RX ADMIN — FOLIC ACID 1 MILLIGRAM(S): 1 TABLET ORAL at 11:57

## 2024-11-03 RX ADMIN — REMDESIVIR 200 MILLIGRAM(S): 100 INJECTION, POWDER, LYOPHILIZED, FOR SOLUTION INTRAVENOUS at 11:45

## 2024-11-03 RX ADMIN — PETROLATUM 1 APPLICATION(S): 987.89 OINTMENT TOPICAL at 18:18

## 2024-11-03 RX ADMIN — Medication 3 MILLIGRAM(S): at 21:39

## 2024-11-03 RX ADMIN — Medication 2000 UNIT(S): at 11:58

## 2024-11-03 RX ADMIN — FLUTICASONE PROPIONATE AND SALMETEROL XINAFOATE 1 DOSE(S): 230; 21 AEROSOL, METERED RESPIRATORY (INHALATION) at 08:48

## 2024-11-03 RX ADMIN — DEXAMETHASONE 1.5 MG 6 MILLIGRAM(S): 1.5 TABLET ORAL at 05:39

## 2024-11-03 RX ADMIN — FLUTICASONE PROPIONATE AND SALMETEROL XINAFOATE 1 DOSE(S): 230; 21 AEROSOL, METERED RESPIRATORY (INHALATION) at 21:38

## 2024-11-03 RX ADMIN — Medication 100 MILLIGRAM(S): at 12:05

## 2024-11-03 NOTE — PATIENT PROFILE ADULT - SAFE PLACE TO LIVE
SW followed up with pt at NP visit. SW completed visit with pt, NP Kamini, and RN [employer] . Pt verbalized concerns about familial discord and requested to not start chemo today and instead tomorrow. SW provided psychosocial support. RN Navigator able to accommodate pt's request. Pt requested assistance with transportation to appointments as her daughter works and her sister is unable to drive her. SW stated she intends to coordinate and follow up with pt's daughter regarding  times. SW coordinated with Patient Relations to arrange transportation for pt for next chemo appointments. SW updated pt's daughter via phone of  times for pt appointments. No other needs at this time. SW intends to follow up. no

## 2024-11-03 NOTE — PHYSICAL THERAPY INITIAL EVALUATION ADULT - ADDITIONAL COMMENTS
Pt unable to provide history, very confused only oriented to self. As per chart pt came from [facility]. Ambulates with RW.

## 2024-11-03 NOTE — PROGRESS NOTE ADULT - ASSESSMENT
89F from NH with HO COPD, Former Smoker, Dementiawith history of COPD, former smoker and dementia  Admit Nov2 for worsening confusion/AMS, Hypoxemia, COVID 19 PNA s    COVID19  w/ mild hypoxemia  O2 Nasal canula  Remdesivier and dexamethasone     Sepsis  Zosyn and vanco started empirically in ED  DC pending BC results  ID consulted    Metabolic encephalopathy 2/2 covid pna    baseline verbal with confusion per Son  CT head with no acute intracranial pathology; multi-level degenerative  changes in c-spine  CT abdomen pelvis: diverticulosis gallstones, renal cyst, but no acute pathology   f/u ABG to look for CO2 rentention    COPD  not in acute exacerbation   albuterol prn and advair    lower leg pain   Bilateral dopplers unremarkable  fall precautions   PT consulted     Xerosis  Skin precautions. Gentle skin care with mild soaps  encourage po intake.    DVT ppx  lovenox    - generalized xerosis, skin precautions, gentle soapencourage PO feeds     89F from NH with HO COPD, Former Smoker, Dementiawith history of COPD, former smoker and dementia  Admit Nov2 for worsening confusion/AMS, Hypoxemia, COVID 19 PNA s    COVID19 w/ mild hypoxemia  O2 Nasal canula, wean of as able  no pneumonia on CT  Remdesivier 2/5 and dexamethasone 2/10    Sepsis  Zosyn and vanco started empirically in ED. currently off vanco, on zosyn  BC 1/2: coag neg staph. 2/2: GPC in clusters  DCed zosyn 11/3  ID consult called. awaiting rec    Metabolic encephalopathy 2/2 covid pna    baseline verbal with confusion per Son  CT head with no acute intracranial pathology; multi-level degenerative  changes in c-spine  CT abdomen pelvis: diverticulosis gallstones, renal cyst, but no acute pathology   ABG: mild hypercapnia    COPD  not in acute exacerbation   albuterol prn and advair    lower leg pain   Bilateral dopplers unremarkable  fall precautions   PT consult called    Xerosis  Skin precautions. Gentle skin care with mild soaps  encourage po intake.    DVT ppx  lovenox    GOC: DNR/DNI  Dispo: pending clinical course    called son Estevan. left VM to call back. 11/3

## 2024-11-03 NOTE — DIETITIAN INITIAL EVALUATION ADULT - PERTINENT MEDS FT
MEDICATIONS  (STANDING):  aspirin  chewable 81 milliGRAM(s) Oral daily  cholecalciferol 2000 Unit(s) Oral daily  dexAMETHasone  Injectable 6 milliGRAM(s) IV Push daily  enoxaparin Injectable 40 milliGRAM(s) SubCutaneous every 24 hours  fluticasone propionate/ salmeterol 100-50 MICROgram(s) Diskus 1 Dose(s) Inhalation two times a day  folic acid 1 milliGRAM(s) Oral daily  petrolatum white Ointment 1 Application(s) Topical two times a day  piperacillin/tazobactam IVPB.. 3.375 Gram(s) IV Intermittent every 8 hours  QUEtiapine 12.5 milliGRAM(s) Oral daily  remdesivir  IVPB   IV Intermittent   remdesivir  IVPB 100 milliGRAM(s) IV Intermittent every 24 hours  sodium chloride 0.9%. 1000 milliLiter(s) (75 mL/Hr) IV Continuous <Continuous>  thiamine 100 milliGRAM(s) Oral daily    MEDICATIONS  (PRN):  acetaminophen     Tablet .. 650 milliGRAM(s) Oral every 6 hours PRN Temp greater or equal to 38C (100.4F), Mild Pain (1 - 3)  aluminum hydroxide/magnesium hydroxide/simethicone Suspension 30 milliLiter(s) Oral every 4 hours PRN Dyspepsia  ondansetron Injectable 4 milliGRAM(s) IV Push every 8 hours PRN Nausea and/or Vomiting

## 2024-11-03 NOTE — PATIENT PROFILE ADULT - FALL HARM RISK - HARM RISK INTERVENTIONS

## 2024-11-03 NOTE — DIETITIAN NUTRITION RISK NOTIFICATION - PHYSICAL ASSESSMENT ORBITAL
D/I/A:  Patient is tolerating liquids and foods, ambulating, urinating, puncture site is stable (no bleeding and no hematoma) and patient has a . A&Ox4 and making needs known. CMS intact.  VSSA. NSR on monitor.  IV access removed.  Education completed and outlined in AVS or handout: medications reviewed with patient.  Questions answered prior to discharge.  Belongings returned to patient at discharge.    P: Discharged to self care.  Patient to follow up with appts as per discharge instruction.  
D/I/A: Pt roomed on 3C in bay [address].  Arrived via litter and accompanied by CCL nurse. VSS.  Rhythm upon arrival NSR on monitor.  Denies pain or SOB.  Reviewed activity restrictions and when to notify RN, (bleeding, changes to breathing or increased chest pressure or chest pain).  CCL access:  Right radial with TR band and 13 ml air to begin deflating at 1800.  P: Continue to monitor status.  Discharge to home once meeting criteria.    
Pt prepped for CORS.PIV placed and NS started.Groins shaved and prepped and pulses marked and charted.IV contrast discharge instructions reviewed and signed and a copy given to pt.Consent signed and questions answered.  
severe

## 2024-11-03 NOTE — DIETITIAN INITIAL EVALUATION ADULT - NSFNSPHYEXAMSKINFT_GEN_A_CORE
Pressure Injury 1: Left:, heel, Suspected deep tissue injury  Pressure Injury 2: none, none  Pressure Injury 3: none, none  Pressure Injury 4: none, none  Pressure Injury 5: none, none  Pressure Injury 6: none, none  Pressure Injury 7: none, none  Pressure Injury 8: none, none  Pressure Injury 9: none, none  Pressure Injury 10: none, none  Pressure Injury 11: none, none

## 2024-11-03 NOTE — PROGRESS NOTE ADULT - SUBJECTIVE AND OBJECTIVE BOX
Medicine Progress Note    Patient is a 89y old  Female who presents with a chief complaint of COVID PNA (02 Nov 2024 09:01)      SUBJECTIVE / OVERNIGHT EVENTS:    ADDITIONAL REVIEW OF SYSTEMS:    MEDICATIONS  (STANDING):  aspirin  chewable 81 milliGRAM(s) Oral daily  cholecalciferol 2000 Unit(s) Oral daily  dexAMETHasone  Injectable 6 milliGRAM(s) IV Push daily  enoxaparin Injectable 40 milliGRAM(s) SubCutaneous every 24 hours  fluticasone propionate/ salmeterol 100-50 MICROgram(s) Diskus 1 Dose(s) Inhalation two times a day  folic acid 1 milliGRAM(s) Oral daily  petrolatum white Ointment 1 Application(s) Topical two times a day  piperacillin/tazobactam IVPB.. 3.375 Gram(s) IV Intermittent every 8 hours  QUEtiapine 12.5 milliGRAM(s) Oral daily  remdesivir  IVPB   IV Intermittent   remdesivir  IVPB 100 milliGRAM(s) IV Intermittent every 24 hours  sodium chloride 0.9%. 1000 milliLiter(s) (75 mL/Hr) IV Continuous <Continuous>  thiamine 100 milliGRAM(s) Oral daily    MEDICATIONS  (PRN):  acetaminophen     Tablet .. 650 milliGRAM(s) Oral every 6 hours PRN Temp greater or equal to 38C (100.4F), Mild Pain (1 - 3)  aluminum hydroxide/magnesium hydroxide/simethicone Suspension 30 milliLiter(s) Oral every 4 hours PRN Dyspepsia  ondansetron Injectable 4 milliGRAM(s) IV Push every 8 hours PRN Nausea and/or Vomiting    CAPILLARY BLOOD GLUCOSE        I&O's Summary    02 Nov 2024 08:01  -  03 Nov 2024 07:00  --------------------------------------------------------  IN: 100 mL / OUT: 500 mL / NET: -400 mL        PHYSICAL EXAM:  Vital Signs Last 24 Hrs  T(C): 36.8 (03 Nov 2024 05:45), Max: 36.9 (02 Nov 2024 23:45)  T(F): 98.3 (03 Nov 2024 05:45), Max: 98.4 (02 Nov 2024 23:45)  HR: 83 (03 Nov 2024 05:45) (83 - 85)  BP: 157/90 (03 Nov 2024 05:45) (147/83 - 157/90)  BP(mean): --  RR: 18 (03 Nov 2024 05:45) (16 - 19)  SpO2: 94% (03 Nov 2024 05:45) (93% - 94%)    Parameters below as of 03 Nov 2024 05:45  Patient On (Oxygen Delivery Method): nasal cannula  O2 Flow (L/min): 3        LABS:                        13.3   7.24  )-----------( 232      ( 02 Nov 2024 07:17 )             40.7     11-02    140  |  106  |  14  ----------------------------<  91  3.9   |  26  |  0.71    Ca    9.0      02 Nov 2024 07:17  Mg     2.1     11-01    TPro  6.6  /  Alb  2.8[L]  /  TBili  0.4  /  DBili  0.0  /  AST  20  /  ALT  12  /  AlkPhos  103  11-02    PT/INR - ( 03 Nov 2024 05:35 )   PT: 11.6 sec;   INR: 0.98 ratio         PTT - ( 01 Nov 2024 18:00 )  PTT:26.8 sec      Urinalysis Basic - ( 02 Nov 2024 07:17 )    Color: x / Appearance: x / SG: x / pH: x  Gluc: 91 mg/dL / Ketone: x  / Bili: x / Urobili: x   Blood: x / Protein: x / Nitrite: x   Leuk Esterase: x / RBC: x / WBC x   Sq Epi: x / Non Sq Epi: x / Bacteria: x        Culture - Blood (collected 01 Nov 2024 18:00)  Source: .Blood BLOOD  Gram Stain (02 Nov 2024 18:01):    Growth in aerobic and anaerobic bottles: Gram Positive Cocci in Clusters  Preliminary Report (02 Nov 2024 18:01):    Growth in aerobic and anaerobic bottles: Gram Positive Cocci in Clusters    Culture - Blood (collected 01 Nov 2024 18:00)  Source: .Blood BLOOD  Gram Stain (02 Nov 2024 17:07):    Growth in aerobic bottle: Gram positive cocci in pairs  Organism: Blood Culture PCR (02 Nov 2024 18:01)  Organism: Blood Culture PCR (02 Nov 2024 18:01)          RADIOLOGY & ADDITIONAL TESTS:  Imaging from Last 24 Hours:    Electrocardiogram/QTc Interval:    COORDINATION OF CARE:  Care Discussed with Consultants/Other Providers:   Medicine Progress Note    Patient is a 89y old  Female who presents with a chief complaint of COVID PNA (02 Nov 2024 09:01)      SUBJECTIVE / OVERNIGHT EVENTS:  seen and examined  Chart reviewed  no overnight events    no complaints  feeling better.  SOB, Cough improving  on O2  confused      ROS:  denied fever/chills/CP/SOB/cough/palpitation/dizziness/abdominal pian/nausea/vomiting/diarrhoea/constipation/dysuria/leg or calf pain/headaches.all other ROS neg    MEDICATIONS  (STANDING):  aspirin  chewable 81 milliGRAM(s) Oral daily  cholecalciferol 2000 Unit(s) Oral daily  dexAMETHasone  Injectable 6 milliGRAM(s) IV Push daily  enoxaparin Injectable 40 milliGRAM(s) SubCutaneous every 24 hours  fluticasone propionate/ salmeterol 100-50 MICROgram(s) Diskus 1 Dose(s) Inhalation two times a day  folic acid 1 milliGRAM(s) Oral daily  petrolatum white Ointment 1 Application(s) Topical two times a day  piperacillin/tazobactam IVPB.. 3.375 Gram(s) IV Intermittent every 8 hours  QUEtiapine 12.5 milliGRAM(s) Oral daily  remdesivir  IVPB   IV Intermittent   remdesivir  IVPB 100 milliGRAM(s) IV Intermittent every 24 hours  sodium chloride 0.9%. 1000 milliLiter(s) (75 mL/Hr) IV Continuous <Continuous>  thiamine 100 milliGRAM(s) Oral daily    MEDICATIONS  (PRN):  acetaminophen     Tablet .. 650 milliGRAM(s) Oral every 6 hours PRN Temp greater or equal to 38C (100.4F), Mild Pain (1 - 3)  aluminum hydroxide/magnesium hydroxide/simethicone Suspension 30 milliLiter(s) Oral every 4 hours PRN Dyspepsia  ondansetron Injectable 4 milliGRAM(s) IV Push every 8 hours PRN Nausea and/or Vomiting    CAPILLARY BLOOD GLUCOSE        I&O's Summary    02 Nov 2024 08:01  -  03 Nov 2024 07:00  --------------------------------------------------------  IN: 100 mL / OUT: 500 mL / NET: -400 mL        PHYSICAL EXAM:    Vital Signs Last 24 Hrs  T(C): 36.8 (03 Nov 2024 05:45), Max: 36.9 (02 Nov 2024 23:45)  T(F): 98.3 (03 Nov 2024 05:45), Max: 98.4 (02 Nov 2024 23:45)  HR: 83 (03 Nov 2024 05:45) (83 - 85)  BP: 157/90 (03 Nov 2024 05:45) (147/83 - 157/90)  BP(mean): --  RR: 18 (03 Nov 2024 05:45) (16 - 19)  SpO2: 94% (03 Nov 2024 05:45) (93% - 94%)    Parameters below as of 03 Nov 2024 05:45  Patient On (Oxygen Delivery Method): nasal cannula  O2 Flow (L/min): 3    GENERAL: Not in distress. Alert    HEENT: AT/NC. clear conjuctiva, MMM.   no pallor or icterus  CARDIOVASCULAR: RRR S1, S2. soft SM. no rubs/gallop  LUNGS: BLAE+, no rales, no wheezing, no rhonchi.    ABDOMEN: ND. Soft,  NT, no guarding / rebound / rigidity. BS normoactive. No CVA tenderness.    BACK: No spine tenderness.  EXTREMITIES: no edema. no leg or calf TP.  SKIN: no rash.  NEUROLOGIC: AAO*3.strength is symmetric, sensation intact, speech fluent.    PSYCHIATRIC: Calm.  No agitation.        LABS:                        13.3   7.24  )-----------( 232      ( 02 Nov 2024 07:17 )             40.7     11-02    140  |  106  |  14  ----------------------------<  91  3.9   |  26  |  0.71    Ca    9.0      02 Nov 2024 07:17  Mg     2.1     11-01    TPro  6.6  /  Alb  2.8[L]  /  TBili  0.4  /  DBili  0.0  /  AST  20  /  ALT  12  /  AlkPhos  103  11-02    PT/INR - ( 03 Nov 2024 05:35 )   PT: 11.6 sec;   INR: 0.98 ratio         PTT - ( 01 Nov 2024 18:00 )  PTT:26.8 sec      Urinalysis Basic - ( 02 Nov 2024 07:17 )    Color: x / Appearance: x / SG: x / pH: x  Gluc: 91 mg/dL / Ketone: x  / Bili: x / Urobili: x   Blood: x / Protein: x / Nitrite: x   Leuk Esterase: x / RBC: x / WBC x   Sq Epi: x / Non Sq Epi: x / Bacteria: x        Culture - Blood (collected 01 Nov 2024 18:00)  Source: .Blood BLOOD  Gram Stain (02 Nov 2024 18:01):    Growth in aerobic and anaerobic bottles: Gram Positive Cocci in Clusters  Preliminary Report (02 Nov 2024 18:01):    Growth in aerobic and anaerobic bottles: Gram Positive Cocci in Clusters    Culture - Blood (collected 01 Nov 2024 18:00)  Source: .Blood BLOOD  Gram Stain (02 Nov 2024 17:07):    Growth in aerobic bottle: Gram positive cocci in pairs  Organism: Blood Culture PCR (02 Nov 2024 18:01)  Organism: Blood Culture PCR (02 Nov 2024 18:01)          RADIOLOGY & ADDITIONAL TESTS:  Imaging from Last 24 Hours:    Electrocardiogram/QTc Interval:    COORDINATION OF CARE:  Care Discussed with Consultants/Other Providers:

## 2024-11-03 NOTE — DIETITIAN INITIAL EVALUATION ADULT - ADD RECOMMEND
1. Continue Current Nutrition Care Regimen at This Time.   2. Recommended Ensure Plus High Protein 8oz PO BID (Provides 700kcal & 40grams of Protein)

## 2024-11-03 NOTE — PHYSICAL THERAPY INITIAL EVALUATION ADULT - GAIT TRAINING, PT EVAL
In 1-3 therapy sessions pt will be able to ambulate 20 ft with mod A and with appropriate assistive device.

## 2024-11-03 NOTE — PHYSICAL THERAPY INITIAL EVALUATION ADULT - PERTINENT HX OF CURRENT PROBLEM, REHAB EVAL
89 year-old-female with history of COPD, former smoker,  and dementia sent in from [facility] presenting with altered mental status and listlessness today.  Was found febrile at triage. EMS reported prior fall unclear etiology and nursing home reported x-rays were performed but no results were found with the patient. Patient is minimally verbal, ros unable to be obtained.

## 2024-11-03 NOTE — DIETITIAN INITIAL EVALUATION ADULT - PERTINENT LABORATORY DATA
11-03    x   |  x   |  x   ----------------------------<  x   x    |  x   |  0.63    Ca    9.0      02 Nov 2024 07:17  Mg     2.1     11-01    TPro  6.5  /  Alb  2.8[L]  /  TBili  0.3  /  DBili  0.0  /  AST  22  /  ALT  13  /  AlkPhos  97  11-03

## 2024-11-03 NOTE — DIETITIAN INITIAL EVALUATION ADULT - ORAL INTAKE PTA/DIET HISTORY
Pt with AMS and unable to report accurate diet hx. Pt with varied po intakes at this time. % reported as per nursing flow sheets.

## 2024-11-04 LAB
-  CLINDAMYCIN: SIGNIFICANT CHANGE UP
-  ERYTHROMYCIN: SIGNIFICANT CHANGE UP
-  GENTAMICIN: SIGNIFICANT CHANGE UP
-  OXACILLIN: SIGNIFICANT CHANGE UP
-  PENICILLIN: SIGNIFICANT CHANGE UP
-  RIFAMPIN: SIGNIFICANT CHANGE UP
-  TETRACYCLINE: SIGNIFICANT CHANGE UP
-  TRIMETHOPRIM/SULFAMETHOXAZOLE: SIGNIFICANT CHANGE UP
-  VANCOMYCIN: SIGNIFICANT CHANGE UP
ALBUMIN SERPL ELPH-MCNC: 2.8 G/DL — LOW (ref 3.3–5)
ALP SERPL-CCNC: 91 U/L — SIGNIFICANT CHANGE UP (ref 40–120)
ALT FLD-CCNC: 11 U/L — SIGNIFICANT CHANGE UP (ref 10–45)
ANION GAP SERPL CALC-SCNC: 7 MMOL/L — SIGNIFICANT CHANGE UP (ref 5–17)
AST SERPL-CCNC: 22 U/L — SIGNIFICANT CHANGE UP (ref 10–40)
BASOPHILS # BLD AUTO: 0.04 K/UL — SIGNIFICANT CHANGE UP (ref 0–0.2)
BASOPHILS NFR BLD AUTO: 0.5 % — SIGNIFICANT CHANGE UP (ref 0–2)
BILIRUB DIRECT SERPL-MCNC: 0.1 MG/DL — SIGNIFICANT CHANGE UP (ref 0–0.3)
BILIRUB INDIRECT FLD-MCNC: 0.2 MG/DL — SIGNIFICANT CHANGE UP (ref 0.2–1)
BILIRUB SERPL-MCNC: 0.3 MG/DL — SIGNIFICANT CHANGE UP (ref 0.2–1.2)
BUN SERPL-MCNC: 26 MG/DL — HIGH (ref 7–23)
CALCIUM SERPL-MCNC: 8.7 MG/DL — SIGNIFICANT CHANGE UP (ref 8.4–10.5)
CHLORIDE SERPL-SCNC: 103 MMOL/L — SIGNIFICANT CHANGE UP (ref 96–108)
CO2 SERPL-SCNC: 27 MMOL/L — SIGNIFICANT CHANGE UP (ref 22–31)
CREAT SERPL-MCNC: 0.72 MG/DL — SIGNIFICANT CHANGE UP (ref 0.5–1.3)
CREAT SERPL-MCNC: 0.93 MG/DL — SIGNIFICANT CHANGE UP (ref 0.5–1.3)
CRP SERPL-MCNC: 17 MG/L — HIGH
CULTURE RESULTS: ABNORMAL
D DIMER BLD IA.RAPID-MCNC: 2972 NG/ML DDU — HIGH
EGFR: 59 ML/MIN/1.73M2 — LOW
EGFR: 79 ML/MIN/1.73M2 — SIGNIFICANT CHANGE UP
EOSINOPHIL # BLD AUTO: 0.01 K/UL — SIGNIFICANT CHANGE UP (ref 0–0.5)
EOSINOPHIL NFR BLD AUTO: 0.1 % — SIGNIFICANT CHANGE UP (ref 0–6)
FERRITIN SERPL-MCNC: 144 NG/ML — SIGNIFICANT CHANGE UP (ref 13–330)
GLUCOSE SERPL-MCNC: 141 MG/DL — HIGH (ref 70–99)
HCT VFR BLD CALC: 38.8 % — SIGNIFICANT CHANGE UP (ref 34.5–45)
HGB BLD-MCNC: 12.4 G/DL — SIGNIFICANT CHANGE UP (ref 11.5–15.5)
IMM GRANULOCYTES NFR BLD AUTO: 0.3 % — SIGNIFICANT CHANGE UP (ref 0–0.9)
INR BLD: 1.07 RATIO — SIGNIFICANT CHANGE UP (ref 0.85–1.16)
LDH SERPL L TO P-CCNC: 238 U/L — SIGNIFICANT CHANGE UP (ref 50–242)
LYMPHOCYTES # BLD AUTO: 0.76 K/UL — LOW (ref 1–3.3)
LYMPHOCYTES # BLD AUTO: 10.4 % — LOW (ref 13–44)
MAGNESIUM SERPL-MCNC: 2 MG/DL — SIGNIFICANT CHANGE UP (ref 1.6–2.6)
MCHC RBC-ENTMCNC: 30.5 PG — SIGNIFICANT CHANGE UP (ref 27–34)
MCHC RBC-ENTMCNC: 32 G/DL — SIGNIFICANT CHANGE UP (ref 32–36)
MCV RBC AUTO: 95.3 FL — SIGNIFICANT CHANGE UP (ref 80–100)
METHOD TYPE: SIGNIFICANT CHANGE UP
MONOCYTES # BLD AUTO: 0.3 K/UL — SIGNIFICANT CHANGE UP (ref 0–0.9)
MONOCYTES NFR BLD AUTO: 4.1 % — SIGNIFICANT CHANGE UP (ref 2–14)
NEUTROPHILS # BLD AUTO: 6.17 K/UL — SIGNIFICANT CHANGE UP (ref 1.8–7.4)
NEUTROPHILS NFR BLD AUTO: 84.6 % — HIGH (ref 43–77)
NRBC # BLD: 0 /100 WBCS — SIGNIFICANT CHANGE UP (ref 0–0)
NT-PROBNP SERPL-SCNC: 150 PG/ML — SIGNIFICANT CHANGE UP (ref 0–300)
ORGANISM # SPEC MICROSCOPIC CNT: ABNORMAL
ORGANISM # SPEC MICROSCOPIC CNT: SIGNIFICANT CHANGE UP
PLATELET # BLD AUTO: 271 K/UL — SIGNIFICANT CHANGE UP (ref 150–400)
POTASSIUM SERPL-MCNC: 3.9 MMOL/L — SIGNIFICANT CHANGE UP (ref 3.5–5.3)
POTASSIUM SERPL-SCNC: 3.9 MMOL/L — SIGNIFICANT CHANGE UP (ref 3.5–5.3)
PROT SERPL-MCNC: 6.5 G/DL — SIGNIFICANT CHANGE UP (ref 6–8.3)
PROTHROM AB SERPL-ACNC: 12.6 SEC — SIGNIFICANT CHANGE UP (ref 9.9–13.4)
RBC # BLD: 4.07 M/UL — SIGNIFICANT CHANGE UP (ref 3.8–5.2)
RBC # FLD: 14.2 % — SIGNIFICANT CHANGE UP (ref 10.3–14.5)
SODIUM SERPL-SCNC: 137 MMOL/L — SIGNIFICANT CHANGE UP (ref 135–145)
SPECIMEN SOURCE: SIGNIFICANT CHANGE UP
SPECIMEN SOURCE: SIGNIFICANT CHANGE UP
TROPONIN I, HIGH SENSITIVITY RESULT: 24.6 NG/L — SIGNIFICANT CHANGE UP
WBC # BLD: 7.37 K/UL — SIGNIFICANT CHANGE UP (ref 3.8–10.5)
WBC # FLD AUTO: 7.37 K/UL — SIGNIFICANT CHANGE UP (ref 3.8–10.5)

## 2024-11-04 PROCEDURE — 71275 CT ANGIOGRAPHY CHEST: CPT | Mod: 26

## 2024-11-04 PROCEDURE — 99233 SBSQ HOSP IP/OBS HIGH 50: CPT

## 2024-11-04 RX ORDER — ENOXAPARIN SODIUM 40MG/0.4ML
60 SYRINGE (ML) SUBCUTANEOUS EVERY 12 HOURS
Refills: 0 | Status: ACTIVE | OUTPATIENT
Start: 2024-11-04 | End: 2025-10-03

## 2024-11-04 RX ORDER — IPRATROPIUM BROMIDE AND ALBUTEROL SULFATE .5; 2.5 MG/3ML; MG/3ML
3 SOLUTION RESPIRATORY (INHALATION) EVERY 6 HOURS
Refills: 0 | Status: DISCONTINUED | OUTPATIENT
Start: 2024-11-04 | End: 2024-11-11

## 2024-11-04 RX ADMIN — Medication 3 MILLIGRAM(S): at 22:11

## 2024-11-04 RX ADMIN — QUETIAPINE FUMARATE 12.5 MILLIGRAM(S): 200 TABLET ORAL at 11:27

## 2024-11-04 RX ADMIN — Medication 2000 UNIT(S): at 11:27

## 2024-11-04 RX ADMIN — PETROLATUM 1 APPLICATION(S): 987.89 OINTMENT TOPICAL at 05:58

## 2024-11-04 RX ADMIN — Medication 81 MILLIGRAM(S): at 11:27

## 2024-11-04 RX ADMIN — PETROLATUM 1 APPLICATION(S): 987.89 OINTMENT TOPICAL at 18:41

## 2024-11-04 RX ADMIN — Medication 100 MILLIGRAM(S): at 11:28

## 2024-11-04 RX ADMIN — IPRATROPIUM BROMIDE AND ALBUTEROL SULFATE 3 MILLILITER(S): .5; 2.5 SOLUTION RESPIRATORY (INHALATION) at 21:02

## 2024-11-04 RX ADMIN — Medication 60 MILLIGRAM(S): at 17:45

## 2024-11-04 RX ADMIN — DEXAMETHASONE 1.5 MG 6 MILLIGRAM(S): 1.5 TABLET ORAL at 05:27

## 2024-11-04 RX ADMIN — FOLIC ACID 1 MILLIGRAM(S): 1 TABLET ORAL at 11:27

## 2024-11-04 RX ADMIN — FLUTICASONE PROPIONATE AND SALMETEROL XINAFOATE 1 DOSE(S): 230; 21 AEROSOL, METERED RESPIRATORY (INHALATION) at 10:00

## 2024-11-04 RX ADMIN — Medication 40 MILLIGRAM(S): at 05:27

## 2024-11-04 RX ADMIN — REMDESIVIR 200 MILLIGRAM(S): 100 INJECTION, POWDER, LYOPHILIZED, FOR SOLUTION INTRAVENOUS at 11:27

## 2024-11-04 NOTE — PROVIDER CONTACT NOTE (CRITICAL VALUE NOTIFICATION) - TEST AND RESULT REPORTED:
growth in aerobic bottle.  Staphalococus capitis.    growth in anaerobic bottle. gram positive cocci in pairs.
Bld Culture Anaerobic and Aerobic Gram positive coccyx in cluster

## 2024-11-04 NOTE — PROGRESS NOTE ADULT - ASSESSMENT
89F from NH with HO COPD, Former Smoker, Dementiawith history of COPD, former smoker and dementia  Admit Nov2 for worsening confusion/AMS, Hypoxemia, COVID 19 PNA s    COVID19 w/ mild hypoxemia  O2 Nasal canula, wean of as able  no pneumonia on CT  Remdesivier 2/5 and dexamethasone 2/10    Sepsis  Zosyn and vanco started empirically in ED. currently off vanco, on zosyn  BC 1/2: coag neg staph. 2/2: GPC in clusters  DCed zosyn 11/3  ID consult called. awaiting rec    Metabolic encephalopathy 2/2 covid pna    baseline verbal with confusion per Son  CT head with no acute intracranial pathology; multi-level degenerative  changes in c-spine  CT abdomen pelvis: diverticulosis gallstones, renal cyst, but no acute pathology   ABG: mild hypercapnia    COPD  not in acute exacerbation   albuterol prn and advair    lower leg pain   Bilateral dopplers unremarkable  fall precautions   PT consult called    Xerosis  Skin precautions. Gentle skin care with mild soaps  encourage po intake.    DVT ppx  lovenox    GOC: DNR/DNI  Dispo: pending clinical course    called son Estevan. left VM to call back. 11/3     89F from NH with HO COPD, Former Smoker, Dementiawith history of COPD, former smoker and dementia  Admit Nov2 for worsening confusion/AMS, Hypoxemia, COVID 19 PNA s    COVID19 w/ mild hypoxemia              O2 sat 88% on Ra. 94% with 2L. O2 Nasal canula, wean of as able              ddimer 2900 today. ordered CTA PE rprotocol               Duplex done on admission was neg for DVT  no pneumonia on CT w/o IC  Remdesivier 4/5 and dexamethasone 4/10    SIRS  Zosyn and vanco started empirically in ED. currently off vanco and zosyn  BC 1/2: coag neg staph. 2/2: GPC in clusters  Repeat BC pending. however per ID, can be dced home pending repeat BC as BC looks contamination  ID consult noted and appreciated    Metabolic encephalopathy 2/2 covid pna    baseline verbal with confusion per Son  CT head with no acute intracranial pathology; multi-level degenerative  changes in c-spine  CT abdomen pelvis: diverticulosis gallstones, renal cyst, but no acute pathology   ABG: mild hypercapnia    COPD  not in acute exacerbation   albuterol prn and advair    lower leg pain   Bilateral dopplers unremarkable  fall precautions   PT consult called    Xerosis  Skin precautions. Gentle skin care with mild soaps  encourage po intake.    DVT ppx  lovenox    GOC: DNR/DNI  Dispo: Pending clinical course. still hypoxic. CTA PE protocol is pending. on O2    will inform son after CTA PE result.     d/w Dr. Sierra [ ID] and IDR team

## 2024-11-04 NOTE — CONSULT NOTE ADULT - SUBJECTIVE AND OBJECTIVE BOX
HPI:   Patient is a 89y female with copd, dementia admitted a few days ago for altered ms, fever, found to have covid. She was initially hypoxic so placed on decadron with rdv. BC grew 3/4 staph capitus and 1/4 staph epi. she has no recent procedures or prosthetic devices. She had a fall recently but no details are available. She thinks she feels fine. She does not remember why she came to [facility]     REVIEW OF SYSTEMS:  All other review of systems negative (Comprehensive ROS)    PAST MEDICAL & SURGICAL HISTORY:  Asthma with COPD      Mild cognitive impairment      No significant past surgical history          Allergies    No Known Allergies    Intolerances        Antimicrobials Day #  :4/5  remdesivir  IVPB   IV Intermittent   remdesivir  IVPB 100 milliGRAM(s) IV Intermittent every 24 hours    Other Medications:  acetaminophen     Tablet .. 650 milliGRAM(s) Oral every 6 hours PRN  aluminum hydroxide/magnesium hydroxide/simethicone Suspension 30 milliLiter(s) Oral every 4 hours PRN  aspirin  chewable 81 milliGRAM(s) Oral daily  cholecalciferol 2000 Unit(s) Oral daily  dexAMETHasone  Injectable 6 milliGRAM(s) IV Push daily  enoxaparin Injectable 40 milliGRAM(s) SubCutaneous every 24 hours  fluticasone propionate/ salmeterol 100-50 MICROgram(s) Diskus 1 Dose(s) Inhalation two times a day  folic acid 1 milliGRAM(s) Oral daily  melatonin 3 milliGRAM(s) Oral at bedtime  ondansetron Injectable 4 milliGRAM(s) IV Push every 8 hours PRN  petrolatum white Ointment 1 Application(s) Topical two times a day  QUEtiapine 12.5 milliGRAM(s) Oral daily  thiamine 100 milliGRAM(s) Oral daily      FAMILY HISTORY:  No pertinent family history in first degree relatives        SOCIAL HISTORY:  Smoking: [ ]Yes [x ]No  ETOH: [ ]Yes [x ]No  Drug Use: [ ]Yes [x ]No   x[ ] Single[ ]    T(F): 98.1 (11-04-24 @ 13:53), Max: 98.3 (11-03-24 @ 20:02)  HR: 72 (11-04-24 @ 13:53)  BP: 112/73 (11-04-24 @ 13:53)  RR: 18 (11-04-24 @ 13:53)  SpO2: 94% (11-04-24 @ 13:53)  Wt(kg): --    PHYSICAL EXAM:  General: alert, no acute distress  Eyes:  anicteric, no conjunctival injection, no discharge  Oropharynx: no lesions or injection 	  Neck: supple, without adenopathy  Lungs: clear to auscultation  Heart: regular rate and rhythm; no murmur, rubs or gallops  Abdomen: soft, nondistended, nontender, without mass or organomegaly  Skin: no lesions  Extremities: no clubbing, cyanosis, or edema  Neurologic: alert, , moves all extremities  bruise over right flank  LAB RESULTS:                        12.4   7.37  )-----------( 271      ( 04 Nov 2024 08:47 )             38.8     11-04    137  |  103  |  26[H]  ----------------------------<  141[H]  3.9   |  27  |  0.93    Ca    8.7      04 Nov 2024 10:04  Mg     2.0     11-04    TPro  6.5  /  Alb  2.8[L]  /  TBili  0.3  /  DBili  0.1  /  AST  22  /  ALT  11  /  AlkPhos  91  11-04    LIVER FUNCTIONS - ( 04 Nov 2024 08:47 )  Alb: 2.8 g/dL / Pro: 6.5 g/dL / ALK PHOS: 91 U/L / ALT: 11 U/L / AST: 22 U/L / GGT: x           Urinalysis Basic - ( 04 Nov 2024 10:04 )    Color: x / Appearance: x / SG: x / pH: x  Gluc: 141 mg/dL / Ketone: x  / Bili: x / Urobili: x   Blood: x / Protein: x / Nitrite: x   Leuk Esterase: x / RBC: x / WBC x   Sq Epi: x / Non Sq Epi: x / Bacteria: x        MICROBIOLOGY:  RECENT CULTURES:  11-01 @ 22:40 Clean Catch Clean Catch (Midstream)     >100,000 CFU/ml Streptococcus agalactiae (Group B)  Group B streptococci are susceptible to ampicillin,  penicillin and cefazolin, but may be resistant to  erythromycin and clindamycin.      11-01 @ 18:00 .Blood BLOOD Staphylococcus capitis    Growth in anaerobic bottle: Staphylococcus epidermidis "Susceptibilities  not performed"  Growth in aerobic bottle: Staphylococcus capitis    Growth in aerobic and anaerobic bottles: Gram Positive Cocci in Clusters          RADIOLOGY REVIEWED:    < from: US Duplex Venous Lower Ext Complete, Bilateral (11.02.24 @ 00:04) >    ACC: [id number] EXAM:  US DPLX LWR EXT VEINS COMPL BI   ORDERED BY:    ERWIN ADAMS     PROCEDURE DATE:  11/02/2024          INTERPRETATION:  CLINICAL INFORMATION: Covid positive. Leg pain.   Shortness of breath.    COMPARISON: 7/5/2024.    TECHNIQUE: Duplex sonography of the BILATERAL LOWER extremity veins with   color and spectral Doppler, with and without compression.    FINDINGS:    RIGHT:  Normal compressibility of the RIGHT common femoral, femoral and popliteal   veins.  Doppler examination shows normal spontaneous and phasic flow.  Calf veins not evaluated as per COVID limited protocol.    LEFT:  Normal compressibility of the LEFT common femoral, femoral and popliteal   veins.  Doppler examination shows normal spontaneous and phasic flow.  Calf veins not evaluated as per COVID limited protocol.    IMPRESSION:  No evidence of deep venous thrombosis in either lower extremity.            --- End of Report ---            < end of copied text >              < from: CT Abdomen and Pelvis No Cont (11.01.24 @ 21:04) >    ACC: [id number] EXAM:  CT CHEST   ORDERED BY: NAOMY DUBON     PROCEDURE DATE:  11/01/2024          INTERPRETATION:  CLINICAL INFORMATION: Unwitnessed fall, fever    COMPARISON: Chest CT 5/11/22.    CONTRAST/COMPLICATIONS:  IV Contrast: NONE  Oral Contrast: NONE  Complications: None reported at time of study completion    PROCEDURE:  CT of the Chest, Abdomen and Pelvis was performed.  Sagittal and coronal reformats were performed.    FINDINGS:  CHEST:  LUNGS AND LARGE AIRWAYS: Patent central airways. Evaluation of the lungs   is limited by respiratory motion artifact. PLEURA: No pleural effusion.  VESSELS: Mild atherosclerotic calcification of the thoracic aorta and   coronary arteries.  HEART: Heart size is mildly enlarged. No pericardial effusion.  MEDIASTINUM AND FABIEN: No lymphadenopathy.  CHEST WALL AND LOWER NECK: Within normal limits.    ABDOMEN AND PELVIS:  Evaluation of the solid abdominal organs is limited without IV contrast.   Images of the kidneys are degraded by motion artifact.  LIVER: Within normal limits.  BILE DUCTS: Normal caliber.  GALLBLADDER: Distended with stones.  SPLEEN: Within normal limits.  PANCREAS: Within normal limits.  ADRENALS: Within normal limits.  KIDNEYS/URETERS: No perinephric stranding, renal edema,   hydroureteronephrosis, obstructing stone, or intrarenal stone. There is a   2.8 cm left renal cyst.    BLADDER: Mildly distended.  REPRODUCTIVE ORGANS: No pelvic masses.    BOWEL: Small hiatal hernia. No bowel obstruction. Appendix is   unremarkable. The colon is underdistended without significant fecal load.  PERITONEUM/RETROPERITONEUM: Within normal limits.  VESSELS: Aorta is not dilated. Mild to moderate atherosclerotic vascular   calcification.  LYMPH NODES: No lymphadenopathy.  ABDOMINAL WALL: Within normal limits.  BONES: The bones are diffusely osteopenic. Age-indeterminate mild   biconcave deformity of T12, new since CT of May 11, 2022.    IMPRESSION:    The unenhanced images are degraded by motion artifact.  No definite acute findings in the chest, abdomen, or pelvis.    Gallstones.  Diverticulosis.          --- End of Report ---              < end of copied text >    Impression:89y female with copd, dementia admitted a few days ago for altered ms, fever, found to have covid. She was initially hypoxic so placed on decadron with rdv. BC grew 3/4 staph capitus and 1/4 staph epi. she has no recent procedures or prosthetic devices. She had a fall recently but no details are available. She  thinks she feels fine. She does have persistent hypoxia with high d dimer and no gross infiltrate so must evaluate for PE. Positive bc is contaminant. Positive uc is asymptomatic bacteriuria but she did have enough abx anyway if she did have a component of cystitis  Recommendations:  1 more dya of rdv  continue decadron for another day  check ct angio  monitor off further antibiotics.

## 2024-11-05 ENCOUNTER — RESULT REVIEW (OUTPATIENT)
Age: 89
End: 2024-11-05

## 2024-11-05 LAB
A1C WITH ESTIMATED AVERAGE GLUCOSE RESULT: 5.5 % — SIGNIFICANT CHANGE UP (ref 4–5.6)
ESTIMATED AVERAGE GLUCOSE: 111 MG/DL — SIGNIFICANT CHANGE UP (ref 68–114)

## 2024-11-05 PROCEDURE — 93306 TTE W/DOPPLER COMPLETE: CPT | Mod: 26

## 2024-11-05 PROCEDURE — 99233 SBSQ HOSP IP/OBS HIGH 50: CPT

## 2024-11-05 RX ORDER — DEXAMETHASONE 1.5 MG 1.5 MG/1
6 TABLET ORAL DAILY
Refills: 0 | Status: DISCONTINUED | OUTPATIENT
Start: 2024-11-06 | End: 2024-11-09

## 2024-11-05 RX ADMIN — Medication 60 MILLIGRAM(S): at 18:18

## 2024-11-05 RX ADMIN — Medication 100 MILLIGRAM(S): at 11:08

## 2024-11-05 RX ADMIN — PETROLATUM 1 APPLICATION(S): 987.89 OINTMENT TOPICAL at 19:30

## 2024-11-05 RX ADMIN — REMDESIVIR 200 MILLIGRAM(S): 100 INJECTION, POWDER, LYOPHILIZED, FOR SOLUTION INTRAVENOUS at 11:51

## 2024-11-05 RX ADMIN — DEXAMETHASONE 1.5 MG 6 MILLIGRAM(S): 1.5 TABLET ORAL at 06:05

## 2024-11-05 RX ADMIN — IPRATROPIUM BROMIDE AND ALBUTEROL SULFATE 3 MILLILITER(S): .5; 2.5 SOLUTION RESPIRATORY (INHALATION) at 09:32

## 2024-11-05 RX ADMIN — FLUTICASONE PROPIONATE AND SALMETEROL XINAFOATE 1 DOSE(S): 230; 21 AEROSOL, METERED RESPIRATORY (INHALATION) at 09:32

## 2024-11-05 RX ADMIN — IPRATROPIUM BROMIDE AND ALBUTEROL SULFATE 3 MILLILITER(S): .5; 2.5 SOLUTION RESPIRATORY (INHALATION) at 21:33

## 2024-11-05 RX ADMIN — Medication 2000 UNIT(S): at 11:08

## 2024-11-05 RX ADMIN — QUETIAPINE FUMARATE 12.5 MILLIGRAM(S): 200 TABLET ORAL at 11:08

## 2024-11-05 RX ADMIN — FLUTICASONE PROPIONATE AND SALMETEROL XINAFOATE 1 DOSE(S): 230; 21 AEROSOL, METERED RESPIRATORY (INHALATION) at 21:34

## 2024-11-05 RX ADMIN — Medication 60 MILLIGRAM(S): at 06:36

## 2024-11-05 RX ADMIN — FOLIC ACID 1 MILLIGRAM(S): 1 TABLET ORAL at 11:08

## 2024-11-05 RX ADMIN — Medication 3 MILLIGRAM(S): at 22:44

## 2024-11-05 RX ADMIN — Medication 81 MILLIGRAM(S): at 11:08

## 2024-11-05 RX ADMIN — PETROLATUM 1 APPLICATION(S): 987.89 OINTMENT TOPICAL at 06:11

## 2024-11-05 NOTE — PROGRESS NOTE ADULT - SUBJECTIVE AND OBJECTIVE BOX
CC: f/u for Covid and hypoxia    Patient reports: she is withdrawn and appears comfortable on supplemental O2    REVIEW OF SYSTEMS:  All other review of systems negative (Comprehensive ROS):limited, ? mild cognitive deficit.    Antimicrobials Day #  :day 5/5  remdesivir  IVPB   IV Intermittent   remdesivir  IVPB 100 milliGRAM(s) IV Intermittent every 24 hours    Other Medications Reviewed  MEDICATIONS  (STANDING):  albuterol/ipratropium for Nebulization 3 milliLiter(s) Nebulizer every 6 hours  aspirin  chewable 81 milliGRAM(s) Oral daily  cholecalciferol 2000 Unit(s) Oral daily  dexAMETHasone  Injectable 6 milliGRAM(s) IV Push daily  enoxaparin Injectable 60 milliGRAM(s) SubCutaneous every 12 hours  fluticasone propionate/ salmeterol 100-50 MICROgram(s) Diskus 1 Dose(s) Inhalation two times a day  folic acid 1 milliGRAM(s) Oral daily  melatonin 3 milliGRAM(s) Oral at bedtime  petrolatum white Ointment 1 Application(s) Topical two times a day  QUEtiapine 12.5 milliGRAM(s) Oral daily  remdesivir  IVPB   IV Intermittent   remdesivir  IVPB 100 milliGRAM(s) IV Intermittent every 24 hours  thiamine 100 milliGRAM(s) Oral daily    T(F): 97.5 (11-05-24 @ 05:37), Max: 98.1 (11-04-24 @ 13:53)  HR: 62 (11-05-24 @ 05:37)  BP: 134/75 (11-05-24 @ 05:37)  RR: 17 (11-05-24 @ 05:37)  SpO2: 94% (11-05-24 @ 05:37)  Wt(kg): --    PHYSICAL EXAM:  General: alert, no acute distress  Eyes:  anicteric, no conjunctival injection, no discharge  Oropharynx: no lesions or injection 	  Neck: supple, without adenopathy  Lungs: clear to auscultation, distant BS  Heart: regular rate and rhythm; no murmur, rubs or gallops  Abdomen: soft, nondistended, nontender, without mass or organomegaly  Skin: no lesions  Extremities: no clubbing, cyanosis, or edema  Neurologic: alert, confused, , moves all extremities    LAB RESULTS:                        12.4   7.37  )-----------( 271      ( 04 Nov 2024 08:47 )             38.8     11-04    137  |  103  |  26[H]  ----------------------------<  141[H]  3.9   |  27  |  0.93    Ca    8.7      04 Nov 2024 10:04  Mg     2.0     11-04    TPro  6.5  /  Alb  2.8[L]  /  TBili  0.3  /  DBili  0.1  /  AST  22  /  ALT  11  /  AlkPhos  91  11-04    LIVER FUNCTIONS - ( 04 Nov 2024 08:47 )  Alb: 2.8 g/dL / Pro: 6.5 g/dL / ALK PHOS: 91 U/L / ALT: 11 U/L / AST: 22 U/L / GGT: x           Urinalysis Basic - ( 04 Nov 2024 10:04 )    Color: x / Appearance: x / SG: x / pH: x  Gluc: 141 mg/dL / Ketone: x  / Bili: x / Urobili: x   Blood: x / Protein: x / Nitrite: x   Leuk Esterase: x / RBC: x / WBC x   Sq Epi: x / Non Sq Epi: x / Bacteria: x      MICROBIOLOGY:  RECENT CULTURES:  11-01 @ 22:40 Clean Catch Clean Catch (Midstream)     >100,000 CFU/ml Streptococcus agalactiae (Group B)  Group B streptococci are susceptible to ampicillin,  penicillin and cefazolin, but may be resistant to  erythromycin and clindamycin.      11-01 @ 18:00 .Blood BLOOD Staphylococcus capitis    Growth in anaerobic bottle: Staphylococcus epidermidis "Susceptibilities  not performed"  Growth in aerobic bottle: Staphylococcus capitis    Growth in aerobic and anaerobic bottles: Gram Positive Cocci in Clusters        RADIOLOGY REVIEWED:  < from: CT Angio Chest PE Protocol w/ IV Cont (11.04.24 @ 16:07) >    IMPRESSION:  Bilateral segmental pulmonary emboli without evidence of cardiac strain.  Mild emphysema.    Findings discussed with Dr. Boateng on 11/04/2024 at 4:30 PM  with read   back.    --- End of Report ---

## 2024-11-05 NOTE — PROGRESS NOTE ADULT - ASSESSMENT
89F from NH with HO COPD, Former Smoker, Dementiawith history of COPD, former smoker and dementia  Admit Nov2 for worsening confusion/AMS, Hypoxemia, COVID 19 PNA s    COVID19   no pneumonia on CT w/o IC  Remdesivier 5/5 and dexamethasone 5/10. dced today as hypoxia could be related to PE, not Covid PNA as no PNA on CT  ID noted      Acute PE            - CTA shows b/l segmental PE, no RV strain            - BNP, Trop normal;            - TTE pending            - on lovenox FD. will transition to Eliquis on DC            - pulm cx called      Mild hypoxemia:               Improving              Likely due to PE              O2 sat 88% on Ra. 94% with 2L. O2 Nasal canula, wean of as able              ddimer 2900. CTA PE protocol               Duplex done on admission was neg for DVT    SIRS  Zosyn and vanco started empirically in ED. currently off vanco and zosyn  BC 1/2: coag neg staph. 2/2: GPC in clusters  Repeat BC pending. however per ID, can be dced home pending repeat BC as BC looks contamination  ID consult noted and appreciated    Metabolic encephalopathy 2/2 covid,hypoxia, PE: improving  baseline verbal with confusion per Son  CT head with no acute intracranial pathology; multi-level degenerative  changes in c-spine  CT abdomen pelvis: diverticulosis gallstones, renal cyst, but no acute pathology   ABG: mild hypercapnia    COPD  not in acute exacerbation   albuterol prn and advair    lower leg pain   Bilateral dopplers unremarkable  fall precautions   PT consulted--> JASON    Xerosis  Skin precautions. Gentle skin care with mild soaps  encourage po intake.    DVT ppx  lovenox    GOC: DNR/DNI    Dispo: anticipate DC in 24 -48 hrs pending JASON arrangement.  Sandee Barreto wants CM to call [facility] AZUL Ingram [phone] [phone] for JASON choices. AZUL was informed.     11/5: updated sandee Barreto.   d/w Dr. Montes [ pulm] and IDR team

## 2024-11-05 NOTE — CONSULT NOTE ADULT - SUBJECTIVE AND OBJECTIVE BOX
PULMONARY CONSULT  Location of Patient : [facility] [address] ([facility])  Attending requesting Consult:Emilia Boateng  Chief Complaint :     Reason For consult :      Initial HPI on admission:  HPI:  89 year old female Ex smoker with COPD, dementia who wassent in from [facility] facility presenting with altered mental status on 11/1/2024.  She was found febrile at triage. EMS reported prior fall unclear etiology and nursing home reported x-rays were performed but no results were found with the patient. Patient is minimally verbal, ros unable to be obtained.       Upon arrival vitals 127/74, HR 98, RR 18, Temp 100.8, SpO2 97% on 3L NC  EKG reviewed, sinus tachycardia, no ST elevations or t-wave inversions   Chest xray wet read with mild bilateral infiltrates   RVP showed + COVID  Hospital course complicated with PE   (01 Nov 2024 22:49)      BRIEF HOSPITAL COURSE: ***          PAST MEDICAL & SURGICAL HISTORY:  Asthma with COPD  Mild cognitive impairment  No significant past surgical history        Allergies    No Known Allergies    Intolerances      FAMILY HISTORY:  No pertinent family history in first degree relatives      Social history: Social History:  from [facility] (01 Nov 2024 22:49)       Smoking:     Drinking:     Drug use:    Review of Systems: as stated above    CONSTITUTIONAL: No fever, No chills, No fatigue  EYES: No eye pain, No visual disturbances, No discharge  ENMT:  No difficulty hearing, No tinnitus, No vertigo; No sinus or throat pain  NECK: No pain, No stiffness  RESPIRATORY: No Cough, No SOB, No Secretions  CARDIOVASCULAR: No chest pain, No palpitations, No dizziness, or No leg swelling  GASTROINTESTINAL: No abdominal or epigastric pain. No nausea, No vomiting, No hematemesis; No diarrhea, No constipation. No melena, No hematochezia.  GENITOURINARY: No dysuria, No frequency, No hematuria, No incontinence  NEUROLOGICAL: No headaches, No memory loss, No loss of strength, No numbness, No tremors  SKIN: No itching, No burning, No rashes, No lesions   MUSCULOSKELETAL: No joint pain or swelling; No muscle, back, No extremity pain  PSYCHIATRIC: No depression, No anxiety, No mood swings, No difficulty sleeping      Medications:  MEDICATIONS  (STANDING):  albuterol/ipratropium for Nebulization 3 milliLiter(s) Nebulizer every 6 hours  aspirin  chewable 81 milliGRAM(s) Oral daily  cholecalciferol 2000 Unit(s) Oral daily  dexAMETHasone  Injectable 6 milliGRAM(s) IV Push daily  enoxaparin Injectable 60 milliGRAM(s) SubCutaneous every 12 hours  fluticasone propionate/ salmeterol 100-50 MICROgram(s) Diskus 1 Dose(s) Inhalation two times a day  folic acid 1 milliGRAM(s) Oral daily  melatonin 3 milliGRAM(s) Oral at bedtime  petrolatum white Ointment 1 Application(s) Topical two times a day  QUEtiapine 12.5 milliGRAM(s) Oral daily  remdesivir  IVPB   IV Intermittent   remdesivir  IVPB 100 milliGRAM(s) IV Intermittent every 24 hours  thiamine 100 milliGRAM(s) Oral daily    MEDICATIONS  (PRN):  acetaminophen     Tablet .. 650 milliGRAM(s) Oral every 6 hours PRN Temp greater or equal to 38C (100.4F), Mild Pain (1 - 3)  aluminum hydroxide/magnesium hydroxide/simethicone Suspension 30 milliLiter(s) Oral every 4 hours PRN Dyspepsia  ondansetron Injectable 4 milliGRAM(s) IV Push every 8 hours PRN Nausea and/or Vomiting      Antibiotics History  piperacillin/tazobactam IVPB.. 3.375 Gram(s) IV Intermittent every 8 hours, 11-01-24 @ 23:31, Stop order after: 7 Days  piperacillin/tazobactam IVPB... 3.375 Gram(s) IV Intermittent once, 11-01-24 @ 17:50, Stop order after: 1 Doses  remdesivir  IVPB   IV Intermittent , 11-01-24 @ 23:32  remdesivir  IVPB 100 milliGRAM(s) IV Intermittent every 24 hours, 11-03-24 @ 11:47, Stop order after: 4 Doses  remdesivir  IVPB 200 milliGRAM(s) IV Intermittent every 24 hours, 11-01-24 @ 23:41, Stop order after: 1 Doses  vancomycin  IVPB 1000 milliGRAM(s) IV Intermittent once, 11-02-24 @ 17:49, Stop order after: 1 Doses  vancomycin  IVPB. 1000 milliGRAM(s) IV Intermittent once, 11-01-24 @ 17:50, Stop order after: 1 Doses      Heme Medications   aspirin  chewable 81 milliGRAM(s) Oral daily, 11-02-24 @ 04:50  enoxaparin Injectable 60 milliGRAM(s) SubCutaneous every 12 hours, 11-04-24 @ 16:34      GI Medications  aluminum hydroxide/magnesium hydroxide/simethicone Suspension 30 milliLiter(s) Oral every 4 hours, 11-01-24 @ 23:27, Routine PRN        Home Medications:  Last Order Reconciliation Date: 11-02-24 @ 04:57 (Admission Reconciliation)  aspirin 81 mg oral tablet, chewable: 1 tab(s) orally once a day (11-02-24 @ 04:56)  Breo Ellipta 100 mcg-25 mcg/inh inhalation powder: 1 puff(s) inhaled once a day (11-02-24 @ 04:56)  cholecalciferol oral tablet: 1 tab(s) orally once a day 2000 unit(s) orally once a day (11-02-24 @ 04:52)  folic acid 1 mg oral tablet: 1 tab(s) orally once a day (11-02-24 @ 04:52)  Seroquel 25 mg oral tablet: 0.5 tab(s) orally once a day (11-02-24 @ 04:52)  thiamine 100 mg oral tablet: 1 tab(s) orally once a day (11-02-24 @ 04:52)      LABS:                        12.4   7.37  )-----------( 271      ( 04 Nov 2024 08:47 )             38.8     11-04    137  |  103  |  26[H]  ----------------------------<  141[H]  3.9   |  27  |  0.93    Ca    8.7      04 Nov 2024 10:04  Mg     2.0     11-04    TPro  6.5  /  Alb  2.8[L]  /  TBili  0.3  /  DBili  0.1  /  AST  22  /  ALT  11  /  AlkPhos  91  11-04    HIT ab -- 11-04 @ 10:04  HIT ab EIA --  D Dimer -[id number]          PT/INR - ( 04 Nov 2024 08:47 )   PT: 12.6 sec;   INR: 1.07 ratio           Urinalysis Basic - ( 04 Nov 2024 10:04 )    Color: x / Appearance: x / SG: x / pH: x  Gluc: 141 mg/dL / Ketone: x  / Bili: x / Urobili: x   Blood: x / Protein: x / Nitrite: x   Leuk Esterase: x / RBC: x / WBC x   Sq Epi: x / Non Sq Epi: x / Bacteria: x              CULTURES: (if applicable)    Culture - Urine (collected 11-01-24 @ 22:40)  Source: Clean Catch Clean Catch (Midstream)  Final Report (11-04-24 @ 00:57):    >100,000 CFU/ml Streptococcus agalactiae (Group B)    Group B streptococci are susceptible to ampicillin,    penicillin and cefazolin, but may be resistant to    erythromycin and clindamycin.    Culture - Blood (collected 11-01-24 @ 18:00)  Source: .Blood BLOOD  Gram Stain (11-02-24 @ 18:01):    Growth in aerobic and anaerobic bottles: Gram Positive Cocci in Clusters  Final Report (11-04-24 @ 09:52):    Growth in anaerobic bottle: Staphylococcus epidermidis "Susceptibilities    not performed"    Growth in aerobic bottle: Staphylococcus capitis  Organism: Staphylococcus capitis (11-04-24 @ 09:52)  Organism: Staphylococcus capitis (11-04-24 @ 09:52)      Method Type: BEBETO      -  Clindamycin: S <=0.25      -  Erythromycin: S <=0.25      -  Gentamicin: S <=1 Should not be used as monotherapy      -  Oxacillin: R >2      -  Penicillin: R 2      -  Rifampin: S <=1 Should not be used as monotherapy      -  Tetracycline: S <=1      -  Trimethoprim/Sulfamethoxazole: S <=0.5/9.5      -  Vancomycin: S 1    Culture - Blood (collected 11-01-24 @ 18:00)  Source: .Blood BLOOD  Gram Stain (11-03-24 @ 14:20):    Growth in aerobic bottle: Gram positive cocci in pairs    Growth in anaerobic bottle: Gram positive cocci in pairs  Final Report (11-04-24 @ 13:53):    Growth in aerobic and anaerobic bottles: Staphylococcus capitis    "Susceptibilities not performed"    Direct identification is available within approximately 3-5    hours either by Blood Panel Multiplexed PCR or Direct    MALDI-TOF. Details: [email or web address]  Organism: Blood Culture PCR (11-03-24 @ 14:20)  Organism: Blood Culture PCR (11-03-24 @ 14:20)      Method Type: PCR      -  Coagulase negative Staphylococcus: Detec                 RADIOLOGY  CXR:      CT:  < from: CT Chest No Cont (11.01.24 @ 21:03) >    FINDINGS:  CHEST:  LUNGS AND LARGE AIRWAYS: Patent central airways. Evaluation of the lungs   is limited by respiratory motion artifact. PLEURA: No pleural effusion.  VESSELS: Mild atherosclerotic calcification of the thoracic aorta and   coronary arteries.  HEART: Heart size is mildly enlarged. No pericardial effusion.  MEDIASTINUM AND FABIEN: No lymphadenopathy.  CHEST WALL AND LOWER NECK: Within normal limits.    ABDOMEN AND PELVIS:  Evaluation of the solid abdominal organs is limited without IV contrast.   Images of the kidneys are degraded by motion artifact.  LIVER: Within normal limits.  BILE DUCTS: Normal caliber.  GALLBLADDER: Distended with stones.  SPLEEN: Within normal limits.  PANCREAS: Within normal limits.  ADRENALS: Within normal limits.  KIDNEYS/URETERS: No perinephric stranding, renal edema,   hydroureteronephrosis, obstructing stone, or intrarenal stone. There is a   2.8 cm left renal cyst.    BLADDER: Mildly distended.  REPRODUCTIVE ORGANS: No pelvic masses.    BOWEL: Small hiatal hernia. No bowel obstruction. Appendix is   unremarkable. The colon is underdistended without significant fecal load.  PERITONEUM/RETROPERITONEUM: Within normal limits.  VESSELS: Aorta is not dilated. Mild to moderate atherosclerotic vascular   calcification.  LYMPH NODES: No lymphadenopathy.  ABDOMINAL WALL: Within normal limits.  BONES: The bones are diffusely osteopenic. Age-indeterminate mild   biconcave deformity of T12, new since CT of May 11, 2022.    IMPRESSION:    The unenhanced images are degraded by motion artifact.  No definite acute findings in the chest, abdomen, or pelvis.    Gallstones.  Diverticulosis.      < end of copied text >    < from: CT Angio Chest PE Protocol w/ IV Cont (11.04.24 @ 16:07) >    ACC: [id number] EXAM:  CT ANGIO CHEST PULM [facility]   ORDERED BY: EMILIA BOATENG     PROCEDURE DATE:  11/04/2024          INTERPRETATION:  CLINICAL INFORMATION: 89-year-old female, Covid   positive, hypoxia, elevated d-dimer.    COMPARISON: CT scan 11/01/2024    CONTRAST/COMPLICATIONS:  IV Contrast: Omnipaque 350  70 cc administered   30 cc discarded  Oral Contrast: NONE  Complications: None reported at time of study completion    PROCEDURE:  CT Angiography of the Chest.  Sagittal and coronal reformats were performed as well as 3D (MIP)   reconstructions.    FINDINGS:    LUNGS AND LARGE AIRWAYS: Patent central airways. Mild emphysema. No focal   consolidation.  PLEURA: No pleural effusion.  VESSELS: Pulmonary emboli segmental branches leftupper lobe pulmonary   artery and right upper, middle, and lower lobe segmental branches.  HEART: Mild cardiomegaly. No pericardial effusion. No evidence cardiac   strain.  MEDIASTINUM AND FABIEN: No lymphadenopathy.  CHEST WALL AND LOWER NECK: Within normal limits.  VISUALIZED UPPER ABDOMEN: Left renal cyst.  BONES: Compression deformity T12 unchanged.    IMPRESSION:  Bilateral segmental pulmonary emboli without evidence of cardiac strain.  Mild emphysema.    Findings discussed with Dr. Boateng on 11/04/2024 at 4:30 PM  with read   back.    --- End of Report ---      < end of copied text >  ECHO:    US  < from: US Duplex Venous Lower Ext Complete, Bilateral (11.02.24 @ 00:04) >    IMPRESSION:  No evidence of deep venous thrombosis in either lower extremity.        < end of copied text >    VITALS:  T(C): 36.4 (11-05-24 @ 05:37), Max: 36.7 (11-04-24 @ 13:53)  T(F): 97.5 (11-05-24 @ 05:37), Max: 98.1 (11-04-24 @ 13:53)  HR: 62 (11-05-24 @ 05:37) (60 - 72)  BP: 134/75 (11-05-24 @ 05:37) (112/73 - 134/77)  BP(mean): --  ABP: --  ABP(mean): --  RR: 17 (11-05-24 @ 05:37) (16 - 18)  SpO2: 94% (11-05-24 @ 05:37) (94% - 95%)  CVP(mm Hg): --  CVP(cm H2O): --    Ins and Outs     11-04-24 @ 07:01  -  11-05-24 @ 07:00  --------------------------------------------------------  IN: 0 mL / OUT: 700 mL / NET: -700 mL                I&O's Detail    04 Nov 2024 07:01  -  05 Nov 2024 07:00  --------------------------------------------------------  IN:  Total IN: 0 mL    OUT:    Voided (mL): 700 mL  Total OUT: 700 mL    Total NET: -700 mL          Physical Examination:  GENERAL:               Alert, Oriented, No acute distress.    HEENT:                    Pupils equal, reactive to light.  Symmetric. No JVD, Moist MM  PULM:                     Bilateral air entry, Clear to auscultation bilaterally, no significant sputum production, No Rales, No Rhonchi, No Wheezing  CVS:                         S1, S2,  No Murmur  ABD:                        Soft, nondistended, nontender, normoactive bowel sounds,   EXT:                         No edema, nontender, No Cyanosis or Clubbing   Vascular:                Warm Extremities, Normal Capillary refill, Normal Distal Pulses  SKIN:                       Warm and well perfused, no rashes noted.   NEURO:                  Alert, oriented, interactive, nonfocal, follows commands  PSYC:                      Calm, + Insight.     PULMONARY CONSULT  Location of Patient : [facility] ([facility])  Attending requesting Consult:Emilia Boateng      Initial HPI on admission:  HPI:  89 year old female Ex smoker with COPD, dementia who was sent in from [facility] facility presenting with altered mental status on 11/1/2024.  She was found febrile at triage. EMS reported prior fall unclear etiology and nursing home reported x-rays were performed but no results were found with the patient. Patient is minimally verbal, ros unable to be obtained.       Upon arrival vitals 127/74, HR 98, RR 18, Temp 100.8, SpO2 97% on 3L NC  EKG reviewed, sinus tachycardia, no ST elevations or t-wave inversions   Chest xray wet read with mild bilateral infiltrates   RVP showed + COVID  Hospital course complicated with PE   (01 Nov 2024 22:49)    BRIEF HOSPITAL COURSE:   patient seen and examined  comfortble  no cp, sob, palp  confused unable to provide history or ros           PAST MEDICAL & SURGICAL HISTORY:  Asthma with COPD  Mild cognitive impairment  No significant past surgical history        Allergies    No Known Allergies    Intolerances    Due to current medical status patient unable to provide medical, social, family history.  History obtained from available medical record.       Medications:  MEDICATIONS  (STANDING):  albuterol/ipratropium for Nebulization 3 milliLiter(s) Nebulizer every 6 hours  aspirin  chewable 81 milliGRAM(s) Oral daily  cholecalciferol 2000 Unit(s) Oral daily  dexAMETHasone  Injectable 6 milliGRAM(s) IV Push daily  enoxaparin Injectable 60 milliGRAM(s) SubCutaneous every 12 hours  fluticasone propionate/ salmeterol 100-50 MICROgram(s) Diskus 1 Dose(s) Inhalation two times a day  folic acid 1 milliGRAM(s) Oral daily  melatonin 3 milliGRAM(s) Oral at bedtime  petrolatum white Ointment 1 Application(s) Topical two times a day  QUEtiapine 12.5 milliGRAM(s) Oral daily  remdesivir  IVPB   IV Intermittent   remdesivir  IVPB 100 milliGRAM(s) IV Intermittent every 24 hours  thiamine 100 milliGRAM(s) Oral daily    MEDICATIONS  (PRN):  acetaminophen     Tablet .. 650 milliGRAM(s) Oral every 6 hours PRN Temp greater or equal to 38C (100.4F), Mild Pain (1 - 3)  aluminum hydroxide/magnesium hydroxide/simethicone Suspension 30 milliLiter(s) Oral every 4 hours PRN Dyspepsia  ondansetron Injectable 4 milliGRAM(s) IV Push every 8 hours PRN Nausea and/or Vomiting      Antibiotics History  piperacillin/tazobactam IVPB.. 3.375 Gram(s) IV Intermittent every 8 hours, 11-01-24 @ 23:31, Stop order after: 7 Days  piperacillin/tazobactam IVPB... 3.375 Gram(s) IV Intermittent once, 11-01-24 @ 17:50, Stop order after: 1 Doses  remdesivir  IVPB   IV Intermittent , 11-01-24 @ 23:32  remdesivir  IVPB 100 milliGRAM(s) IV Intermittent every 24 hours, 11-03-24 @ 11:47, Stop order after: 4 Doses  remdesivir  IVPB 200 milliGRAM(s) IV Intermittent every 24 hours, 11-01-24 @ 23:41, Stop order after: 1 Doses  vancomycin  IVPB 1000 milliGRAM(s) IV Intermittent once, 11-02-24 @ 17:49, Stop order after: 1 Doses  vancomycin  IVPB. 1000 milliGRAM(s) IV Intermittent once, 11-01-24 @ 17:50, Stop order after: 1 Doses      Heme Medications   aspirin  chewable 81 milliGRAM(s) Oral daily, 11-02-24 @ 04:50  enoxaparin Injectable 60 milliGRAM(s) SubCutaneous every 12 hours, 11-04-24 @ 16:34      GI Medications  aluminum hydroxide/magnesium hydroxide/simethicone Suspension 30 milliLiter(s) Oral every 4 hours, 11-01-24 @ 23:27, Routine PRN        Home Medications:  Last Order Reconciliation Date: 11-02-24 @ 04:57 (Admission Reconciliation)  aspirin 81 mg oral tablet, chewable: 1 tab(s) orally once a day (11-02-24 @ 04:56)  Breo Ellipta 100 mcg-25 mcg/inh inhalation powder: 1 puff(s) inhaled once a day (11-02-24 @ 04:56)  cholecalciferol oral tablet: 1 tab(s) orally once a day 2000 unit(s) orally once a day (11-02-24 @ 04:52)  folic acid 1 mg oral tablet: 1 tab(s) orally once a day (11-02-24 @ 04:52)  Seroquel 25 mg oral tablet: 0.5 tab(s) orally once a day (11-02-24 @ 04:52)  thiamine 100 mg oral tablet: 1 tab(s) orally once a day (11-02-24 @ 04:52)      LABS:                        12.4   7.37  )-----------( 271      ( 04 Nov 2024 08:47 )             38.8     11-04    137  |  103  |  26[H]  ----------------------------<  141[H]  3.9   |  27  |  0.93    Ca    8.7      04 Nov 2024 10:04  Mg     2.0     11-04    TPro  6.5  /  Alb  2.8[L]  /  TBili  0.3  /  DBili  0.1  /  AST  22  /  ALT  11  /  AlkPhos  91  11-04    HIT ab -- 11-04 @ 10:04  HIT ab EIA --  D Dimer -[id number]          PT/INR - ( 04 Nov 2024 08:47 )   PT: 12.6 sec;   INR: 1.07 ratio           Urinalysis Basic - ( 04 Nov 2024 10:04 )    Color: x / Appearance: x / SG: x / pH: x  Gluc: 141 mg/dL / Ketone: x  / Bili: x / Urobili: x   Blood: x / Protein: x / Nitrite: x   Leuk Esterase: x / RBC: x / WBC x   Sq Epi: x / Non Sq Epi: x / Bacteria: x              CULTURES: (if applicable)    Culture - Urine (collected 11-01-24 @ 22:40)  Source: Clean Catch Clean Catch (Midstream)  Final Report (11-04-24 @ 00:57):    >100,000 CFU/ml Streptococcus agalactiae (Group B)    Group B streptococci are susceptible to ampicillin,    penicillin and cefazolin, but may be resistant to    erythromycin and clindamycin.    Culture - Blood (collected 11-01-24 @ 18:00)  Source: .Blood BLOOD  Gram Stain (11-02-24 @ 18:01):    Growth in aerobic and anaerobic bottles: Gram Positive Cocci in Clusters  Final Report (11-04-24 @ 09:52):    Growth in anaerobic bottle: Staphylococcus epidermidis "Susceptibilities    not performed"    Growth in aerobic bottle: Staphylococcus capitis  Organism: Staphylococcus capitis (11-04-24 @ 09:52)  Organism: Staphylococcus capitis (11-04-24 @ 09:52)      Method Type: BEBETO      -  Clindamycin: S <=0.25      -  Erythromycin: S <=0.25      -  Gentamicin: S <=1 Should not be used as monotherapy      -  Oxacillin: R >2      -  Penicillin: R 2      -  Rifampin: S <=1 Should not be used as monotherapy      -  Tetracycline: S <=1      -  Trimethoprim/Sulfamethoxazole: S <=0.5/9.5      -  Vancomycin: S 1    Culture - Blood (collected 11-01-24 @ 18:00)  Source: .Blood BLOOD  Gram Stain (11-03-24 @ 14:20):    Growth in aerobic bottle: Gram positive cocci in pairs    Growth in anaerobic bottle: Gram positive cocci in pairs  Final Report (11-04-24 @ 13:53):    Growth in aerobic and anaerobic bottles: Staphylococcus capitis    "Susceptibilities not performed"    Direct identification is available within approximately 3-5    hours either by Blood Panel Multiplexed PCR or Direct    MALDI-TOF. Details: [email or web address]  Organism: Blood Culture PCR (11-03-24 @ 14:20)  Organism: Blood Culture PCR (11-03-24 @ 14:20)      Method Type: PCR      -  Coagulase negative Staphylococcus: Detec                 RADIOLOGY  CXR:      CT:  < from: CT Chest No Cont (11.01.24 @ 21:03) >    FINDINGS:  CHEST:  LUNGS AND LARGE AIRWAYS: Patent central airways. Evaluation of the lungs   is limited by respiratory motion artifact. PLEURA: No pleural effusion.  VESSELS: Mild atherosclerotic calcification of the thoracic aorta and   coronary arteries.  HEART: Heart size is mildly enlarged. No pericardial effusion.  MEDIASTINUM AND FABIEN: No lymphadenopathy.  CHEST WALL AND LOWER NECK: Within normal limits.    ABDOMEN AND PELVIS:  Evaluation of the solid abdominal organs is limited without IV contrast.   Images of the kidneys are degraded by motion artifact.  LIVER: Within normal limits.  BILE DUCTS: Normal caliber.  GALLBLADDER: Distended with stones.  SPLEEN: Within normal limits.  PANCREAS: Within normal limits.  ADRENALS: Within normal limits.  KIDNEYS/URETERS: No perinephric stranding, renal edema,   hydroureteronephrosis, obstructing stone, or intrarenal stone. There is a   2.8 cm left renal cyst.    BLADDER: Mildly distended.  REPRODUCTIVE ORGANS: No pelvic masses.    BOWEL: Small hiatal hernia. No bowel obstruction. Appendix is   unremarkable. The colon is underdistended without significant fecal load.  PERITONEUM/RETROPERITONEUM: Within normal limits.  VESSELS: Aorta is not dilated. Mild to moderate atherosclerotic vascular   calcification.  LYMPH NODES: No lymphadenopathy.  ABDOMINAL WALL: Within normal limits.  BONES: The bones are diffusely osteopenic. Age-indeterminate mild   biconcave deformity of T12, new since CT of May 11, 2022.    IMPRESSION:    The unenhanced images are degraded by motion artifact.  No definite acute findings in the chest, abdomen, or pelvis.    Gallstones.  Diverticulosis.      < end of copied text >    < from: CT Angio Chest PE Protocol w/ IV Cont (11.04.24 @ 16:07) >    ACC: [id number] EXAM:  CT ANGIO CHEST PULM [facility]   ORDERED BY: EMILIA BOATENG     PROCEDURE DATE:  11/04/2024          INTERPRETATION:  CLINICAL INFORMATION: 89-year-old female, Covid   positive, hypoxia, elevated d-dimer.    COMPARISON: CT scan 11/01/2024    CONTRAST/COMPLICATIONS:  IV Contrast: Omnipaque 350  70 cc administered   30 cc discarded  Oral Contrast: NONE  Complications: None reported at time of study completion    PROCEDURE:  CT Angiography of the Chest.  Sagittal and coronal reformats were performed as well as 3D (MIP)   reconstructions.    FINDINGS:    LUNGS AND LARGE AIRWAYS: Patent central airways. Mild emphysema. No focal   consolidation.  PLEURA: No pleural effusion.  VESSELS: Pulmonary emboli segmental branches leftupper lobe pulmonary   artery and right upper, middle, and lower lobe segmental branches.  HEART: Mild cardiomegaly. No pericardial effusion. No evidence cardiac   strain.  MEDIASTINUM AND FABIEN: No lymphadenopathy.  CHEST WALL AND LOWER NECK: Within normal limits.  VISUALIZED UPPER ABDOMEN: Left renal cyst.  BONES: Compression deformity T12 unchanged.    IMPRESSION:  Bilateral segmental pulmonary emboli without evidence of cardiac strain.  Mild emphysema.    Findings discussed with Dr. Boateng on 11/04/2024 at 4:30 PM  with read   back.    --- End of Report ---      < end of copied text >  ECHO:    US  < from: US Duplex Venous Lower Ext Complete, Bilateral (11.02.24 @ 00:04) >    IMPRESSION:  No evidence of deep venous thrombosis in either lower extremity.        < end of copied text >    VITALS:  T(C): 36.4 (11-05-24 @ 05:37), Max: 36.7 (11-04-24 @ 13:53)  T(F): 97.5 (11-05-24 @ 05:37), Max: 98.1 (11-04-24 @ 13:53)  HR: 62 (11-05-24 @ 05:37) (60 - 72)  BP: 134/75 (11-05-24 @ 05:37) (112/73 - 134/77)  BP(mean): --  ABP: --  ABP(mean): --  RR: 17 (11-05-24 @ 05:37) (16 - 18)  SpO2: 94% (11-05-24 @ 05:37) (94% - 95%)  CVP(mm Hg): --  CVP(cm H2O): --    Ins and Outs     11-04-24 @ 07:01  -  11-05-24 @ 07:00  --------------------------------------------------------  IN: 0 mL / OUT: 700 mL / NET: -700 mL                I&O's Detail    04 Nov 2024 07:01  -  05 Nov 2024 07:00  --------------------------------------------------------  IN:  Total IN: 0 mL    OUT:    Voided (mL): 700 mL  Total OUT: 700 mL    Total NET: -700 mL          Physical Examination:  GENERAL:               Alert,  No acute distress.    HEENT:                  No JVD, Moist MM  PULM:                     Bilateral air entry, Clear to auscultation bilaterally diminished , no significant sputum production, No Rales, No Rhonchi, No Wheezing  CVS:                         S1, S2,  +Murmur  ABD:                        Soft, nondistended, nontender, normoactive bowel sounds,   EXT:                         No edema, nontender, No Cyanosis or Clubbing    NEURO:                  Alert, interactive, nonfocal, follows commands  PSYC:                      Calm, limited Insight.

## 2024-11-05 NOTE — PROGRESS NOTE ADULT - ASSESSMENT
88 yo female with COPD/asthma, dementia who was admitted with mental status changes and found to have Covid.  She was admitted 11/1, was febrile to 100.8 and found to have hypoxia- required supplemental O2  CTA and chest imaging without infiltrates, now known to have B/L subsegmental PE  She is on day 5/5 RDV. Mixed coag negative staph in blood = contaminents and GBS in urine likely colonizer  Suggest:  1 Limit RDV to 5 days  2 Anticoagulation as per primary service  3 With PE potentially contributing to hypoxia I would consider stopping decadron( No evidence of Covid pneumonia0  4 Supportive care

## 2024-11-05 NOTE — CONSULT NOTE ADULT - ASSESSMENT
Plan  1. Covid pneumonia  2. PE Provoked due to COVID  3. Chronic COPD  4. Dementia     Plan  Remdesivir  x 3-5 days   decadron  x 10 days   currently on Lovenox, can transition to NOAC 3-6 months  respiratory status appears stable today  off n/c o2 at this time  out patient pulm f/u when medically ready.

## 2024-11-05 NOTE — PROGRESS NOTE ADULT - SUBJECTIVE AND OBJECTIVE BOX
Medicine Progress Note    Patient is a 89y old  Female who presents with a chief complaint of COVID PNA (05 Nov 2024 11:44)      SUBJECTIVE / OVERNIGHT EVENTS:  seen and examined  Chart reviewed  no overnight events    no complaints  feeling better.  SOB, Cough improving  on O2  confused    ADDITIONAL REVIEW OF SYSTEMS:  denied CP/SOB/palpitation/dixiness/abd pain/headaches. did not answer further    MEDICATIONS  (STANDING):  albuterol/ipratropium for Nebulization 3 milliLiter(s) Nebulizer every 6 hours  aspirin  chewable 81 milliGRAM(s) Oral daily  cholecalciferol 2000 Unit(s) Oral daily  dexAMETHasone  Injectable 6 milliGRAM(s) IV Push daily  enoxaparin Injectable 60 milliGRAM(s) SubCutaneous every 12 hours  fluticasone propionate/ salmeterol 100-50 MICROgram(s) Diskus 1 Dose(s) Inhalation two times a day  folic acid 1 milliGRAM(s) Oral daily  melatonin 3 milliGRAM(s) Oral at bedtime  petrolatum white Ointment 1 Application(s) Topical two times a day  QUEtiapine 12.5 milliGRAM(s) Oral daily  remdesivir  IVPB 100 milliGRAM(s) IV Intermittent every 24 hours  remdesivir  IVPB   IV Intermittent   thiamine 100 milliGRAM(s) Oral daily    MEDICATIONS  (PRN):  acetaminophen     Tablet .. 650 milliGRAM(s) Oral every 6 hours PRN Temp greater or equal to 38C (100.4F), Mild Pain (1 - 3)  aluminum hydroxide/magnesium hydroxide/simethicone Suspension 30 milliLiter(s) Oral every 4 hours PRN Dyspepsia  ondansetron Injectable 4 milliGRAM(s) IV Push every 8 hours PRN Nausea and/or Vomiting    CAPILLARY BLOOD GLUCOSE        I&O's Summary    04 Nov 2024 07:01  -  05 Nov 2024 07:00  --------------------------------------------------------  IN: 0 mL / OUT: 700 mL / NET: -700 mL        PHYSICAL EXAM:  Vital Signs Last 24 Hrs  T(C): 36.4 (05 Nov 2024 05:37), Max: 36.7 (04 Nov 2024 13:53)  T(F): 97.5 (05 Nov 2024 05:37), Max: 98.1 (04 Nov 2024 13:53)  HR: 62 (05 Nov 2024 05:37) (60 - 72)  BP: 134/75 (05 Nov 2024 05:37) (112/73 - 134/77)  BP(mean): --  RR: 17 (05 Nov 2024 05:37) (16 - 18)  SpO2: 94% (05 Nov 2024 05:37) (94% - 95%)    Parameters below as of 05 Nov 2024 05:37  Patient On (Oxygen Delivery Method): room air      GENERAL: Not in distress. Alert    HEENT: AT/NC. clear conjuctiva, MMM.   no pallor or icterus  CARDIOVASCULAR: RRR S1, S2. soft SM. no rubs/gallop  LUNGS: BLAE+, no rales, no wheezing, no rhonchi.    ABDOMEN: ND. Soft,  NT, no guarding / rebound / rigidity. BS normoactive. No CVA tenderness.    BACK: No spine tenderness.  EXTREMITIES: no edema. no leg or calf TP.  SKIN: no rash.  NEUROLOGIC: AAO*3.strength is symmetric, sensation intact, speech fluent.    PSYCHIATRIC: Calm.  No agitation.    LABS:                        12.4   7.37  )-----------( 271      ( 04 Nov 2024 08:47 )             38.8     11-04    137  |  103  |  26[H]  ----------------------------<  141[H]  3.9   |  27  |  0.93    Ca    8.7      04 Nov 2024 10:04  Mg     2.0     11-04    TPro  6.5  /  Alb  2.8[L]  /  TBili  0.3  /  DBili  0.1  /  AST  22  /  ALT  11  /  AlkPhos  91  11-04    PT/INR - ( 04 Nov 2024 08:47 )   PT: 12.6 sec;   INR: 1.07 ratio               Urinalysis Basic - ( 04 Nov 2024 10:04 )    Color: x / Appearance: x / SG: x / pH: x  Gluc: 141 mg/dL / Ketone: x  / Bili: x / Urobili: x   Blood: x / Protein: x / Nitrite: x   Leuk Esterase: x / RBC: x / WBC x   Sq Epi: x / Non Sq Epi: x / Bacteria: x            RADIOLOGY & ADDITIONAL TESTS:  Imaging from Last 24 Hours:    Electrocardiogram/QTc Interval:    COORDINATION OF CARE:  Care Discussed with Consultants/Other Providers:

## 2024-11-06 LAB
ALBUMIN SERPL ELPH-MCNC: 2.8 G/DL — LOW (ref 3.3–5)
ALP SERPL-CCNC: 88 U/L — SIGNIFICANT CHANGE UP (ref 40–120)
ALT FLD-CCNC: 28 U/L — SIGNIFICANT CHANGE UP (ref 10–45)
AST SERPL-CCNC: 33 U/L — SIGNIFICANT CHANGE UP (ref 10–40)
BILIRUB DIRECT SERPL-MCNC: 0 MG/DL — SIGNIFICANT CHANGE UP (ref 0–0.3)
BILIRUB INDIRECT FLD-MCNC: 0.3 MG/DL — SIGNIFICANT CHANGE UP (ref 0.2–1)
BILIRUB SERPL-MCNC: 0.3 MG/DL — SIGNIFICANT CHANGE UP (ref 0.2–1.2)
CREAT SERPL-MCNC: 0.78 MG/DL — SIGNIFICANT CHANGE UP (ref 0.5–1.3)
EGFR: 72 ML/MIN/1.73M2 — SIGNIFICANT CHANGE UP
INR BLD: 1.14 RATIO — SIGNIFICANT CHANGE UP (ref 0.85–1.16)
PROT SERPL-MCNC: 6.3 G/DL — SIGNIFICANT CHANGE UP (ref 6–8.3)
PROTHROM AB SERPL-ACNC: 13.4 SEC — SIGNIFICANT CHANGE UP (ref 9.9–13.4)

## 2024-11-06 PROCEDURE — 99232 SBSQ HOSP IP/OBS MODERATE 35: CPT

## 2024-11-06 RX ADMIN — Medication 2000 UNIT(S): at 13:18

## 2024-11-06 RX ADMIN — Medication 60 MILLIGRAM(S): at 06:22

## 2024-11-06 RX ADMIN — FLUTICASONE PROPIONATE AND SALMETEROL XINAFOATE 1 DOSE(S): 230; 21 AEROSOL, METERED RESPIRATORY (INHALATION) at 08:37

## 2024-11-06 RX ADMIN — IPRATROPIUM BROMIDE AND ALBUTEROL SULFATE 3 MILLILITER(S): .5; 2.5 SOLUTION RESPIRATORY (INHALATION) at 04:47

## 2024-11-06 RX ADMIN — Medication 100 MILLIGRAM(S): at 13:18

## 2024-11-06 RX ADMIN — DEXAMETHASONE 1.5 MG 6 MILLIGRAM(S): 1.5 TABLET ORAL at 06:23

## 2024-11-06 RX ADMIN — QUETIAPINE FUMARATE 12.5 MILLIGRAM(S): 200 TABLET ORAL at 13:18

## 2024-11-06 RX ADMIN — PETROLATUM 1 APPLICATION(S): 987.89 OINTMENT TOPICAL at 06:23

## 2024-11-06 RX ADMIN — Medication 60 MILLIGRAM(S): at 17:36

## 2024-11-06 RX ADMIN — FOLIC ACID 1 MILLIGRAM(S): 1 TABLET ORAL at 13:18

## 2024-11-06 RX ADMIN — PETROLATUM 1 APPLICATION(S): 987.89 OINTMENT TOPICAL at 17:53

## 2024-11-06 RX ADMIN — Medication 81 MILLIGRAM(S): at 13:19

## 2024-11-06 RX ADMIN — FLUTICASONE PROPIONATE AND SALMETEROL XINAFOATE 1 DOSE(S): 230; 21 AEROSOL, METERED RESPIRATORY (INHALATION) at 21:37

## 2024-11-06 RX ADMIN — Medication 3 MILLIGRAM(S): at 22:53

## 2024-11-06 RX ADMIN — IPRATROPIUM BROMIDE AND ALBUTEROL SULFATE 3 MILLILITER(S): .5; 2.5 SOLUTION RESPIRATORY (INHALATION) at 08:37

## 2024-11-06 NOTE — PROGRESS NOTE ADULT - ASSESSMENT
88 yo female with COPD/asthma, dementia who was admitted with mental status changes and found to have Covid.  She was admitted 11/1, was febrile to 100.8 and found to have hypoxia- required supplemental O2  CTA and chest imaging without infiltrates, now known to have B/L subsegmental PE  She completed 5 days of RDV on 11/5. . Mixed coag negative staph in blood = contaminants  and GBS in urine likely colonizer.  She is now with acceptable O2 sats on RA.  Suggest:  1 Observe off antivirals  2 Anticoagulation as per primary service  3 With PE potentially contributing to hypoxia I would consider stopping decadron( No evidence of Covid pneumonia0  4 Supportive care. With no additional ID w/u planned we will stop actively following. Please call if ID issues arise

## 2024-11-06 NOTE — PROGRESS NOTE ADULT - SUBJECTIVE AND OBJECTIVE BOX
CC: f/u for Covid and hypoxia    Patient reports: she is sleepy, offers no specific complaints    REVIEW OF SYSTEMS:  All other review of systems negative (Comprehensive ROS): O2 sats 94% on RA    Antimicrobials Day #  :off    Other Medications Reviewed  MEDICATIONS  (STANDING):  albuterol/ipratropium for Nebulization 3 milliLiter(s) Nebulizer every 6 hours  aspirin  chewable 81 milliGRAM(s) Oral daily  cholecalciferol 2000 Unit(s) Oral daily  dexAMETHasone     Tablet 6 milliGRAM(s) Oral daily  enoxaparin Injectable 60 milliGRAM(s) SubCutaneous every 12 hours  fluticasone propionate/ salmeterol 100-50 MICROgram(s) Diskus 1 Dose(s) Inhalation two times a day  folic acid 1 milliGRAM(s) Oral daily  melatonin 3 milliGRAM(s) Oral at bedtime  petrolatum white Ointment 1 Application(s) Topical two times a day  QUEtiapine 12.5 milliGRAM(s) Oral daily  thiamine 100 milliGRAM(s) Oral daily    T(F): 97.9 (11-06-24 @ 13:59), Max: 97.9 (11-06-24 @ 13:59)  HR: 77 (11-06-24 @ 13:59)  BP: 113/68 (11-06-24 @ 13:59)  RR: 16 (11-06-24 @ 13:59)  SpO2: 94% (11-06-24 @ 13:59)  Wt(kg): --    PHYSICAL EXAM:  General: alert, no acute distress, obese  Eyes:  anicteric, no conjunctival injection, no discharge  Oropharynx: no lesions or injection 	  Neck: supple, without adenopathy  Lungs: clear to auscultation  Heart: regular rate and rhythm; no murmur, rubs or gallops  Abdomen: soft, nondistended, nontender, without mass or organomegaly  Skin: no lesions  Extremities: no clubbing, cyanosis, or edema  Neurologic: alert, oriented, moves all extremities    LAB RESULTS:    11-06    x   |  x   |  x   ----------------------------<  x   x    |  x   |  0.78      TPro  6.3  /  Alb  2.8[L]  /  TBili  0.3  /  DBili  0.0  /  AST  33  /  ALT  28  /  AlkPhos  88  11-06    LIVER FUNCTIONS - ( 06 Nov 2024 08:02 )  Alb: 2.8 g/dL / Pro: 6.3 g/dL / ALK PHOS: 88 U/L / ALT: 28 U/L / AST: 33 U/L / GGT: x             MICROBIOLOGY:  RECENT CULTURES:  11-04 @ 10:11 .Blood BLOOD     No growth at 48 Hours      11-04 @ 10:04 .Blood BLOOD     No growth at 48 Hours      11-01 @ 22:40 Clean Catch Clean Catch (Midstream)     >100,000 CFU/ml Streptococcus agalactiae (Group B)  Group B streptococci are susceptible to ampicillin,  penicillin and cefazolin, but may be resistant to  erythromycin and clindamycin.      11-01 @ 18:00 .Blood BLOOD Staphylococcus capitis    Growth in anaerobic bottle: Staphylococcus epidermidis "Susceptibilities  not performed"  Growth in aerobic bottle: Staphylococcus capitis    Growth in aerobic and anaerobic bottles: Gram Positive Cocci in Clusters        RADIOLOGY REVIEWED:

## 2024-11-06 NOTE — PROGRESS NOTE ADULT - ASSESSMENT
89F from NH with HO COPD, Former Smoker, Dementiawith history of COPD, former smoker and dementia  Admit Nov2 for worsening confusion/AMS, Hypoxemia, COVID 19 PNA s    COVID19   no pneumonia on CT w/o IC  Remdesivier 5/5 and dexamethasone 6/10.  ID noted      Acute PE            - CTA shows b/l segmental PE, no RV strain            - BNP, Trop normal;            - TTE with normal  EF. no RV strain            - on lovenox FD. will transition to Eliquis on DC            - pulm cx noted      Mild hypoxemia:               Improving              Likely due to PE              O2 sat 88% on Ra. 94% with 2L. O2 Nasal canula, wean of as able              ddimer 2900. CTA PE protocol               Duplex done on admission was neg for DVT    SIRS  Zosyn and vanco started empirically in ED. currently off vanco and zosyn  BC 1/2: coag neg staph. 2/2: GPC in clusters  Repeat BC pending. however per ID, can be dced home pending repeat BC as BC looks contamination  ID consult noted and appreciated    Metabolic encephalopathy 2/2 covid,hypoxia, PE: improved. now at baseline  baseline verbal with confusion per Son  CT head with no acute intracranial pathology; multi-level degenerative  changes in c-spine  CT abdomen pelvis: diverticulosis gallstones, renal cyst, but no acute pathology   ABG: mild hypercapnia    COPD  not in acute exacerbation   albuterol prn and advair    lower leg pain   Bilateral dopplers unremarkable  fall precautions   PT consulted--> JASON    Xerosis  Skin precautions. Gentle skin care with mild soaps  encourage po intake.    DVT ppx  lovenox    GOC: DNR/DNI    Dispo: DC pending auth. medically cleared.      11/5: updated son Estevan.   11/6: will update         89F from NH with HO COPD, Former Smoker, Dementiawith history of COPD, former smoker and dementia  Admit Nov2 for worsening confusion/AMS, Hypoxemia, COVID 19 PNA s    COVID19   no pneumonia on CT w/o IC  Remdesivier 5/5 completed 11/5  on dexamethasone 6/10. pulm wants to continue for total of 10 days  ID noted      Acute PE            - CTA shows b/l segmental PE, no RV strain            - BNP, Trop normal;            - TTE with normal  EF. no RV strain            - on lovenox FD. will transition to Eliquis on DC            - pulm cx noted. pulm f/u OP after DC      Mild hypoxia               Improved. now on RA              Likely due to PE              O2 sat 88% on Ra. 94% with 2L. O2 Nasal canula,               ddimer 2900.               CTA PE protocol : + PE              Duplex done on admission was neg for DVT    SIRS  Zosyn and vanco started empirically in ED. currently off vanco and zosyn  BC 1/2: coag neg staph. 2/2: GPC in clusters  Repeat BC pending. however per ID, can be dced home pending repeat BC as BC looks contamination  ID consult noted and appreciated    Metabolic encephalopathy 2/2 covid,hypoxia, PE: improved. now at baseline  baseline verbal with confusion per Son  CT head with no acute intracranial pathology; multi-level degenerative  changes in c-spine  CT abdomen pelvis: diverticulosis gallstones, renal cyst, but no acute pathology   ABG: mild hypercapnia    COPD  not in acute exacerbation   albuterol prn and advair    lower leg pain   Bilateral dopplers unremarkable  fall precautions   PT consulted--> JASON    Xerosis  Skin precautions. Gentle skin care with mild soaps  encourage po intake.    DVT ppx  lovenox    GOC: DNR/DNI    Dispo: DC pending auth. medically cleared.  possible 48 - 72 hrs per CM    11/5: updated son Estevan.   11/6: will update

## 2024-11-06 NOTE — PROGRESS NOTE ADULT - SUBJECTIVE AND OBJECTIVE BOX
Follow-up Pulmonary Progress Note  Chief Complaint : Sepsis    patient seen and examined  comfortable  no cp, sob, palp, n/v, cough, secretions.       Allergies :No Known Allergies      PAST MEDICAL & SURGICAL HISTORY:  Asthma with COPD    Mild cognitive impairment    No significant past surgical history        Medications:  MEDICATIONS  (STANDING):  albuterol/ipratropium for Nebulization 3 milliLiter(s) Nebulizer every 6 hours  aspirin  chewable 81 milliGRAM(s) Oral daily  cholecalciferol 2000 Unit(s) Oral daily  dexAMETHasone     Tablet 6 milliGRAM(s) Oral daily  enoxaparin Injectable 60 milliGRAM(s) SubCutaneous every 12 hours  fluticasone propionate/ salmeterol 100-50 MICROgram(s) Diskus 1 Dose(s) Inhalation two times a day  folic acid 1 milliGRAM(s) Oral daily  melatonin 3 milliGRAM(s) Oral at bedtime  petrolatum white Ointment 1 Application(s) Topical two times a day  QUEtiapine 12.5 milliGRAM(s) Oral daily  thiamine 100 milliGRAM(s) Oral daily    MEDICATIONS  (PRN):  acetaminophen     Tablet .. 650 milliGRAM(s) Oral every 6 hours PRN Temp greater or equal to 38C (100.4F), Mild Pain (1 - 3)  aluminum hydroxide/magnesium hydroxide/simethicone Suspension 30 milliLiter(s) Oral every 4 hours PRN Dyspepsia  ondansetron Injectable 4 milliGRAM(s) IV Push every 8 hours PRN Nausea and/or Vomiting      Antibiotics History  piperacillin/tazobactam IVPB.. 3.375 Gram(s) IV Intermittent every 8 hours, 11-01-24 @ 23:31, Stop order after: 7 Days  piperacillin/tazobactam IVPB... 3.375 Gram(s) IV Intermittent once, 11-01-24 @ 17:50, Stop order after: 1 Doses  remdesivir  IVPB   IV Intermittent , 11-01-24 @ 23:32  remdesivir  IVPB 100 milliGRAM(s) IV Intermittent every 24 hours, 11-03-24 @ 11:47, Stop order after: 4 Doses  remdesivir  IVPB 200 milliGRAM(s) IV Intermittent every 24 hours, 11-01-24 @ 23:41, Stop order after: 1 Doses  vancomycin  IVPB 1000 milliGRAM(s) IV Intermittent once, 11-02-24 @ 17:49, Stop order after: 1 Doses  vancomycin  IVPB. 1000 milliGRAM(s) IV Intermittent once, 11-01-24 @ 17:50, Stop order after: 1 Doses      Heme Medications   aspirin  chewable 81 milliGRAM(s) Oral daily, 11-02-24 @ 04:50  enoxaparin Injectable 60 milliGRAM(s) SubCutaneous every 12 hours, 11-04-24 @ 16:34      GI Medications  aluminum hydroxide/magnesium hydroxide/simethicone Suspension 30 milliLiter(s) Oral every 4 hours, 11-01-24 @ 23:27, Routine PRN        LABS:    11-06    x   |  x   |  x   ----------------------------<  x   x    |  x   |  0.78      TPro  6.3  /  Alb  2.8[L]  /  TBili  0.3  /  DBili  0.0  /  AST  33  /  ALT  28  /  AlkPhos  88  11-06    HIT ab -- 11-04 @ 10:04  HIT ab EIA --  D Dimer -[id number]         CULTURES: (if applicable)    Culture - Blood (collected 11-04-24 @ 10:11)  Source: .Blood BLOOD  Preliminary Report (11-06-24 @ 14:01):    No growth at 48 Hours    Culture - Blood (collected 11-04-24 @ 10:04)  Source: .Blood BLOOD  Preliminary Report (11-06-24 @ 14:01):    No growth at 48 Hours    Culture - Urine (collected 11-01-24 @ 22:40)  Source: Clean Catch Clean Catch (Midstream)  Final Report (11-04-24 @ 00:57):    >100,000 CFU/ml Streptococcus agalactiae (Group B)    Group B streptococci are susceptible to ampicillin,    penicillin and cefazolin, but may be resistant to    erythromycin and clindamycin.    Culture - Blood (collected 11-01-24 @ 18:00)  Source: .Blood BLOOD  Gram Stain (11-02-24 @ 18:01):    Growth in aerobic and anaerobic bottles: Gram Positive Cocci in Clusters  Final Report (11-04-24 @ 09:52):    Growth in anaerobic bottle: Staphylococcus epidermidis "Susceptibilities    not performed"    Growth in aerobic bottle: Staphylococcus capitis  Organism: Staphylococcus capitis (11-04-24 @ 09:52)  Organism: Staphylococcus capitis (11-04-24 @ 09:52)      -  Clindamycin: S <=0.25      -  Oxacillin: R >2      -  Gentamicin: S <=1 Should not be used as monotherapy      -  Vancomycin: S 1      -  Tetracycline: S <=1      Method Type: BEBETO      -  Penicillin: R 2      -  Rifampin: S <=1 Should not be used as monotherapy      -  Erythromycin: S <=0.25      -  Trimethoprim/Sulfamethoxazole: S <=0.5/9.5    Culture - Blood (collected 11-01-24 @ 18:00)  Source: .Blood BLOOD  Gram Stain (11-03-24 @ 14:20):    Growth in aerobic bottle: Gram positive cocci in pairs    Growth in anaerobic bottle: Gram positive cocci in pairs  Final Report (11-04-24 @ 13:53):    Growth in aerobic and anaerobic bottles: Staphylococcus capitis    "Susceptibilities not performed"    Direct identification is available within approximately 3-5    hours either by Blood Panel Multiplexed PCR or Direct    MALDI-TOF. Details: [email or web address]  Organism: Blood Culture PCR (11-03-24 @ 14:20)  Organism: Blood Culture PCR (11-03-24 @ 14:20)      -  Coagulase negative Staphylococcus: Detec      Method Type: PCR            CAPILLARY BLOOD GLUCOSE          RADIOLOGY  CXR:      CT:    ECHO:      VITALS:  T(C): 36.6 (11-06-24 @ 13:59), Max: 36.6 (11-06-24 @ 05:00)  T(F): 97.9 (11-06-24 @ 13:59), Max: 97.9 (11-06-24 @ 13:59)  HR: 77 (11-06-24 @ 13:59) (65 - 86)  BP: 113/68 (11-06-24 @ 13:59) (113/68 - 124/70)  BP(mean): --  ABP: --  ABP(mean): --  RR: 16 (11-06-24 @ 13:59) (16 - 18)  SpO2: 94% (11-06-24 @ 13:59) (94% - 98%)  CVP(mm Hg): --  CVP(cm H2O): --    Ins and Outs     11-06-24 @ 07:01  -  11-06-24 @ 20:08  --------------------------------------------------------  IN: 0 mL / OUT: 300 mL / NET: -300 mL                I&O's Detail    06 Nov 2024 07:01  -  06 Nov 2024 20:08  --------------------------------------------------------  IN:  Total IN: 0 mL    OUT:    Voided (mL): 300 mL  Total OUT: 300 mL    Total NET: -300 mL

## 2024-11-06 NOTE — PROGRESS NOTE ADULT - SUBJECTIVE AND OBJECTIVE BOX
Medicine Progress Note    Patient is a 89y old  Female who presents with a chief complaint of COVID PNA (05 Nov 2024 11:44)      SUBJECTIVE / OVERNIGHT EVENTS:  seen and examined  Chart reviewed  no overnight events  MEDICATIONS  (STANDING):  albuterol/ipratropium for Nebulization 3 milliLiter(s) Nebulizer every 6 hours  aspirin  chewable 81 milliGRAM(s) Oral daily  cholecalciferol 2000 Unit(s) Oral daily  dexAMETHasone     Tablet 6 milliGRAM(s) Oral daily  enoxaparin Injectable 60 milliGRAM(s) SubCutaneous every 12 hours  fluticasone propionate/ salmeterol 100-50 MICROgram(s) Diskus 1 Dose(s) Inhalation two times a day  folic acid 1 milliGRAM(s) Oral daily  melatonin 3 milliGRAM(s) Oral at bedtime  petrolatum white Ointment 1 Application(s) Topical two times a day  QUEtiapine 12.5 milliGRAM(s) Oral daily  thiamine 100 milliGRAM(s) Oral daily    MEDICATIONS  (PRN):  acetaminophen     Tablet .. 650 milliGRAM(s) Oral every 6 hours PRN Temp greater or equal to 38C (100.4F), Mild Pain (1 - 3)  aluminum hydroxide/magnesium hydroxide/simethicone Suspension 30 milliLiter(s) Oral every 4 hours PRN Dyspepsia  ondansetron Injectable 4 milliGRAM(s) IV Push every 8 hours PRN Nausea and/or Vomiting    no complaints  feeling better.  SOB, Cough improved  on RA  smiling, joking. pleasant today  DC pending auth    ADDITIONAL REVIEW OF SYSTEMS:  denied CP/SOB/palpitation/dixiness/abd pain/headaches. did not answer further                Vital Signs Last 24 Hrs  T(C): 36.6 (11-06-24 @ 13:59), Max: 36.6 (11-06-24 @ 05:00)  T(F): 97.9 (11-06-24 @ 13:59), Max: 97.9 (11-06-24 @ 13:59)  HR: 77 (11-06-24 @ 13:59) (65 - 86)  BP: 113/68 (11-06-24 @ 13:59) (113/68 - 124/70)  BP(mean): --  RR: 16 (11-06-24 @ 13:59) (16 - 18)  SpO2: 94% (11-06-24 @ 13:59) (94% - 98%)              GENERAL: Not in distress. Alert    HEENT: AT/NC. clear conjuctiva, MMM.   no pallor or icterus  CARDIOVASCULAR: RRR S1, S2. soft SM. no rubs/gallop  LUNGS: BLAE+, no rales, no wheezing, no rhonchi.    ABDOMEN: ND. Soft,  NT, no guarding / rebound / rigidity. BS normoactive. No CVA tenderness.    BACK: No spine tenderness.  EXTREMITIES: no edema. no leg or calf TP.  SKIN: no rash.  NEUROLOGIC: AAO*3.strength is symmetric, sensation intact, speech fluent.    PSYCHIATRIC: Calm.  No agitation.    LABS:                        12.4   7.37  )-----------( 271      ( 04 Nov 2024 08:47 )             38.8     11-04    137  |  103  |  26[H]  ----------------------------<  141[H]  3.9   |  27  |  0.93    Ca    8.7      04 Nov 2024 10:04  Mg     2.0     11-04    TPro  6.5  /  Alb  2.8[L]  /  TBili  0.3  /  DBili  0.1  /  AST  22  /  ALT  11  /  AlkPhos  91  11-04    PT/INR - ( 04 Nov 2024 08:47 )   PT: 12.6 sec;   INR: 1.07 ratio               Urinalysis Basic - ( 04 Nov 2024 10:04 )    Color: x / Appearance: x / SG: x / pH: x  Gluc: 141 mg/dL / Ketone: x  / Bili: x / Urobili: x   Blood: x / Protein: x / Nitrite: x   Leuk Esterase: x / RBC: x / WBC x   Sq Epi: x / Non Sq Epi: x / Bacteria: x            RADIOLOGY & ADDITIONAL TESTS:  Imaging from Last 24 Hours:    Electrocardiogram/QTc Interval:    COORDINATION OF CARE:  Care Discussed with Consultants/Other Providers:

## 2024-11-06 NOTE — PROGRESS NOTE ADULT - ASSESSMENT
Physical Examination:  GENERAL:               Alert,  No acute distress.    HEENT:                  No JVD, Moist MM  PULM:                     Bilateral air entry, Clear to auscultation bilaterally diminished , no significant sputum production, No Rales, No Rhonchi, No Wheezing  CVS:                         S1, S2,  +Murmur  ABD:                        Soft, nondistended, nontender, normoactive bowel sounds,   EXT:                         No edema, nontender, No Cyanosis or Clubbing    NEURO:                  Alert, interactive, nonfocal, follows commands  PSYC:                      Calm, limited Insight.     Plan  1. Covid pneumonia  2. PE Provoked due to COVID  3. Chronic COPD  4. Dementia     Plan  Remdesivir  x 3-5 days   decadron  x 10 days   currently on Lovenox, can transition to NOAC 3-6 months  respiratory status appears stable today  off n/c o2 at this time  out patient pulm f/u when medically ready.

## 2024-11-07 ENCOUNTER — TRANSCRIPTION ENCOUNTER (OUTPATIENT)
Age: 89
End: 2024-11-07

## 2024-11-07 LAB
ALBUMIN SERPL ELPH-MCNC: 2.5 G/DL — LOW (ref 3.3–5)
ALP SERPL-CCNC: 84 U/L — SIGNIFICANT CHANGE UP (ref 40–120)
ALT FLD-CCNC: 23 U/L — SIGNIFICANT CHANGE UP (ref 10–45)
AST SERPL-CCNC: 23 U/L — SIGNIFICANT CHANGE UP (ref 10–40)
BILIRUB DIRECT SERPL-MCNC: 0 MG/DL — SIGNIFICANT CHANGE UP (ref 0–0.3)
BILIRUB INDIRECT FLD-MCNC: 0.3 MG/DL — SIGNIFICANT CHANGE UP (ref 0.2–1)
BILIRUB SERPL-MCNC: 0.3 MG/DL — SIGNIFICANT CHANGE UP (ref 0.2–1.2)
CREAT SERPL-MCNC: 0.46 MG/DL — LOW (ref 0.5–1.3)
EGFR: 91 ML/MIN/1.73M2 — SIGNIFICANT CHANGE UP
PROT SERPL-MCNC: 6 G/DL — SIGNIFICANT CHANGE UP (ref 6–8.3)

## 2024-11-07 PROCEDURE — 99232 SBSQ HOSP IP/OBS MODERATE 35: CPT

## 2024-11-07 RX ORDER — APIXABAN 5 MG/1
10 TABLET, FILM COATED ORAL EVERY 12 HOURS
Refills: 0 | Status: DISCONTINUED | OUTPATIENT
Start: 2024-11-07 | End: 2024-11-11

## 2024-11-07 RX ADMIN — PETROLATUM 1 APPLICATION(S): 987.89 OINTMENT TOPICAL at 07:08

## 2024-11-07 RX ADMIN — APIXABAN 10 MILLIGRAM(S): 5 TABLET, FILM COATED ORAL at 18:20

## 2024-11-07 RX ADMIN — IPRATROPIUM BROMIDE AND ALBUTEROL SULFATE 3 MILLILITER(S): .5; 2.5 SOLUTION RESPIRATORY (INHALATION) at 19:53

## 2024-11-07 RX ADMIN — Medication 3 MILLIGRAM(S): at 22:39

## 2024-11-07 RX ADMIN — DEXAMETHASONE 1.5 MG 6 MILLIGRAM(S): 1.5 TABLET ORAL at 06:31

## 2024-11-07 RX ADMIN — FLUTICASONE PROPIONATE AND SALMETEROL XINAFOATE 1 DOSE(S): 230; 21 AEROSOL, METERED RESPIRATORY (INHALATION) at 19:52

## 2024-11-07 RX ADMIN — PETROLATUM 1 APPLICATION(S): 987.89 OINTMENT TOPICAL at 18:06

## 2024-11-07 RX ADMIN — IPRATROPIUM BROMIDE AND ALBUTEROL SULFATE 3 MILLILITER(S): .5; 2.5 SOLUTION RESPIRATORY (INHALATION) at 15:37

## 2024-11-07 RX ADMIN — Medication 60 MILLIGRAM(S): at 06:31

## 2024-11-07 NOTE — DISCHARGE NOTE PROVIDER - HOSPITAL COURSE
Hospital Course  HPI:  89 year-old-female with history of COPD, former smoker,  and dementia sent in from [facility] presenting with altered mental status and listlessness today.  Was found febrile at triage. EMS reported prior fall unclear etiology and nursing home reported x-rays were performed but no results were found with the patient. Patient is minimally verbal, ros unable to be obtained.   Upon arrival vitals 127/74, HR 98, RR 18, Temp 100.8, SpO2 97% on 3L NC  EKG reviewed, sinus tachycardia, no ST elevations or t-wave inversions   Chest xray wet read with mild bilateral infiltrates  (01 Nov 2024 22:49)    Upon admission, patient positive for COVID. Pulmonology consulted. Patient treated with 5 day course of Remdesivir, as well as decadron. Blood cultures were drawn, BC grew 3/4 staph capitus and 1/4 staph epi, likely contaminants. ID consulted, no antibiotics recommended. CTA performed as patient hypoxic, found to have PE. Patient started on therapeutic lovenox, transitioned to eliquis. PT evaluated patient and recommended JASON.       You were admitted for altered mental status  You were diagnosed with covid 19 as well as pulmonary embolism  You were treated with blood thinners, continue to take Eliquis 10mg twice a day for total of 7 days through 11/14. Then take 5mg twice a day, likely will need from 3-6 months. Follow up with primary care provider.      You will need to follow up with your primary care physician.      Palliative Care / Advanced Care Planning  Code Status:  Patient/Family agreeable to Hospice/Palliative (Y/N)?  Summary of Goals of Care Conversation:    Discharging Provider:  RAJINDER Santana  Contact Info: [phone] - Please call with any questions or concerns.    Outpatient Provider:     SNF Provider: Hospital Course  HPI:  89 year-old-female with history of COPD, former smoker,  and dementia sent in from [facility] presenting with altered mental status and listlessness today.  Was found febrile at triage. EMS reported prior fall unclear etiology and nursing home reported x-rays were performed but no results were found with the patient. Patient is minimally verbal, ros unable to be obtained.   Upon arrival vitals 127/74, HR 98, RR 18, Temp 100.8, SpO2 97% on 3L NC  EKG reviewed, sinus tachycardia, no ST elevations or t-wave inversions   Chest xray wet read with mild bilateral infiltrates  (01 Nov 2024 22:49)    Upon admission, patient positive for COVID. Pulmonology consulted. Patient treated with 5 day course of Remdesivir, as well as decadron. Blood cultures were drawn, BC grew 3/4 staph capitus and 1/4 staph epi, likely contaminants. ID consulted, no antibiotics recommended. CTA performed as patient hypoxic, found to have PE. Patient started on therapeutic lovenox, transitioned to eliquis 11/7 PM. PT evaluated patient and recommended JASON. Pt remained hemodynamically stable for d/c.      You were admitted for altered mental status  You were diagnosed with covid 19 as well as pulmonary embolism  You were treated with blood thinners, continue to take Eliquis 10mg twice a day for total of 7 days through 11/14. Then take 5mg twice a day, likely will need from 3-6 months. Follow up with primary care provider.      You will need to follow up with your primary care physician.      Palliative Care / Advanced Care Planning  Code Status:  Patient/Family agreeable to Hospice/Palliative (Y/N)?  Summary of Goals of Care Conversation:    Discharging Provider:  RAJINDER Santana  Contact Info: [phone] - Please call with any questions or concerns.    Outpatient Provider:     SNF Provider: Hospital Course  HPI:  89 year-old-female with history of COPD, former smoker,  and dementia sent in from [facility] presenting with altered mental status and listlessness today.  Was found febrile at triage. EMS reported prior fall unclear etiology and nursing home reported x-rays were performed but no results were found with the patient. Patient is minimally verbal, ros unable to be obtained.   Upon arrival vitals 127/74, HR 98, RR 18, Temp 100.8, SpO2 97% on 3L NC  EKG reviewed, sinus tachycardia, no ST elevations or t-wave inversions   Chest xray wet read with mild bilateral infiltrates  (01 Nov 2024 22:49)    Upon admission, patient positive for COVID. Pulmonology consulted. Patient treated with 5 day course of Remdesivir, as well as decadron. Blood cultures were drawn, BC grew 3/4 staph capitus and 1/4 staph epi, likely contaminants. ID consulted, no antibiotics recommended. CTA performed as patient hypoxic, found to have PE. Patient started on therapeutic lovenox, transitioned to eliquis 11/7 PM. Supplemental O2 weaned off. PT evaluated patient and recommended JASNO. Pt remained hemodynamically stable for d/c.      You were admitted for altered mental status  You were diagnosed with covid 19 as well as pulmonary embolism  You were treated with blood thinners, continue to take Eliquis 10mg twice a day for total of 7 days through 11/14. Then take 5mg twice a day, likely will need from 3-6 months. Follow up with primary care provider.      You will need to follow up with your primary care physician.      Palliative Care / Advanced Care Planning  Code Status: DNR/DNI    Discharging Provider:  RAJINDER Santana  Contact Info: [phone] - Please call with any questions or concerns.    Outpatient Provider: Mignon - notified  SNF Provider: Mignon - notified

## 2024-11-07 NOTE — DISCHARGE NOTE PROVIDER - NSDCCPCAREPLAN_GEN_ALL_CORE_FT
PRINCIPAL DISCHARGE DIAGNOSIS  Diagnosis: Pulmonary embolism  Assessment and Plan of Treatment: You were found to have blood clot in the lungs due to COVID+. You were treated with blood thinners; continue to take Eliquis 10mg twice a day for total of 7 days through 11/14. Then take 5mg twice a day, likely will need from 3-6 months. Follow up with primary care provider.      SECONDARY DISCHARGE DIAGNOSES  Diagnosis: COVID-19  Assessment and Plan of Treatment: You were found to have COVID+ and seen by the pulmonologist. You are now off oxygen and may discontinue contact/airborne precautions.    Diagnosis: Metabolic encephalopathy  Assessment and Plan of Treatment: You came in with altered mental status due to COVID that improved with supplemental O2 and supportive care.

## 2024-11-07 NOTE — DISCHARGE NOTE PROVIDER - CARE PROVIDER_API CALL
Rafael Crow.  [facility]  [address] [place] [address]  Phone: [phone]  Fax: [phone]  Follow Up Time:

## 2024-11-07 NOTE — DISCHARGE NOTE PROVIDER - DETAILS OF MALNUTRITION DIAGNOSIS/DIAGNOSES
This patient has been assessed with a concern for Malnutrition and was treated during this hospitalization for the following Nutrition diagnosis/diagnoses:     -  11/03/2024: Severe protein-calorie malnutrition

## 2024-11-07 NOTE — PROGRESS NOTE ADULT - SUBJECTIVE AND OBJECTIVE BOX
Patient is a 89y old  Female who presents with a chief complaint of COVID PNA (06 Nov 2024 20:08)      Patient seen and examined at bedside. No overnight events reported. Patient states she is feeling better. Denies chest pain, sob, nausea, vomiting.     ALLERGIES:  No Known Allergies    MEDICATIONS  (STANDING):  albuterol/ipratropium for Nebulization 3 milliLiter(s) Nebulizer every 6 hours  aspirin  chewable 81 milliGRAM(s) Oral daily  cholecalciferol 2000 Unit(s) Oral daily  dexAMETHasone     Tablet 6 milliGRAM(s) Oral daily  enoxaparin Injectable 60 milliGRAM(s) SubCutaneous every 12 hours  fluticasone propionate/ salmeterol 100-50 MICROgram(s) Diskus 1 Dose(s) Inhalation two times a day  folic acid 1 milliGRAM(s) Oral daily  melatonin 3 milliGRAM(s) Oral at bedtime  petrolatum white Ointment 1 Application(s) Topical two times a day  QUEtiapine 12.5 milliGRAM(s) Oral daily  thiamine 100 milliGRAM(s) Oral daily    MEDICATIONS  (PRN):  acetaminophen     Tablet .. 650 milliGRAM(s) Oral every 6 hours PRN Temp greater or equal to 38C (100.4F), Mild Pain (1 - 3)  aluminum hydroxide/magnesium hydroxide/simethicone Suspension 30 milliLiter(s) Oral every 4 hours PRN Dyspepsia  ondansetron Injectable 4 milliGRAM(s) IV Push every 8 hours PRN Nausea and/or Vomiting    Vital Signs Last 24 Hrs  T(F): 97.4 (07 Nov 2024 05:25), Max: 97.9 (06 Nov 2024 13:59)  HR: 66 (07 Nov 2024 09:21) (66 - 77)  BP: 129/79 (07 Nov 2024 05:25) (113/68 - 129/79)  RR: 16 (07 Nov 2024 05:25) (16 - 16)  SpO2: 91% (07 Nov 2024 09:21) (91% - 94%)  I&O's Summary    06 Nov 2024 07:01  -  07 Nov 2024 07:00  --------------------------------------------------------  IN: 0 mL / OUT: 300 mL / NET: -300 mL      PHYSICAL EXAM:  General: NAD, A/O x 3  ENT: No gross hearing impairment, Moist mucous membranes, no thrush  Neck: Supple, No JVD  Lungs: Coarse BS to auscultation bilaterally, good air entry, non-labored breathing  Cardio: RRR, S1/S2, + murmur  Abdomen: Soft, Nontender, Nondistended; Bowel sounds present  Extremities: No calf tenderness, No cyanosis, No pitting edema  Psych: Appropriate mood and affect    LABS:    11-07    x   |  x   |  x   ----------------------------<  x   x    |  x   |  0.46      TPro  6.0  /  Alb  2.5  /  TBili  0.3  /  DBili  0.0  /  AST  23  /  ALT  23  /  AlkPhos  84  11-07          PT/INR - ( 06 Nov 2024 08:02 )   PT: 13.4 sec;   INR: 1.14 ratio               CARDIAC MARKERS ( 04 Nov 2024 16:30 )  x     / 24.6 ng/L / x     / x     / x                                Culture - Blood (collected 04 Nov 2024 10:11)  Source: .Blood BLOOD  Preliminary Report (06 Nov 2024 14:01):    No growth at 48 Hours    Culture - Blood (collected 04 Nov 2024 10:04)  Source: .Blood BLOOD  Preliminary Report (06 Nov 2024 14:01):    No growth at 48 Hours    Culture - Urine (collected 01 Nov 2024 22:40)  Source: Clean Catch Clean Catch (Midstream)  Final Report (04 Nov 2024 00:57):    >100,000 CFU/ml Streptococcus agalactiae (Group B)    Group B streptococci are susceptible to ampicillin,    penicillin and cefazolin, but may be resistant to    erythromycin and clindamycin.    Culture - Blood (collected 01 Nov 2024 18:00)  Source: .Blood BLOOD  Gram Stain (02 Nov 2024 18:01):    Growth in aerobic and anaerobic bottles: Gram Positive Cocci in Clusters  Final Report (04 Nov 2024 09:52):    Growth in anaerobic bottle: Staphylococcus epidermidis "Susceptibilities    not performed"    Growth in aerobic bottle: Staphylococcus capitis  Organism: Staphylococcus capitis (04 Nov 2024 09:52)  Organism: Staphylococcus capitis (04 Nov 2024 09:52)      Method Type: BEBETO      -  Clindamycin: S <=0.25      -  Erythromycin: S <=0.25      -  Gentamicin: S <=1 Should not be used as monotherapy      -  Oxacillin: R >2      -  Penicillin: R 2      -  Rifampin: S <=1 Should not be used as monotherapy      -  Tetracycline: S <=1      -  Trimethoprim/Sulfamethoxazole: S <=0.5/9.5      -  Vancomycin: S 1    Culture - Blood (collected 01 Nov 2024 18:00)  Source: .Blood BLOOD  Gram Stain (03 Nov 2024 14:20):    Growth in aerobic bottle: Gram positive cocci in pairs    Growth in anaerobic bottle: Gram positive cocci in pairs  Final Report (04 Nov 2024 13:53):    Growth in aerobic and anaerobic bottles: Staphylococcus capitis    "Susceptibilities not performed"    Direct identification is available within approximately 3-5    hours either by Blood Panel Multiplexed PCR or Direct    MALDI-TOF. Details: [email or web address]  Organism: Blood Culture PCR (03 Nov 2024 14:20)  Organism: Blood Culture PCR (03 Nov 2024 14:20)      Method Type: PCR      -  Coagulase negative Staphylococcus: Detec        RADIOLOGY & ADDITIONAL TESTS:    Care Discussed with Consultants/Other Providers:    Patient is a 89y old  Female who presents with a chief complaint of COVID PNA (06 Nov 2024 20:08)      Patient seen and examined at bedside. No overnight events reported. Patient states she is feeling better. Denies chest pain, sob, nausea, vomiting.     ALLERGIES:  No Known Allergies    MEDICATIONS  (STANDING):  albuterol/ipratropium for Nebulization 3 milliLiter(s) Nebulizer every 6 hours  aspirin  chewable 81 milliGRAM(s) Oral daily  cholecalciferol 2000 Unit(s) Oral daily  dexAMETHasone     Tablet 6 milliGRAM(s) Oral daily  enoxaparin Injectable 60 milliGRAM(s) SubCutaneous every 12 hours  fluticasone propionate/ salmeterol 100-50 MICROgram(s) Diskus 1 Dose(s) Inhalation two times a day  folic acid 1 milliGRAM(s) Oral daily  melatonin 3 milliGRAM(s) Oral at bedtime  petrolatum white Ointment 1 Application(s) Topical two times a day  QUEtiapine 12.5 milliGRAM(s) Oral daily  thiamine 100 milliGRAM(s) Oral daily    MEDICATIONS  (PRN):  acetaminophen     Tablet .. 650 milliGRAM(s) Oral every 6 hours PRN Temp greater or equal to 38C (100.4F), Mild Pain (1 - 3)  aluminum hydroxide/magnesium hydroxide/simethicone Suspension 30 milliLiter(s) Oral every 4 hours PRN Dyspepsia  ondansetron Injectable 4 milliGRAM(s) IV Push every 8 hours PRN Nausea and/or Vomiting    Vital Signs Last 24 Hrs  T(F): 97.4 (07 Nov 2024 05:25), Max: 97.9 (06 Nov 2024 13:59)  HR: 66 (07 Nov 2024 09:21) (66 - 77)  BP: 129/79 (07 Nov 2024 05:25) (113/68 - 129/79)  RR: 16 (07 Nov 2024 05:25) (16 - 16)  SpO2: 91% (07 Nov 2024 09:21) (91% - 94%)  I&O's Summary    06 Nov 2024 07:01  -  07 Nov 2024 07:00  --------------------------------------------------------  IN: 0 mL / OUT: 300 mL / NET: -300 mL      PHYSICAL EXAM:  General: NAD, A/O x 3  ENT: No gross hearing impairment, Moist mucous membranes, no thrush  Neck: Supple, No JVD  Lungs: Coarse BS to auscultation bilaterally, good air entry, non-labored breathing  Cardio: RRR, S1/S2, + murmur  Abdomen: Soft, Nontender, Nondistended; Bowel sounds present  Extremities: No calf tenderness, No cyanosis, No pitting edema  Psych: Appropriate mood and affect    LABS:    11-07    x   |  x   |  x   ----------------------------<  x   x    |  x   |  0.46      TPro  6.0  /  Alb  2.5  /  TBili  0.3  /  DBili  0.0  /  AST  23  /  ALT  23  /  AlkPhos  84  11-07          PT/INR - ( 06 Nov 2024 08:02 )   PT: 13.4 sec;   INR: 1.14 ratio               CARDIAC MARKERS ( 04 Nov 2024 16:30 )  x     / 24.6 ng/L / x     / x     / x                                Culture - Blood (collected 04 Nov 2024 10:11)  Source: .Blood BLOOD  Preliminary Report (06 Nov 2024 14:01):    No growth at 48 Hours    Culture - Blood (collected 04 Nov 2024 10:04)  Source: .Blood BLOOD  Preliminary Report (06 Nov 2024 14:01):    No growth at 48 Hours    Culture - Urine (collected 01 Nov 2024 22:40)  Source: Clean Catch Clean Catch (Midstream)  Final Report (04 Nov 2024 00:57):    >100,000 CFU/ml Streptococcus agalactiae (Group B)    Group B streptococci are susceptible to ampicillin,    penicillin and cefazolin, but may be resistant to    erythromycin and clindamycin.    Culture - Blood (collected 01 Nov 2024 18:00)  Source: .Blood BLOOD  Gram Stain (02 Nov 2024 18:01):    Growth in aerobic and anaerobic bottles: Gram Positive Cocci in Clusters  Final Report (04 Nov 2024 09:52):    Growth in anaerobic bottle: Staphylococcus epidermidis "Susceptibilities    not performed"    Growth in aerobic bottle: Staphylococcus capitis  Organism: Staphylococcus capitis (04 Nov 2024 09:52)  Organism: Staphylococcus capitis (04 Nov 2024 09:52)      Method Type: BEBETO      -  Clindamycin: S <=0.25      -  Erythromycin: S <=0.25      -  Gentamicin: S <=1 Should not be used as monotherapy      -  Oxacillin: R >2      -  Penicillin: R 2      -  Rifampin: S <=1 Should not be used as monotherapy      -  Tetracycline: S <=1      -  Trimethoprim/Sulfamethoxazole: S <=0.5/9.5      -  Vancomycin: S 1    Culture - Blood (collected 01 Nov 2024 18:00)  Source: .Blood BLOOD  Gram Stain (03 Nov 2024 14:20):    Growth in aerobic bottle: Gram positive cocci in pairs    Growth in anaerobic bottle: Gram positive cocci in pairs  Final Report (04 Nov 2024 13:53):    Growth in aerobic and anaerobic bottles: Staphylococcus capitis    "Susceptibilities not performed"    Direct identification is available within approximately 3-5    hours either by Blood Panel Multiplexed PCR or Direct    MALDI-TOF. Details: [email or web address]  Organism: Blood Culture PCR (03 Nov 2024 14:20)  Organism: Blood Culture PCR (03 Nov 2024 14:20)      Method Type: PCR      -  Coagulase negative Staphylococcus: Detec        RADIOLOGY & ADDITIONAL TESTS:    Care Discussed with Consultants/Other Providers:   yes with pulm

## 2024-11-07 NOTE — PROGRESS NOTE ADULT - ASSESSMENT
89F from NH with HO COPD, Former Smoker, Dementiawith history of COPD, former smoker and dementia  Admit Nov2 for worsening confusion/AMS, Hypoxemia, COVID 19 PNA s    COVID19   no pneumonia on CT w/o IC  Remdesivier 5/5 completed 11/5  on dexamethasone 7/10. pulm wants to continue for total of 10 days  ID noted      Acute PE            - CTA shows b/l segmental PE, no RV strain            - BNP, Trop normal;            - TTE with normal  EF. no RV strain            - transition to eliquis tonight             - pulm cx noted. pulm f/u OP after DC      Mild hypoxia               Improved. now on RA              Likely due to PE              O2 sat 91% on RA              ddimer 2900.               CTA PE protocol : + PE              Duplex done on admission was neg for DVT    SIRS  Zosyn and vanco started empirically in ED. currently off vanco and zosyn  BC 1/2: coag neg staph. 2/2: GPC in clusters  Repeat BC no growth at 48hrs. however per ID, can be dced home pending final repeat BC as BC looks contamination  ID consult noted and appreciated    Metabolic encephalopathy 2/2 covid, hypoxia, PE: improved. now at baseline  baseline verbal with confusion per Son  CT head with no acute intracranial pathology; multi-level degenerative  changes in c-spine  CT abdomen pelvis: diverticulosis gallstones, renal cyst, but no acute pathology   ABG: mild hypercapnia    COPD  not in acute exacerbation   albuterol prn and advair    lower leg pain   Bilateral dopplers unremarkable  fall precautions   PT consulted--> JASON    Xerosis  Skin precautions. Gentle skin care with mild soaps  encourage po intake.    DVT ppx  lovenox    GOC: DNR/DNI    Dispo: DC pending auth. medically cleared.  possible 48 - 72 hrs per CM             89F from NH with HO COPD, Former Smoker, Dementia with history of COPD, former smoker and dementia  Admit Nov2 for worsening confusion/AMS, Hypoxemia, COVID 19 PNA s    COVID19   no pneumonia on CT w/o IC  Remdesivir 5/5 completed 11/5  on dexamethasone 7/10. pulm wants to continue for total of 10 days  ID noted      Acute PE            - CTA shows b/l segmental PE, no RV strain            - BNP, Trop normal;            - TTE with normal  EF. no RV strain            - transition to eliquis tonight             - pulm cx noted. pulm f/u OP after DC      Mild hypoxia               Improved. now on RA              Likely due to PE              O2 sat 91% on RA              ddimer 2900.               CTA PE protocol : + PE              Duplex done on admission was neg for DVT    SIRS  Zosyn and vanco started empirically in ED. currently off vanco and zosyn  BC 1/2: coag neg staph. 2/2: GPC in clusters  Repeat BC no growth at 48hrs. however per ID, can be dced home pending final repeat BC as BC looks contamination  ID consult noted and appreciated    Metabolic encephalopathy 2/2 covid, hypoxia, PE: improved. now at baseline  baseline verbal with confusion per Son  CT head with no acute intracranial pathology; multi-level degenerative  changes in c-spine  CT abdomen pelvis: diverticulosis gallstones, renal cyst, but no acute pathology   ABG: mild hypercapnia    COPD  not in acute exacerbation   albuterol prn and advair    lower leg pain   Bilateral dopplers unremarkable  fall precautions   PT consulted--> JASON    Xerosis  Skin precautions. Gentle skin care with mild soaps  encourage po intake.    DVT ppx  lovenox    GOC: DNR/DNI    Dispo: DC pending auth. medically cleared.  possible 48 - 72 hrs per CM  Updated patient's son             89F from NH with HO COPD, Former Smoker, Dementia with history of COPD, former smoker and dementia  Admit Nov2 for worsening confusion/AMS, Hypoxemia, COVID 19 PNA    COVID19   no pneumonia on CT w/o IC  Remdesivir 5/5 completed 11/5  on dexamethasone 7/10. pulm wants to continue for total of 10 days  ID recs noted      Acute PE            - CTA shows b/l segmental PE, no RV strain            - BNP, Trop normal;            - TTE with normal  EF. no RV strain            - transition to eliquis tonight from Lovenox            - pulm cx noted. pulm f/u OP after DC      Mild hypoxia               Improved. now on RA              Likely due to PE              O2 sat 91% on RA              ddimer 2900.               CTA PE protocol : + PE              Duplex done on admission was neg for DVT    SIRS  Zosyn and vanco started empirically in ED. currently off vanco and zosyn  BC 1/2: coag neg staph. 2/2: GPC in clusters  Repeat BC no growth at 48hrs. however per ID, can be dced home pending final repeat BC as BC looks contamination  ID consult noted and appreciated    Metabolic encephalopathy 2/2 covid, hypoxia, PE: improved. now at baseline  baseline verbal with confusion per Son  CT head with no acute intracranial pathology; multi-level degenerative  changes in c-spine  CT abdomen pelvis: diverticulosis gallstones, renal cyst, but no acute pathology   ABG: mild hypercapnia    COPD  not in acute exacerbation   albuterol prn and advair    lower leg pain   Bilateral dopplers unremarkable  fall precautions   PT consulted--> JASON    Xerosis  Skin precautions. Gentle skin care with mild soaps  encourage po intake.    DVT ppx  lovenox    GOC: DNR/DNI    Dispo: DC pending auth. medically cleared.  possible 48 - 72 hrs per CM  Updated patient's son

## 2024-11-07 NOTE — DISCHARGE NOTE PROVIDER - NSDCMRMEDTOKEN_GEN_ALL_CORE_FT
aspirin 81 mg oral tablet, chewable: 1 tab(s) orally once a day  Breo Ellipta 100 mcg-25 mcg/inh inhalation powder: 1 puff(s) inhaled once a day  cholecalciferol oral tablet: 1 tab(s) orally once a day 2000 unit(s) orally once a day  folic acid 1 mg oral tablet: 1 tab(s) orally once a day  Seroquel 25 mg oral tablet: 0.5 tab(s) orally once a day  thiamine 100 mg oral tablet: 1 tab(s) orally once a day   acetaminophen 325 mg oral tablet: 2 tab(s) orally every 6 hours As needed Temp greater or equal to 38C (100.4F), Mild Pain (1 - 3)  apixaban 5 mg oral tablet: 2 tab(s) orally every 12 hours stop 10mg BID on 11/14 pm. Start 5mg BID on 11/15 AM  aspirin 81 mg oral tablet, chewable: 1 tab(s) orally once a day  Breo Ellipta 100 mcg-25 mcg/inh inhalation powder: 1 puff(s) inhaled once a day  cholecalciferol oral tablet: 1 tab(s) orally once a day 2000 unit(s) orally once a day  folic acid 1 mg oral tablet: 1 tab(s) orally once a day  Seroquel 25 mg oral tablet: 0.5 tab(s) orally once a day  thiamine 100 mg oral tablet: 1 tab(s) orally once a day

## 2024-11-07 NOTE — DISCHARGE NOTE PROVIDER - ATTENDING DISCHARGE PHYSICAL EXAMINATION:
GENERAL: pt laying in bed in NAD  HEENT: NC/AT, MMM  PULMONARY: nonlabored breathing, no resp distress  CARDIOVASCULAR:  RRR, no murmur  GASTROINTESTINAL: soft nontender    MUSCULOSKELETAL:  +weakness, no edema to BLE  NEUROLOGIC: +Cognitive impairment; pleasantly demented 61

## 2024-11-08 LAB
ALBUMIN SERPL ELPH-MCNC: 2.8 G/DL — LOW (ref 3.3–5)
ALP SERPL-CCNC: 95 U/L — SIGNIFICANT CHANGE UP (ref 40–120)
ALT FLD-CCNC: 22 U/L — SIGNIFICANT CHANGE UP (ref 10–45)
ANION GAP SERPL CALC-SCNC: 7 MMOL/L — SIGNIFICANT CHANGE UP (ref 5–17)
AST SERPL-CCNC: 18 U/L — SIGNIFICANT CHANGE UP (ref 10–40)
BILIRUB DIRECT SERPL-MCNC: 0.1 MG/DL — SIGNIFICANT CHANGE UP (ref 0–0.3)
BILIRUB INDIRECT FLD-MCNC: 0.3 MG/DL — SIGNIFICANT CHANGE UP (ref 0.2–1)
BILIRUB SERPL-MCNC: 0.4 MG/DL — SIGNIFICANT CHANGE UP (ref 0.2–1.2)
BUN SERPL-MCNC: 26 MG/DL — HIGH (ref 7–23)
CALCIUM SERPL-MCNC: 8.7 MG/DL — SIGNIFICANT CHANGE UP (ref 8.4–10.5)
CHLORIDE SERPL-SCNC: 102 MMOL/L — SIGNIFICANT CHANGE UP (ref 96–108)
CO2 SERPL-SCNC: 29 MMOL/L — SIGNIFICANT CHANGE UP (ref 22–31)
CREAT SERPL-MCNC: 0.75 MG/DL — SIGNIFICANT CHANGE UP (ref 0.5–1.3)
CREAT SERPL-MCNC: 0.76 MG/DL — SIGNIFICANT CHANGE UP (ref 0.5–1.3)
EGFR: 75 ML/MIN/1.73M2 — SIGNIFICANT CHANGE UP
EGFR: 76 ML/MIN/1.73M2 — SIGNIFICANT CHANGE UP
GLUCOSE SERPL-MCNC: 91 MG/DL — SIGNIFICANT CHANGE UP (ref 70–99)
HCT VFR BLD CALC: 39.9 % — SIGNIFICANT CHANGE UP (ref 34.5–45)
HGB BLD-MCNC: 13.1 G/DL — SIGNIFICANT CHANGE UP (ref 11.5–15.5)
INR BLD: 1.13 RATIO — SIGNIFICANT CHANGE UP (ref 0.85–1.16)
MCHC RBC-ENTMCNC: 31.3 PG — SIGNIFICANT CHANGE UP (ref 27–34)
MCHC RBC-ENTMCNC: 32.8 G/DL — SIGNIFICANT CHANGE UP (ref 32–36)
MCV RBC AUTO: 95.2 FL — SIGNIFICANT CHANGE UP (ref 80–100)
NRBC # BLD: 0 /100 WBCS — SIGNIFICANT CHANGE UP (ref 0–0)
PLATELET # BLD AUTO: 350 K/UL — SIGNIFICANT CHANGE UP (ref 150–400)
POTASSIUM SERPL-MCNC: 3.7 MMOL/L — SIGNIFICANT CHANGE UP (ref 3.5–5.3)
POTASSIUM SERPL-SCNC: 3.7 MMOL/L — SIGNIFICANT CHANGE UP (ref 3.5–5.3)
PROT SERPL-MCNC: 6.3 G/DL — SIGNIFICANT CHANGE UP (ref 6–8.3)
PROTHROM AB SERPL-ACNC: 13.3 SEC — SIGNIFICANT CHANGE UP (ref 9.9–13.4)
RBC # BLD: 4.19 M/UL — SIGNIFICANT CHANGE UP (ref 3.8–5.2)
RBC # FLD: 14.3 % — SIGNIFICANT CHANGE UP (ref 10.3–14.5)
SODIUM SERPL-SCNC: 138 MMOL/L — SIGNIFICANT CHANGE UP (ref 135–145)
WBC # BLD: 10.72 K/UL — HIGH (ref 3.8–10.5)
WBC # FLD AUTO: 10.72 K/UL — HIGH (ref 3.8–10.5)

## 2024-11-08 PROCEDURE — 99233 SBSQ HOSP IP/OBS HIGH 50: CPT

## 2024-11-08 RX ADMIN — IPRATROPIUM BROMIDE AND ALBUTEROL SULFATE 3 MILLILITER(S): .5; 2.5 SOLUTION RESPIRATORY (INHALATION) at 15:18

## 2024-11-08 RX ADMIN — DEXAMETHASONE 1.5 MG 6 MILLIGRAM(S): 1.5 TABLET ORAL at 06:31

## 2024-11-08 RX ADMIN — FOLIC ACID 1 MILLIGRAM(S): 1 TABLET ORAL at 11:59

## 2024-11-08 RX ADMIN — PETROLATUM 1 APPLICATION(S): 987.89 OINTMENT TOPICAL at 18:14

## 2024-11-08 RX ADMIN — APIXABAN 10 MILLIGRAM(S): 5 TABLET, FILM COATED ORAL at 06:31

## 2024-11-08 RX ADMIN — QUETIAPINE FUMARATE 12.5 MILLIGRAM(S): 200 TABLET ORAL at 11:58

## 2024-11-08 RX ADMIN — PETROLATUM 1 APPLICATION(S): 987.89 OINTMENT TOPICAL at 06:32

## 2024-11-08 RX ADMIN — FLUTICASONE PROPIONATE AND SALMETEROL XINAFOATE 1 DOSE(S): 230; 21 AEROSOL, METERED RESPIRATORY (INHALATION) at 21:29

## 2024-11-08 RX ADMIN — IPRATROPIUM BROMIDE AND ALBUTEROL SULFATE 3 MILLILITER(S): .5; 2.5 SOLUTION RESPIRATORY (INHALATION) at 21:29

## 2024-11-08 RX ADMIN — APIXABAN 10 MILLIGRAM(S): 5 TABLET, FILM COATED ORAL at 17:27

## 2024-11-08 RX ADMIN — IPRATROPIUM BROMIDE AND ALBUTEROL SULFATE 3 MILLILITER(S): .5; 2.5 SOLUTION RESPIRATORY (INHALATION) at 08:47

## 2024-11-08 RX ADMIN — Medication 2000 UNIT(S): at 11:58

## 2024-11-08 RX ADMIN — IPRATROPIUM BROMIDE AND ALBUTEROL SULFATE 3 MILLILITER(S): .5; 2.5 SOLUTION RESPIRATORY (INHALATION) at 02:21

## 2024-11-08 RX ADMIN — Medication 81 MILLIGRAM(S): at 12:06

## 2024-11-08 RX ADMIN — FLUTICASONE PROPIONATE AND SALMETEROL XINAFOATE 1 DOSE(S): 230; 21 AEROSOL, METERED RESPIRATORY (INHALATION) at 08:47

## 2024-11-08 RX ADMIN — Medication 3 MILLIGRAM(S): at 22:05

## 2024-11-08 RX ADMIN — Medication 100 MILLIGRAM(S): at 12:04

## 2024-11-08 NOTE — CHART NOTE - NSCHARTNOTEFT_GEN_A_CORE
received call for critical lab- blood culture positive gram stain - gram positive cocci  - patient on zosyn  - will give one dose vanco to cover for MRSA  - ID consult
NUTRITION Follow Up Note    SOURCE: Patient [X]  Medical Record [X]  Nursing Staff [X]  Family Member []    DIET: Diet, Regular (11-01-24 @ 17:50) [Active]    Patient noted with variable PO intakes, consuming 0-100% of meals per nursing flowsheets. Recommend Ensure Plus High Protein 8oz PO BID (Provides 700kcal & 40grams of Protein) to enhance PO intakes and optimize nutritional status. Electrolytes stable at this time.    EDEMA: none noted    LAST BM: 05-Nov-2024    SKIN: DTI @ left heel    WEIGHT TRENDS: 63.5 kG (11/1/24)      PERTINENT MEDICATIONS: MEDICATIONS  (STANDING):  albuterol/ipratropium for Nebulization 3 milliLiter(s) Nebulizer every 6 hours  apixaban 10 milliGRAM(s) Oral every 12 hours  aspirin  chewable 81 milliGRAM(s) Oral daily  cholecalciferol 2000 Unit(s) Oral daily  dexAMETHasone     Tablet 6 milliGRAM(s) Oral daily  fluticasone propionate/ salmeterol 100-50 MICROgram(s) Diskus 1 Dose(s) Inhalation two times a day  folic acid 1 milliGRAM(s) Oral daily  melatonin 3 milliGRAM(s) Oral at bedtime  petrolatum white Ointment 1 Application(s) Topical two times a day  QUEtiapine 12.5 milliGRAM(s) Oral daily  thiamine 100 milliGRAM(s) Oral daily    MEDICATIONS  (PRN):  acetaminophen     Tablet .. 650 milliGRAM(s) Oral every 6 hours PRN Temp greater or equal to 38C (100.4F), Mild Pain (1 - 3)  aluminum hydroxide/magnesium hydroxide/simethicone Suspension 30 milliLiter(s) Oral every 4 hours PRN Dyspepsia  ondansetron Injectable 4 milliGRAM(s) IV Push every 8 hours PRN Nausea and/or Vomiting      PERTINENT LABS:  11-08 Na138 mmol/L Glu 91 mg/dL K+ 3.7 mmol/L Cr  0.76 mg/dL BUN 26 mg/dL[H] 11-08 Alb 2.8 g/dL[L]        ESTIMATED NEEDS:   [X] No Change Since Previous Assessment    PREVIOUS NUTRITION DIAGNOSIS:  1. Severe Protein Calorie Malnutrition    NUTRITION DIAGNOSIS IS [X] Ongoing     NEW NUTRITION DIAGNOSIS: [X] Not Applicable    INTERVENTIONS:   1. Ensure Plus High Protein 8oz PO BID (Provides 700kcal & 40grams of Protein)     MONITORING & EVALUATION:   1. Weights   2. PO intakes   3. Skin integrity   4. Tolerance to diet prescription   5. Labs & POCT  6. Follow up (per protocol)    Registered Dietitian/Nutritionist Remains Available.  Jaleel Somers RD, CDN    Contact: [phone] or via [place] TEAMS
Patient requested Melatonin for sleep  Plan:  Melatonin 3mg

## 2024-11-08 NOTE — PROGRESS NOTE ADULT - ASSESSMENT
89F from NH with HO COPD, Former Smoker, Dementia with history of COPD, former smoker and dementia  Admit Nov2 for worsening confusion/AMS, Hypoxemia, COVID 19 PNA    COVID19   no pneumonia on CT w/o IC  Remdesivir 5/5 completed 11/5  on dexamethasone 8/10. will dc today as patient tolerating RA, previous hypoxia was likely due to PE  ID recs noted      Acute PE            - CTA shows b/l segmental PE, no RV strain            - BNP, Trop normal;            - TTE with normal  EF. no RV strain            - transitioned to eliquis night of 11/7 from Lovenox, h/h appears stable this am            - pulm cx noted. pulm f/u OP after DC      Mild hypoxia               Improved. now on RA              Likely due to PE              O2 sat 91% on RA              ddimer 2900.               CTA PE protocol : + PE              Duplex done on admission was neg for DVT    SIRS  Zosyn and vanco started empirically in ED. currently off vanco and zosyn  BC 1/2: coag neg staph. 2/2: GPC in clusters  Repeat BC no growth at 72hrs. however per ID, can be dced home pending final repeat BC as original BC looks like contamination  ID consult noted and appreciated    Metabolic encephalopathy 2/2 covid, hypoxia, PE: improved. now at baseline  baseline verbal with confusion per Son  CT head with no acute intracranial pathology; multi-level degenerative changes in c-spine  CT abdomen pelvis: diverticulosis gallstones, renal cyst, but no acute pathology   ABG: mild hypercapnia    COPD  not in acute exacerbation   albuterol prn and advair    lower leg pain   Bilateral dopplers unremarkable  fall precautions   PT consulted--> JASON    Xerosis  Skin precautions. Gentle skin care with mild soaps  encourage po intake.    DVT ppx  lovenox    GOC: DNR/DNI    Dispo: DC pending auth. medically cleared.  possible 24-48 hrs per CM  Updated patient's son             89F from NH with HO COPD, Former Smoker, Dementia with history of COPD, former smoker and dementia  Admit Nov2 for worsening confusion/AMS, Hypoxemia, COVID 19     COVID19   no pneumonia on CT w/o IC  Remdesivir 5/5 completed 11/5  on dexamethasone 8/10. will dc today as patient tolerating RA, previous hypoxia was likely due to PE  ID recs noted      Acute PE            - CTA shows b/l segmental PE, no RV strain            - BNP, Trop normal;            - TTE with normal  EF. no RV strain            - transitioned to eliquis night of 11/7 from Lovenox, h/h appears stable this am            - pulm cx noted. pulm f/u OP after DC      Mild hypoxia               Improved. now on RA              Likely due to PE              O2 sat 91% on RA              ddimer 2900.               CTA PE protocol : + PE              Duplex done on admission was neg for DVT    SIRS  Zosyn and vanco started empirically in ED. currently off vanco and zosyn  BC 1/2: coag neg staph. 2/2: GPC in clusters  Repeat BC no growth at 72hrs. however per ID, can be dced home pending final repeat BC as original BC looks like contamination  ID consult noted and appreciated    Metabolic encephalopathy 2/2 covid, hypoxia, PE: improved. now at baseline  baseline verbal with confusion per Son  CT head with no acute intracranial pathology; multi-level degenerative changes in c-spine  CT abdomen pelvis: diverticulosis gallstones, renal cyst, but no acute pathology   ABG: mild hypercapnia    COPD history  NOT in acute exacerbation   albuterol prn and advair    lower leg pain   Bilateral dopplers unremarkable  fall precautions   PT consulted--> JASON    Xerosis  Skin precautions. Gentle skin care with mild soaps  encourage po intake.    DVT ppx  lovenox    GOC: DNR/DNI    Dispo: DC pending auth. medically cleared.  possible 24-48 hrs per CM  Updated patient's son             89F from NH with HO COPD, Former Smoker, Dementia with history of COPD, former smoker and dementia  Admit Nov2 for worsening confusion/AMS, Hypoxemia, COVID 19     COVID19   no pneumonia on CT w/o IC  Remdesivir 5/5 completed 11/5  on dexamethasone 8/10. will dc today as patient tolerating RA, previous hypoxia was likely due to PE  ID recs noted      Acute PE            - CTA shows b/l segmental PE, no RV strain            - BNP, Trop normal;            - TTE with normal  EF. no RV strain            - transitioned to eliquis night of 11/7 from Lovenox, h/h appears stable this am            - pulm cx noted. pulm f/u OP after DC      Mild hypoxia               Improved. now on RA              Likely due to PE              O2 sat 91% on RA              ddimer 2900.               CTA PE protocol : + PE              Duplex done on admission was neg for DVT    SIRS  Zosyn and vanco started empirically in ED. currently off vanco and zosyn  BC 1/2: coag neg staph. 2/2: GPC in clusters  Repeat BC no growth at 72hrs. however per ID, can be dced home pending final repeat BC as original BC looks like contamination  ID consult noted and appreciated    Metabolic encephalopathy 2/2 covid, hypoxia, PE: improved. now at baseline  baseline verbal with confusion per Son  CT head with no acute intracranial pathology; multi-level degenerative changes in c-spine  CT abdomen pelvis: diverticulosis gallstones, renal cyst, but no acute pathology   ABG: mild hypercapnia    COPD history  NOT in acute exacerbation   albuterol prn and advair    lower leg pain   Bilateral dopplers unremarkable  fall precautions   PT consulted--> JASON    Xerosis  Skin precautions. Gentle skin care with mild soaps  encourage po intake.    DVT ppx  Eliquis    GOC: DNR/DNI    Dispo: DC pending auth. medically cleared.  possible 24-48 hrs per CM  Updated patient's son

## 2024-11-08 NOTE — PROGRESS NOTE ADULT - SUBJECTIVE AND OBJECTIVE BOX
Patient is a 89y old  Female who presents with a chief complaint of COVID PNA (07 Nov 2024 15:13)      Patient seen and examined at bedside. No overnight events reported. Patient states she is feeling much better today. Denies chest pain, sob, nausea, vomiting.     ALLERGIES:  No Known Allergies    MEDICATIONS  (STANDING):  albuterol/ipratropium for Nebulization 3 milliLiter(s) Nebulizer every 6 hours  apixaban 10 milliGRAM(s) Oral every 12 hours  aspirin  chewable 81 milliGRAM(s) Oral daily  cholecalciferol 2000 Unit(s) Oral daily  dexAMETHasone     Tablet 6 milliGRAM(s) Oral daily  fluticasone propionate/ salmeterol 100-50 MICROgram(s) Diskus 1 Dose(s) Inhalation two times a day  folic acid 1 milliGRAM(s) Oral daily  melatonin 3 milliGRAM(s) Oral at bedtime  petrolatum white Ointment 1 Application(s) Topical two times a day  QUEtiapine 12.5 milliGRAM(s) Oral daily  thiamine 100 milliGRAM(s) Oral daily    MEDICATIONS  (PRN):  acetaminophen     Tablet .. 650 milliGRAM(s) Oral every 6 hours PRN Temp greater or equal to 38C (100.4F), Mild Pain (1 - 3)  aluminum hydroxide/magnesium hydroxide/simethicone Suspension 30 milliLiter(s) Oral every 4 hours PRN Dyspepsia  ondansetron Injectable 4 milliGRAM(s) IV Push every 8 hours PRN Nausea and/or Vomiting    Vital Signs Last 24 Hrs  T(F): 97.3 (08 Nov 2024 05:17), Max: 98.2 (07 Nov 2024 19:21)  HR: 64 (08 Nov 2024 08:48) (64 - 91)  BP: 142/76 (08 Nov 2024 05:17) (108/69 - 142/76)  RR: 16 (08 Nov 2024 05:17) (16 - 16)  SpO2: 95% (08 Nov 2024 08:48) (92% - 95%)  I&O's Summary    07 Nov 2024 07:01  -  08 Nov 2024 07:00  --------------------------------------------------------  IN: 120 mL / OUT: 400 mL / NET: -280 mL      PHYSICAL EXAM:  General: NAD, A/O x 3  ENT: [place], Moist mucous membranes, no thrush  Neck: Supple, No JVD  Lungs: Clear to auscultation bilaterally, good air entry, non-labored breathing  Cardio: RRR, S1/S2, + murmur  Abdomen: Soft, Nontender, Nondistended; Bowel sounds present  Extremities: No calf tenderness, No cyanosis, No pitting edema  Psych: Appropriate mood and affect    LABS:                        13.1   10.72 )-----------( 350      ( 08 Nov 2024 06:52 )             39.9     11-08    138  |  102  |  26  ----------------------------<  91  3.7   |  29  |  0.76    Ca    8.7      08 Nov 2024 06:52    TPro  6.3  /  Alb  2.8  /  TBili  0.4  /  DBili  0.1  /  AST  18  /  ALT  22  /  AlkPhos  95  11-08          PT/INR - ( 08 Nov 2024 06:52 )   PT: 13.3 sec;   INR: 1.13 ratio                                     Urinalysis Basic - ( 08 Nov 2024 06:52 )    Color: x / Appearance: x / SG: x / pH: x  Gluc: 91 mg/dL / Ketone: x  / Bili: x / Urobili: x   Blood: x / Protein: x / Nitrite: x   Leuk Esterase: x / RBC: x / WBC x   Sq Epi: x / Non Sq Epi: x / Bacteria: x        Culture - Blood (collected 04 Nov 2024 10:11)  Source: .Blood BLOOD  Preliminary Report (07 Nov 2024 14:01):    No growth at 72 Hours    Culture - Blood (collected 04 Nov 2024 10:04)  Source: .Blood BLOOD  Preliminary Report (07 Nov 2024 14:01):    No growth at 72 Hours    Culture - Urine (collected 01 Nov 2024 22:40)  Source: Clean Catch Clean Catch (Midstream)  Final Report (04 Nov 2024 00:57):    >100,000 CFU/ml Streptococcus agalactiae (Group B)    Group B streptococci are susceptible to ampicillin,    penicillin and cefazolin, but may be resistant to    erythromycin and clindamycin.    Culture - Blood (collected 01 Nov 2024 18:00)  Source: .Blood BLOOD  Gram Stain (02 Nov 2024 18:01):    Growth in aerobic and anaerobic bottles: Gram Positive Cocci in Clusters  Final Report (04 Nov 2024 09:52):    Growth in anaerobic bottle: Staphylococcus epidermidis "Susceptibilities    not performed"    Growth in aerobic bottle: Staphylococcus capitis  Organism: Staphylococcus capitis (04 Nov 2024 09:52)  Organism: Staphylococcus capitis (04 Nov 2024 09:52)      Method Type: BEBETO      -  Clindamycin: S <=0.25      -  Erythromycin: S <=0.25      -  Gentamicin: S <=1 Should not be used as monotherapy      -  Oxacillin: R >2      -  Penicillin: R 2      -  Rifampin: S <=1 Should not be used as monotherapy      -  Tetracycline: S <=1      -  Trimethoprim/Sulfamethoxazole: S <=0.5/9.5      -  Vancomycin: S 1    Culture - Blood (collected 01 Nov 2024 18:00)  Source: .Blood BLOOD  Gram Stain (03 Nov 2024 14:20):    Growth in aerobic bottle: Gram positive cocci in pairs    Growth in anaerobic bottle: Gram positive cocci in pairs  Final Report (04 Nov 2024 13:53):    Growth in aerobic and anaerobic bottles: Staphylococcus capitis    "Susceptibilities not performed"    Direct identification is available within approximately 3-5    hours either by Blood Panel Multiplexed PCR or Direct    MALDI-TOF. Details: [email or web address]  Organism: Blood Culture PCR (03 Nov 2024 14:20)  Organism: Blood Culture PCR (03 Nov 2024 14:20)      Method Type: PCR      -  Coagulase negative Staphylococcus: Detec        RADIOLOGY & ADDITIONAL TESTS:    Care Discussed with Consultants/Other Providers:

## 2024-11-09 LAB
ALBUMIN SERPL ELPH-MCNC: 2.8 G/DL — LOW (ref 3.3–5)
ALP SERPL-CCNC: 91 U/L — SIGNIFICANT CHANGE UP (ref 40–120)
ALT FLD-CCNC: 20 U/L — SIGNIFICANT CHANGE UP (ref 10–45)
ANION GAP SERPL CALC-SCNC: 7 MMOL/L — SIGNIFICANT CHANGE UP (ref 5–17)
AST SERPL-CCNC: 23 U/L — SIGNIFICANT CHANGE UP (ref 10–40)
BILIRUB DIRECT SERPL-MCNC: 0.1 MG/DL — SIGNIFICANT CHANGE UP (ref 0–0.3)
BILIRUB INDIRECT FLD-MCNC: 0.5 MG/DL — SIGNIFICANT CHANGE UP (ref 0.2–1)
BILIRUB SERPL-MCNC: 0.6 MG/DL — SIGNIFICANT CHANGE UP (ref 0.2–1.2)
BUN SERPL-MCNC: 19 MG/DL — SIGNIFICANT CHANGE UP (ref 7–23)
CALCIUM SERPL-MCNC: 8.7 MG/DL — SIGNIFICANT CHANGE UP (ref 8.4–10.5)
CHLORIDE SERPL-SCNC: 103 MMOL/L — SIGNIFICANT CHANGE UP (ref 96–108)
CO2 SERPL-SCNC: 28 MMOL/L — SIGNIFICANT CHANGE UP (ref 22–31)
CREAT SERPL-MCNC: 0.76 MG/DL — SIGNIFICANT CHANGE UP (ref 0.5–1.3)
CULTURE RESULTS: SIGNIFICANT CHANGE UP
CULTURE RESULTS: SIGNIFICANT CHANGE UP
EGFR: 75 ML/MIN/1.73M2 — SIGNIFICANT CHANGE UP
GLUCOSE SERPL-MCNC: 89 MG/DL — SIGNIFICANT CHANGE UP (ref 70–99)
HCT VFR BLD CALC: 41.5 % — SIGNIFICANT CHANGE UP (ref 34.5–45)
HGB BLD-MCNC: 13.7 G/DL — SIGNIFICANT CHANGE UP (ref 11.5–15.5)
INR BLD: 1.55 RATIO — HIGH (ref 0.85–1.16)
MCHC RBC-ENTMCNC: 30.9 PG — SIGNIFICANT CHANGE UP (ref 27–34)
MCHC RBC-ENTMCNC: 33 G/DL — SIGNIFICANT CHANGE UP (ref 32–36)
MCV RBC AUTO: 93.7 FL — SIGNIFICANT CHANGE UP (ref 80–100)
NRBC # BLD: 0 /100 WBCS — SIGNIFICANT CHANGE UP (ref 0–0)
PLATELET # BLD AUTO: 336 K/UL — SIGNIFICANT CHANGE UP (ref 150–400)
POTASSIUM SERPL-MCNC: 3.8 MMOL/L — SIGNIFICANT CHANGE UP (ref 3.5–5.3)
POTASSIUM SERPL-SCNC: 3.8 MMOL/L — SIGNIFICANT CHANGE UP (ref 3.5–5.3)
PROT SERPL-MCNC: 6.3 G/DL — SIGNIFICANT CHANGE UP (ref 6–8.3)
PROTHROM AB SERPL-ACNC: 18.2 SEC — HIGH (ref 9.9–13.4)
RBC # BLD: 4.43 M/UL — SIGNIFICANT CHANGE UP (ref 3.8–5.2)
RBC # FLD: 14.4 % — SIGNIFICANT CHANGE UP (ref 10.3–14.5)
SODIUM SERPL-SCNC: 138 MMOL/L — SIGNIFICANT CHANGE UP (ref 135–145)
SPECIMEN SOURCE: SIGNIFICANT CHANGE UP
SPECIMEN SOURCE: SIGNIFICANT CHANGE UP
WBC # BLD: 10.81 K/UL — HIGH (ref 3.8–10.5)
WBC # FLD AUTO: 10.81 K/UL — HIGH (ref 3.8–10.5)

## 2024-11-09 PROCEDURE — 99232 SBSQ HOSP IP/OBS MODERATE 35: CPT

## 2024-11-09 RX ADMIN — FLUTICASONE PROPIONATE AND SALMETEROL XINAFOATE 1 DOSE(S): 230; 21 AEROSOL, METERED RESPIRATORY (INHALATION) at 09:33

## 2024-11-09 RX ADMIN — Medication 81 MILLIGRAM(S): at 11:56

## 2024-11-09 RX ADMIN — PETROLATUM 1 APPLICATION(S): 987.89 OINTMENT TOPICAL at 06:35

## 2024-11-09 RX ADMIN — IPRATROPIUM BROMIDE AND ALBUTEROL SULFATE 3 MILLILITER(S): .5; 2.5 SOLUTION RESPIRATORY (INHALATION) at 16:50

## 2024-11-09 RX ADMIN — IPRATROPIUM BROMIDE AND ALBUTEROL SULFATE 3 MILLILITER(S): .5; 2.5 SOLUTION RESPIRATORY (INHALATION) at 21:36

## 2024-11-09 RX ADMIN — APIXABAN 10 MILLIGRAM(S): 5 TABLET, FILM COATED ORAL at 06:23

## 2024-11-09 RX ADMIN — DEXAMETHASONE 1.5 MG 6 MILLIGRAM(S): 1.5 TABLET ORAL at 06:23

## 2024-11-09 RX ADMIN — IPRATROPIUM BROMIDE AND ALBUTEROL SULFATE 3 MILLILITER(S): .5; 2.5 SOLUTION RESPIRATORY (INHALATION) at 05:11

## 2024-11-09 RX ADMIN — FOLIC ACID 1 MILLIGRAM(S): 1 TABLET ORAL at 11:55

## 2024-11-09 RX ADMIN — PETROLATUM 1 APPLICATION(S): 987.89 OINTMENT TOPICAL at 17:35

## 2024-11-09 RX ADMIN — APIXABAN 10 MILLIGRAM(S): 5 TABLET, FILM COATED ORAL at 17:27

## 2024-11-09 RX ADMIN — QUETIAPINE FUMARATE 12.5 MILLIGRAM(S): 200 TABLET ORAL at 11:56

## 2024-11-09 RX ADMIN — Medication 100 MILLIGRAM(S): at 11:55

## 2024-11-09 RX ADMIN — Medication 2000 UNIT(S): at 11:55

## 2024-11-09 RX ADMIN — Medication 3 MILLIGRAM(S): at 21:59

## 2024-11-09 RX ADMIN — FLUTICASONE PROPIONATE AND SALMETEROL XINAFOATE 1 DOSE(S): 230; 21 AEROSOL, METERED RESPIRATORY (INHALATION) at 21:36

## 2024-11-09 RX ADMIN — IPRATROPIUM BROMIDE AND ALBUTEROL SULFATE 3 MILLILITER(S): .5; 2.5 SOLUTION RESPIRATORY (INHALATION) at 09:34

## 2024-11-09 NOTE — PROGRESS NOTE ADULT - SUBJECTIVE AND OBJECTIVE BOX
Patient is a 89y old  Female who presents with a chief complaint of COVID PNA (08 Nov 2024 10:11)      Patient seen and examined at bedside. No overnight events reported.     ALLERGIES:  No Known Allergies    MEDICATIONS  (STANDING):  albuterol/ipratropium for Nebulization 3 milliLiter(s) Nebulizer every 6 hours  apixaban 10 milliGRAM(s) Oral every 12 hours  aspirin  chewable 81 milliGRAM(s) Oral daily  cholecalciferol 2000 Unit(s) Oral daily  fluticasone propionate/ salmeterol 100-50 MICROgram(s) Diskus 1 Dose(s) Inhalation two times a day  folic acid 1 milliGRAM(s) Oral daily  melatonin 3 milliGRAM(s) Oral at bedtime  petrolatum white Ointment 1 Application(s) Topical two times a day  QUEtiapine 12.5 milliGRAM(s) Oral daily  thiamine 100 milliGRAM(s) Oral daily    MEDICATIONS  (PRN):  acetaminophen     Tablet .. 650 milliGRAM(s) Oral every 6 hours PRN Temp greater or equal to 38C (100.4F), Mild Pain (1 - 3)  aluminum hydroxide/magnesium hydroxide/simethicone Suspension 30 milliLiter(s) Oral every 4 hours PRN Dyspepsia  ondansetron Injectable 4 milliGRAM(s) IV Push every 8 hours PRN Nausea and/or Vomiting    Vital Signs Last 24 Hrs  T(F): 98.7 (09 Nov 2024 05:18), Max: 99.4 (08 Nov 2024 19:10)  HR: 70 (09 Nov 2024 05:18) (64 - 72)  BP: 114/73 (09 Nov 2024 05:18) (114/73 - 124/73)  RR: 16 (09 Nov 2024 05:18) (16 - 16)  SpO2: 95% (09 Nov 2024 05:18) (95% - 95%)  I&O's Summary    08 Nov 2024 07:01  -  09 Nov 2024 07:00  --------------------------------------------------------  IN: 300 mL / OUT: 900 mL / NET: -600 mL      PHYSICAL EXAM:  General: NAD, A/O x 3  ENT: No gross hearing impairment, Moist mucous membranes, no thrush  Neck: Supple, No JVD  Lungs: Clear to auscultation bilaterally, good air entry, non-labored breathing  Cardio: RRR, S1/S2, No murmur  Abdomen: Soft, Nontender, Nondistended; Bowel sounds present  Extremities: No calf tenderness, No cyanosis, No pitting edema  Psych: Appropriate mood and affect    LABS:                        13.7   10.81 )-----------( 336      ( 09 Nov 2024 07:08 )             41.5     11-09    138  |  103  |  19  ----------------------------<  89  3.8   |  28  |  0.76    Ca    8.7      09 Nov 2024 07:08    TPro  6.3  /  Alb  2.8  /  TBili  0.6  /  DBili  0.1  /  AST  23  /  ALT  20  /  AlkPhos  91  11-09          PT/INR - ( 09 Nov 2024 07:08 )   PT: 18.2 sec;   INR: 1.55 ratio                                     Urinalysis Basic - ( 09 Nov 2024 07:08 )    Color: x / Appearance: x / SG: x / pH: x  Gluc: 89 mg/dL / Ketone: x  / Bili: x / Urobili: x   Blood: x / Protein: x / Nitrite: x   Leuk Esterase: x / RBC: x / WBC x   Sq Epi: x / Non Sq Epi: x / Bacteria: x        Culture - Blood (collected 04 Nov 2024 10:11)  Source: .Blood BLOOD  Preliminary Report (08 Nov 2024 14:00):    No growth at 4 days    Culture - Blood (collected 04 Nov 2024 10:04)  Source: .Blood BLOOD  Preliminary Report (08 Nov 2024 14:00):    No growth at 4 days        RADIOLOGY & ADDITIONAL TESTS:  < from: CT Angio Chest PE Protocol w/ IV Cont (11.04.24 @ 16:07) >    IMPRESSION:  Bilateral segmental pulmonary emboli without evidence of cardiac strain.  Mild emphysema.    < end of copied text >    Care Discussed with Consultants/Other Providers:    Patient is a 89y old  Female who presents with a chief complaint of COVID PNA (08 Nov 2024 10:11)      Patient seen and examined at bedside. No overnight events reported.   Pt states "I am grand".      ALLERGIES:  No Known Allergies    MEDICATIONS  (STANDING):  albuterol/ipratropium for Nebulization 3 milliLiter(s) Nebulizer every 6 hours  apixaban 10 milliGRAM(s) Oral every 12 hours  aspirin  chewable 81 milliGRAM(s) Oral daily  cholecalciferol 2000 Unit(s) Oral daily  fluticasone propionate/ salmeterol 100-50 MICROgram(s) Diskus 1 Dose(s) Inhalation two times a day  folic acid 1 milliGRAM(s) Oral daily  melatonin 3 milliGRAM(s) Oral at bedtime  petrolatum white Ointment 1 Application(s) Topical two times a day  QUEtiapine 12.5 milliGRAM(s) Oral daily  thiamine 100 milliGRAM(s) Oral daily    MEDICATIONS  (PRN):  acetaminophen     Tablet .. 650 milliGRAM(s) Oral every 6 hours PRN Temp greater or equal to 38C (100.4F), Mild Pain (1 - 3)  aluminum hydroxide/magnesium hydroxide/simethicone Suspension 30 milliLiter(s) Oral every 4 hours PRN Dyspepsia  ondansetron Injectable 4 milliGRAM(s) IV Push every 8 hours PRN Nausea and/or Vomiting    Vital Signs Last 24 Hrs  T(F): 98.7 (09 Nov 2024 05:18), Max: 99.4 (08 Nov 2024 19:10)  HR: 70 (09 Nov 2024 05:18) (64 - 72)  BP: 114/73 (09 Nov 2024 05:18) (114/73 - 124/73)  RR: 16 (09 Nov 2024 05:18) (16 - 16)  SpO2: 95% (09 Nov 2024 05:18) (95% - 95%)  I&O's Summary    08 Nov 2024 07:01  -  09 Nov 2024 07:00  --------------------------------------------------------  IN: 300 mL / OUT: 900 mL / NET: -600 mL      PHYSICAL EXAM:  General: NAD, pleasantly confused.   ENT: No gross hearing impairment, Moist mucous membranes, no thrush  Neck: Supple, No JVD  Lungs: non-labored breathing, decreased breath sounds at bases  Cardio: RRR, S1/S2, No murmur  Abdomen: Soft, Nontender, Nondistended; Bowel sounds present  Extremities: No calf tenderness, No cyanosis, No pitting edema  Neuro: alert, follows commands    LABS:                        13.7   10.81 )-----------( 336      ( 09 Nov 2024 07:08 )             41.5     11-09    138  |  103  |  19  ----------------------------<  89  3.8   |  28  |  0.76    Ca    8.7      09 Nov 2024 07:08    TPro  6.3  /  Alb  2.8  /  TBili  0.6  /  DBili  0.1  /  AST  23  /  ALT  20  /  AlkPhos  91  11-09          PT/INR - ( 09 Nov 2024 07:08 )   PT: 18.2 sec;   INR: 1.55 ratio                                     Urinalysis Basic - ( 09 Nov 2024 07:08 )    Color: x / Appearance: x / SG: x / pH: x  Gluc: 89 mg/dL / Ketone: x  / Bili: x / Urobili: x   Blood: x / Protein: x / Nitrite: x   Leuk Esterase: x / RBC: x / WBC x   Sq Epi: x / Non Sq Epi: x / Bacteria: x        Culture - Blood (collected 04 Nov 2024 10:11)  Source: .Blood BLOOD  Preliminary Report (08 Nov 2024 14:00):    No growth at 4 days    Culture - Blood (collected 04 Nov 2024 10:04)  Source: .Blood BLOOD  Preliminary Report (08 Nov 2024 14:00):    No growth at 4 days        RADIOLOGY & ADDITIONAL TESTS:  < from: CT Angio Chest PE Protocol w/ IV Cont (11.04.24 @ 16:07) >    IMPRESSION:  Bilateral segmental pulmonary emboli without evidence of cardiac strain.  Mild emphysema.    < end of copied text >    Care Discussed with Consultants/Other Providers:

## 2024-11-09 NOTE — PROGRESS NOTE ADULT - ASSESSMENT
89F from NH with Hx of COPD, Former Smoker, Dementia with history of COPD, former smoker and dementia  Admit Nov2 for worsening confusion/AMS, Hypoxemia, COVID 19.    COVID + 11/1  no pneumonia on CT;   Completed Remdesivr course and completed Decadron, she does not require supplemental O2  ID recs noted    Acute PE            - CTA shows b/l segmental PE, no RV strain            - BNP, Trop normal;            - TTE with normal  EF. no RV strain            - transitioned to eliquis night of 11/7 from Lovenox, h/h appears stable this am            - pulm cx noted. pulm f/u OP after DC      Mild hypoxia               Improved. now on RA              Likely due to PE              O2 sat 91% on RA              ddimer 2900.               CTA PE protocol : + PE              Duplex done on admission was neg for DVT    SIRS  Zosyn and vanco started empirically in ED. currently off vanco and zosyn  BC 1/2: coag neg staph. 2/2: GPC in clusters  Repeat BC no growth at 72hrs.  ID consult noted and appreciated    Metabolic encephalopathy 2/2 covid, hypoxia, PE: improved. now at baseline  baseline verbal with confusion per Son  CT head with no acute intracranial pathology; multi-level degenerative changes in c-spine  CT abdomen pelvis: diverticulosis gallstones, renal cyst, but no acute pathology   ABG: mild hypercapnia    COPD history  NOT in acute exacerbation   albuterol prn and advair    lower leg pain   Bilateral dopplers unremarkable  fall precautions   PT consulted--> [facility]    Xerosis  Skin precautions. Gentle skin care with mild soaps  encourage po intake.    DVT ppx  lovenox    GOC: DNR/DNI    Dispo: DC pending auth. for [facility], Son will be updated           89F from NH with Hx of COPD, Former Smoker, Dementia with history of COPD, former smoker and dementia.  She was admitted with worsening confusion/AMS, Hypoxemia, COVID 19.    COVID + 11/1  no pneumonia on CT;   Completed Remdesivr course and completed Decadron, she does not require supplemental O2  ID recs noted    Acute PE            - CTA shows b/l segmental PE, no RV strain            - BNP, Trop normal;            - TTE with normal  EF. no RV strain            - transitioned to eliquis night of 11/7 from Lovenox, h/h appears stable this am            - pulm cx noted. pulm f/u OP after DC      Mild hypoxia               Improved. now on RA              Likely due to PE              O2 sat 91% on RA              ddimer 2900.               CTA PE protocol : + PE              Duplex done on admission was neg for DVT    SIRS  Zosyn and vanco started empirically in ED. currently off vanco and zosyn  BC 1/2: coag neg staph. 2/2: GPC in clusters  Repeat BC no growth at 72hrs.  ID consult noted and appreciated    Metabolic encephalopathy 2/2 covid, hypoxia, PE: improved. now at baseline  baseline verbal with confusion per Son  CT head with no acute intracranial pathology; multi-level degenerative changes in c-spine  CT abdomen pelvis: diverticulosis gallstones, renal cyst, but no acute pathology   ABG: mild hypercapnia    COPD history  NOT in acute exacerbation   albuterol prn and advair    lower leg pain   Bilateral dopplers unremarkable  fall precautions   PT consulted--> [facility]    Xerosis  Skin precautions. Gentle skin care with mild soaps  encourage po intake.    DVT ppx  lovenox    GOC: DNR/DNI    Dispo: DC pending auth. for [facility], Son will be updated           89F from NH with Hx of COPD, Former Smoker, Dementia with history of COPD, former smoker and dementia.  She was admitted with worsening confusion/AMS, Hypoxemia, COVID 19.    COVID + 11/1  no pneumonia on CT;   Completed Remdesivir course and completed Decadron, she does not require supplemental O2  ID recs noted    Acute PE            - CTA shows b/l segmental PE, no RV strain            - BNP, Trop normal;            - TTE with normal  EF. no RV strain            - transitioned to eliquis night of 11/7 from Lovenox, h/h appears stable this am            - pulm cx noted. pulm f/u OP after DC      Mild hypoxia               Improved. now on RA              Likely due to PE              O2 sat 91% on RA              ddimer 2900.               CTA PE protocol : + PE              Duplex done on admission was neg for DVT    SIRS  Zosyn and vanco started empirically in ED. currently off vanco and zosyn  BC 1/2: coag neg staph. 2/2: GPC in clusters  Repeat BC no growth   ID consult noted and appreciated    Metabolic encephalopathy 2/2 covid, hypoxia, PE: improved. now at baseline  baseline verbal with confusion per Son  CT head with no acute intracranial pathology; multi-level degenerative changes in c-spine  CT abdomen pelvis: diverticulosis gallstones, renal cyst, but no acute pathology   ABG: mild hypercapnia    COPD history  NOT in acute exacerbation   albuterol prn and advair    lower leg pain   Bilateral dopplers unremarkable  fall precautions   PT consulted--> [facility]    Xerosis  Skin precautions. Gentle skin care with mild soaps  encourage po intake.    DVT ppx  Eliquis     GOC: DNR/DNI    Dispo: DC pending auth. for [facility], Son will be updated

## 2024-11-10 LAB
HCT VFR BLD CALC: 42.5 % — SIGNIFICANT CHANGE UP (ref 34.5–45)
HGB BLD-MCNC: 13.5 G/DL — SIGNIFICANT CHANGE UP (ref 11.5–15.5)
MCHC RBC-ENTMCNC: 30.8 PG — SIGNIFICANT CHANGE UP (ref 27–34)
MCHC RBC-ENTMCNC: 31.8 G/DL — LOW (ref 32–36)
MCV RBC AUTO: 97 FL — SIGNIFICANT CHANGE UP (ref 80–100)
NRBC # BLD: 0 /100 WBCS — SIGNIFICANT CHANGE UP (ref 0–0)
PLATELET # BLD AUTO: 347 K/UL — SIGNIFICANT CHANGE UP (ref 150–400)
RBC # BLD: 4.38 M/UL — SIGNIFICANT CHANGE UP (ref 3.8–5.2)
RBC # FLD: 14.7 % — HIGH (ref 10.3–14.5)
WBC # BLD: 12.36 K/UL — HIGH (ref 3.8–10.5)
WBC # FLD AUTO: 12.36 K/UL — HIGH (ref 3.8–10.5)

## 2024-11-10 PROCEDURE — 99232 SBSQ HOSP IP/OBS MODERATE 35: CPT

## 2024-11-10 RX ADMIN — APIXABAN 10 MILLIGRAM(S): 5 TABLET, FILM COATED ORAL at 17:03

## 2024-11-10 RX ADMIN — FLUTICASONE PROPIONATE AND SALMETEROL XINAFOATE 1 DOSE(S): 230; 21 AEROSOL, METERED RESPIRATORY (INHALATION) at 21:38

## 2024-11-10 RX ADMIN — Medication 3 MILLIGRAM(S): at 21:02

## 2024-11-10 RX ADMIN — IPRATROPIUM BROMIDE AND ALBUTEROL SULFATE 3 MILLILITER(S): .5; 2.5 SOLUTION RESPIRATORY (INHALATION) at 21:37

## 2024-11-10 RX ADMIN — QUETIAPINE FUMARATE 12.5 MILLIGRAM(S): 200 TABLET ORAL at 11:53

## 2024-11-10 RX ADMIN — IPRATROPIUM BROMIDE AND ALBUTEROL SULFATE 3 MILLILITER(S): .5; 2.5 SOLUTION RESPIRATORY (INHALATION) at 09:09

## 2024-11-10 RX ADMIN — PETROLATUM 1 APPLICATION(S): 987.89 OINTMENT TOPICAL at 17:16

## 2024-11-10 RX ADMIN — Medication 81 MILLIGRAM(S): at 11:53

## 2024-11-10 RX ADMIN — IPRATROPIUM BROMIDE AND ALBUTEROL SULFATE 3 MILLILITER(S): .5; 2.5 SOLUTION RESPIRATORY (INHALATION) at 05:18

## 2024-11-10 RX ADMIN — Medication 2000 UNIT(S): at 11:54

## 2024-11-10 RX ADMIN — FLUTICASONE PROPIONATE AND SALMETEROL XINAFOATE 1 DOSE(S): 230; 21 AEROSOL, METERED RESPIRATORY (INHALATION) at 09:09

## 2024-11-10 RX ADMIN — IPRATROPIUM BROMIDE AND ALBUTEROL SULFATE 3 MILLILITER(S): .5; 2.5 SOLUTION RESPIRATORY (INHALATION) at 15:44

## 2024-11-10 RX ADMIN — FOLIC ACID 1 MILLIGRAM(S): 1 TABLET ORAL at 11:54

## 2024-11-10 RX ADMIN — Medication 100 MILLIGRAM(S): at 11:53

## 2024-11-10 RX ADMIN — APIXABAN 10 MILLIGRAM(S): 5 TABLET, FILM COATED ORAL at 06:08

## 2024-11-10 RX ADMIN — PETROLATUM 1 APPLICATION(S): 987.89 OINTMENT TOPICAL at 06:10

## 2024-11-10 NOTE — PROGRESS NOTE ADULT - SUBJECTIVE AND OBJECTIVE BOX
Patient is a 89y old  Female who presents with a chief complaint of COVID PNA (09 Nov 2024 08:15)      Patient seen and examined at bedside. No overnight events reported.     ALLERGIES:  No Known Allergies    MEDICATIONS  (STANDING):  albuterol/ipratropium for Nebulization 3 milliLiter(s) Nebulizer every 6 hours  apixaban 10 milliGRAM(s) Oral every 12 hours  aspirin  chewable 81 milliGRAM(s) Oral daily  cholecalciferol 2000 Unit(s) Oral daily  fluticasone propionate/ salmeterol 100-50 MICROgram(s) Diskus 1 Dose(s) Inhalation two times a day  folic acid 1 milliGRAM(s) Oral daily  melatonin 3 milliGRAM(s) Oral at bedtime  petrolatum white Ointment 1 Application(s) Topical two times a day  QUEtiapine 12.5 milliGRAM(s) Oral daily  thiamine 100 milliGRAM(s) Oral daily    MEDICATIONS  (PRN):  acetaminophen     Tablet .. 650 milliGRAM(s) Oral every 6 hours PRN Temp greater or equal to 38C (100.4F), Mild Pain (1 - 3)  aluminum hydroxide/magnesium hydroxide/simethicone Suspension 30 milliLiter(s) Oral every 4 hours PRN Dyspepsia  ondansetron Injectable 4 milliGRAM(s) IV Push every 8 hours PRN Nausea and/or Vomiting    Vital Signs Last 24 Hrs  T(F): 97.5 (10 Nov 2024 05:35), Max: 97.7 (09 Nov 2024 20:19)  HR: 83 (10 Nov 2024 05:35) (73 - 90)  BP: 133/72 (10 Nov 2024 05:35) (108/61 - 133/72)  RR: 17 (10 Nov 2024 05:35) (17 - 17)  SpO2: 97% (10 Nov 2024 05:35) (95% - 99%)  I&O's Summary    09 Nov 2024 07:01  -  10 Nov 2024 07:00  --------------------------------------------------------  IN: 0 mL / OUT: 900 mL / NET: -900 mL      PHYSICAL EXAM:  General: NAD, A/O x 3  ENT: No gross hearing impairment, Moist mucous membranes, no thrush  Neck: Supple, No JVD  Lungs: Clear to auscultation bilaterally, good air entry, non-labored breathing  Cardio: RRR, S1/S2, No murmur  Abdomen: Soft, Nontender, Nondistended; Bowel sounds present  Extremities: No calf tenderness, No cyanosis, No pitting edema  Psych: Appropriate mood and affect    LABS:                        13.7   10.81 )-----------( 336      ( 09 Nov 2024 07:08 )             41.5     11-09    138  |  103  |  19  ----------------------------<  89  3.8   |  28  |  0.76    Ca    8.7      09 Nov 2024 07:08    TPro  6.3  /  Alb  2.8  /  TBili  0.6  /  DBili  0.1  /  AST  23  /  ALT  20  /  AlkPhos  91  11-09          PT/INR - ( 09 Nov 2024 07:08 )   PT: 18.2 sec;   INR: 1.55 ratio                                     Urinalysis Basic - ( 09 Nov 2024 07:08 )    Color: x / Appearance: x / SG: x / pH: x  Gluc: 89 mg/dL / Ketone: x  / Bili: x / Urobili: x   Blood: x / Protein: x / Nitrite: x   Leuk Esterase: x / RBC: x / WBC x   Sq Epi: x / Non Sq Epi: x / Bacteria: x        Culture - Blood (collected 04 Nov 2024 10:11)  Source: .Blood BLOOD  Final Report (09 Nov 2024 14:00):    No growth at 5 days    Culture - Blood (collected 04 Nov 2024 10:04)  Source: .Blood BLOOD  Final Report (09 Nov 2024 14:00):    No growth at 5 days        RADIOLOGY & ADDITIONAL TESTS:    Care Discussed with Consultants/Other Providers:    Patient is a 89y old  Female who presents with a chief complaint of COVID PNA (09 Nov 2024 08:15)      Patient seen and examined at bedside. No overnight events reported.   She has no complaints.     ALLERGIES:  No Known Allergies    MEDICATIONS  (STANDING):  albuterol/ipratropium for Nebulization 3 milliLiter(s) Nebulizer every 6 hours  apixaban 10 milliGRAM(s) Oral every 12 hours  aspirin  chewable 81 milliGRAM(s) Oral daily  cholecalciferol 2000 Unit(s) Oral daily  fluticasone propionate/ salmeterol 100-50 MICROgram(s) Diskus 1 Dose(s) Inhalation two times a day  folic acid 1 milliGRAM(s) Oral daily  melatonin 3 milliGRAM(s) Oral at bedtime  petrolatum white Ointment 1 Application(s) Topical two times a day  QUEtiapine 12.5 milliGRAM(s) Oral daily  thiamine 100 milliGRAM(s) Oral daily    MEDICATIONS  (PRN):  acetaminophen     Tablet .. 650 milliGRAM(s) Oral every 6 hours PRN Temp greater or equal to 38C (100.4F), Mild Pain (1 - 3)  aluminum hydroxide/magnesium hydroxide/simethicone Suspension 30 milliLiter(s) Oral every 4 hours PRN Dyspepsia  ondansetron Injectable 4 milliGRAM(s) IV Push every 8 hours PRN Nausea and/or Vomiting    Vital Signs Last 24 Hrs  T(F): 97.5 (10 Nov 2024 05:35), Max: 97.7 (09 Nov 2024 20:19)  HR: 83 (10 Nov 2024 05:35) (73 - 90)  BP: 133/72 (10 Nov 2024 05:35) (108/61 - 133/72)  RR: 17 (10 Nov 2024 05:35) (17 - 17)  SpO2: 97% (10 Nov 2024 05:35) (95% - 99%)  I&O's Summary    09 Nov 2024 07:01  -  10 Nov 2024 07:00  --------------------------------------------------------  IN: 0 mL / OUT: 900 mL / NET: -900 mL      PHYSICAL EXAM:  General: NAD, alert but confused.   ENT: No gross hearing impairment, Moist mucous membranes, no thrush  Neck: Supple, No JVD  Lungs: Clear to auscultation bilaterally, good air entry, non-labored breathing  Cardio: RRR, S1/S2, No murmur  Abdomen: Soft, Nontender, Nondistended; Bowel sounds present  Extremities: No calf tenderness, No cyanosis, No pitting edema  Psych: Appropriate mood and affect    LABS:                        13.7   10.81 )-----------( 336      ( 09 Nov 2024 07:08 )             41.5     11-09    138  |  103  |  19  ----------------------------<  89  3.8   |  28  |  0.76    Ca    8.7      09 Nov 2024 07:08    TPro  6.3  /  Alb  2.8  /  TBili  0.6  /  DBili  0.1  /  AST  23  /  ALT  20  /  AlkPhos  91  11-09          PT/INR - ( 09 Nov 2024 07:08 )   PT: 18.2 sec;   INR: 1.55 ratio                                     Urinalysis Basic - ( 09 Nov 2024 07:08 )    Color: x / Appearance: x / SG: x / pH: x  Gluc: 89 mg/dL / Ketone: x  / Bili: x / Urobili: x   Blood: x / Protein: x / Nitrite: x   Leuk Esterase: x / RBC: x / WBC x   Sq Epi: x / Non Sq Epi: x / Bacteria: x        Culture - Blood (collected 04 Nov 2024 10:11)  Source: .Blood BLOOD  Final Report (09 Nov 2024 14:00):    No growth at 5 days    Culture - Blood (collected 04 Nov 2024 10:04)  Source: .Blood BLOOD  Final Report (09 Nov 2024 14:00):    No growth at 5 days        RADIOLOGY & ADDITIONAL TESTS:    Care Discussed with Consultants/Other Providers:

## 2024-11-10 NOTE — PROGRESS NOTE ADULT - ASSESSMENT
89F from NH with Hx of COPD, Former Smoker, Dementia with history of COPD, former smoker and dementia.  She was admitted with worsening confusion/AMS, Hypoxemia, COVID 19.    COVID + 11/1  no pneumonia on CT;   Completed Remdesivir course and completed Decadron, she does not require supplemental O2  ID recs noted    Acute PE            - CTA shows b/l segmental PE, no RV strain            - BNP, Trop normal;            - TTE with normal  EF. no RV strain            - transitioned to eliquis night of 11/7 from Lovenox, h/h appears stable this am            - pulm cx noted. pulm f/u OP after DC  Mild hypoxia               Improved. now on RA              Likely due to PE              O2 sat 91% on RA              ddimer 2900.               CTA PE protocol : + PE              Duplex done on admission was neg for DVT    SIRS  Zosyn and vanco started empirically in ED. currently off vanco and zosyn  BC 1/2: coag neg staph. 2/2: GPC in clusters  Repeat BC no growth   ID consult noted and appreciated    Metabolic encephalopathy 2/2 covid, hypoxia, PE: improved. now at baseline  baseline verbal with confusion per Son  CT head with no acute intracranial pathology; multi-level degenerative changes in c-spine  CT abdomen pelvis: diverticulosis gallstones, renal cyst, but no acute pathology   ABG: mild hypercapnia    COPD history  NOT in acute exacerbation   albuterol prn and advair    lower leg pain   Bilateral dopplers unremarkable  fall precautions   PT consulted--> [facility]    Xerosis  Skin precautions. Gentle skin care with mild soaps  encourage po intake.    DVT ppx  Eliquis     GOC: DNR/DNI    Dispo: DC pending auth. for [facility], Son will be updated           89F from NH with Hx of COPD, Former Smoker, Dementia with history of COPD, former smoker and dementia.  She was admitted with worsening confusion/AMS, Hypoxemia, COVID 19.    COVID + 11/1  no pneumonia on CT; she completed Remdesivir course and completed Decadron, she does not require supplemental O2  ID recs noted    Acute PE            - CTA shows b/l segmental PE, no RV strain            - BNP, Trop normal;            - TTE with normal  EF. no RV strain            - transitioned to eliquis night of 11/7 from Lovenox, h/h appears stable this am            - pulm cx noted. pulm f/u OP after DC  Mild hypoxia               Improved. now on RA, O2 sats 95%               Likely due to PE              ddimer 2900, CTA PE protocol : + PE              Duplex done on admission was neg for DVT    SIRS  Zosyn and vanco started empirically in ED. currently off vanco and zosyn  BC 1/2: coag neg staph. 2/2: GPC in clusters  Repeat BC no growth   ID consult noted and appreciated    Metabolic encephalopathy 2/2 covid, hypoxia, PE: improved. now at baseline  baseline verbal with confusion per Son  CT head with no acute intracranial pathology; multi-level degenerative changes in c-spine  CT abdomen pelvis: diverticulosis gallstones, renal cyst, but no acute pathology   ABG: mild hypercapnia    COPD history  NOT in acute exacerbation   albuterol prn and advair    lower leg pain   Bilateral dopplers unremarkable  fall precautions   PT consulted--> [facility]    Xerosis  Skin precautions. Gentle skin care with mild soaps  encourage po intake.    DVT ppx  Eliquis     GOC: DNR/DNI    Dispo: DC pending auth. for [facility], Son NEREYDA called today and left telephone message.           89F from NH with Hx of COPD, Former Smoker, Dementia with history of COPD, former smoker and dementia.  She was admitted with worsening confusion/AMS, Hypoxemia, COVID 19.    COVID + 11/1  no pneumonia on CT; she completed Remdesivir course and completed Decadron, she does not require supplemental O2  ID recs noted    Acute PE            - CTA shows b/l segmental PE, no RV strain            - BNP, Trop normal;            - TTE with normal  EF. no RV strain            - transitioned to eliquis night of 11/7 from Lovenox, h/h appears stable this am            - pulm cx noted. pulm f/u OP after DC  Mild hypoxia               resolved. now on RA, O2 sats 95%               Likely due to PE              ddimer 2900, CTA PE protocol : + PE              Duplex done on admission was neg for DVT    SIRS  Zosyn and vanco started empirically in ED. currently off vanco and zosyn  BC 1/2: coag neg staph. 2/2: GPC in clusters  Repeat BC no growth   ID consult noted and appreciated    Metabolic encephalopathy 2/2 covid, hypoxia, PE: improved. now at baseline  baseline verbal with confusion per Son  CT head with no acute intracranial pathology; multi-level degenerative changes in c-spine  CT abdomen pelvis: diverticulosis gallstones, renal cyst, but no acute pathology   ABG: mild hypercapnia    COPD history  NOT in acute exacerbation   albuterol prn and advair    lower leg pain   Bilateral dopplers unremarkable  fall precautions   PT consulted--> [facility]    Xerosis  Skin precautions. Gentle skin care with mild soaps  encourage po intake.    DVT ppx  Eliquis     GOC: DNR/DNI    Dispo: DC pending auth. for [facility], Son NEREYDA called today and left telephone message.

## 2024-11-10 NOTE — PROGRESS NOTE ADULT - TIME BILLING
Time spent includes direct patient care  (interview and examination of patient), discussion with other providers, support staff and/or patient's family members, review of medical records, ordering diagnostic tests and analyzing results, and documentation, excluding time spent in ACP discussions.

## 2024-11-11 ENCOUNTER — TRANSCRIPTION ENCOUNTER (OUTPATIENT)
Age: 89
End: 2024-11-11

## 2024-11-11 VITALS
SYSTOLIC BLOOD PRESSURE: 120 MMHG | RESPIRATION RATE: 18 BRPM | DIASTOLIC BLOOD PRESSURE: 77 MMHG | HEART RATE: 77 BPM | OXYGEN SATURATION: 93 % | TEMPERATURE: 98 F

## 2024-11-11 PROCEDURE — 99239 HOSP IP/OBS DSCHRG MGMT >30: CPT

## 2024-11-11 RX ORDER — APIXABAN 5 MG/1
2 TABLET, FILM COATED ORAL
Qty: 0 | Refills: 0 | DISCHARGE
Start: 2024-11-11

## 2024-11-11 RX ORDER — ACETAMINOPHEN 500 MG
2 TABLET ORAL
Qty: 0 | Refills: 0 | DISCHARGE
Start: 2024-11-11

## 2024-11-11 RX ADMIN — PETROLATUM 1 APPLICATION(S): 987.89 OINTMENT TOPICAL at 06:12

## 2024-11-11 RX ADMIN — FLUTICASONE PROPIONATE AND SALMETEROL XINAFOATE 1 DOSE(S): 230; 21 AEROSOL, METERED RESPIRATORY (INHALATION) at 10:07

## 2024-11-11 RX ADMIN — Medication 81 MILLIGRAM(S): at 12:42

## 2024-11-11 RX ADMIN — Medication 2000 UNIT(S): at 12:41

## 2024-11-11 RX ADMIN — QUETIAPINE FUMARATE 12.5 MILLIGRAM(S): 200 TABLET ORAL at 12:41

## 2024-11-11 RX ADMIN — FOLIC ACID 1 MILLIGRAM(S): 1 TABLET ORAL at 12:42

## 2024-11-11 RX ADMIN — IPRATROPIUM BROMIDE AND ALBUTEROL SULFATE 3 MILLILITER(S): .5; 2.5 SOLUTION RESPIRATORY (INHALATION) at 10:08

## 2024-11-11 RX ADMIN — Medication 100 MILLIGRAM(S): at 12:41

## 2024-11-11 RX ADMIN — IPRATROPIUM BROMIDE AND ALBUTEROL SULFATE 3 MILLILITER(S): .5; 2.5 SOLUTION RESPIRATORY (INHALATION) at 05:20

## 2024-11-11 RX ADMIN — APIXABAN 10 MILLIGRAM(S): 5 TABLET, FILM COATED ORAL at 05:58

## 2024-11-11 NOTE — PROGRESS NOTE ADULT - ASSESSMENT
89F from NH with Hx of COPD, Former Smoker, Dementia with history of COPD, former smoker and dementia.  She was admitted with worsening confusion/AMS, Hypoxemia, COVID 19.    COVID + 11/1  Leukocytosis noted, patient asymptomatic, afebrile    no pneumonia on CT; she completed Remdesivir course and completed Decadron, she does not require supplemental O2  ID recs noted    Acute PE            - CTA shows b/l segmental PE, no RV strain            - BNP, Trop normal;            - TTE with normal  EF. no RV strain            - transitioned to eliquis night of 11/7 from Lovenox, h/h appears stable            - pulm cx noted. pulm f/u OP after DC  Mild hypoxia               resolved. now on RA, O2 sats 95%               Likely due to PE              ddimer 2900, CTA PE protocol : + PE              Duplex done on admission was neg for DVT    SIRS  Zosyn and vanco started empirically in ED. currently off vanco and zosyn  BC 1/2: coag neg staph. 2/2: GPC in clusters  Repeat BC no growth   ID consult noted and appreciated    Metabolic encephalopathy 2/2 covid, hypoxia, PE: improved. now at baseline  baseline verbal with confusion per Son  CT head with no acute intracranial pathology; multi-level degenerative changes in c-spine  CT abdomen pelvis: diverticulosis gallstones, renal cyst, but no acute pathology   ABG: mild hypercapnia    COPD history  NOT in acute exacerbation   albuterol prn and advair    lower leg pain   Bilateral dopplers unremarkable  fall precautions   PT consulted--> [facility]    Xerosis  Skin precautions. Gentle skin care with mild soaps  encourage po intake.    DVT ppx  Eliquis     GOC: DNR/DNI    Dispo: DC pending auth. for [facility], Son NEREYDA called            89F from NH with Hx of COPD, Former Smoker, Dementia with history of COPD, former smoker and dementia.  She was admitted with worsening confusion/AMS, Hypoxemia, COVID 19.    COVID + 11/1  Leukocytosis noted after steroids, patient asymptomatic, afebrile - monitor   no pneumonia on CT; she completed Remdesivir course and completed Decadron, she does not require supplemental O2  ID recs noted    Acute PE            - CTA shows b/l segmental PE, no RV strain            - BNP, Trop normal;            - TTE with normal  EF. no RV strain            - transitioned to eliquis night of 11/7 from Lovenox, h/h appears stable            - pulm cx noted. pulm f/u OP after DC  Mild hypoxia               resolved. now on RA, O2 sats 95%               Likely due to PE              ddimer 2900, CTA PE protocol : + PE              Duplex done on admission was neg for DVT    SIRS  Zosyn and vanco started empirically in ED. currently off vanco and zosyn  BC 1/2: coag neg staph. 2/2: GPC in clusters  Repeat BC no growth   ID consult noted and appreciated    Metabolic encephalopathy 2/2 covid, hypoxia, PE: improved. now at baseline  baseline verbal with confusion per Son  CT head with no acute intracranial pathology; multi-level degenerative changes in c-spine  CT abdomen pelvis: diverticulosis gallstones, renal cyst, but no acute pathology   ABG: mild hypercapnia    COPD history  NOT in acute exacerbation   albuterol prn and advair    lower leg pain   Bilateral dopplers unremarkable  fall precautions   PT consulted--> [facility]    Xerosis  Skin precautions. Gentle skin care with mild soaps  encourage po intake.    DVT ppx  Eliquis     GOC: DNR/DNI    Dispo: DC pending auth. for [facility], Son NEREYDA called

## 2024-11-11 NOTE — DISCHARGE NOTE NURSING/CASE MANAGEMENT/SOCIAL WORK - FINANCIAL ASSISTANCE
[facility] provides services at a reduced cost to those who are determined to be eligible through [facility]’s financial assistance program. Information regarding [facility]’s financial assistance program can be found by going to [email or web address] or by calling [phone].

## 2024-11-11 NOTE — DISCHARGE NOTE NURSING/CASE MANAGEMENT/SOCIAL WORK - PATIENT PORTAL LINK FT
You can access the FollowMyHealth Patient Portal offered by [facility] by registering at the following website: [email or web address]. By joining [employer]’s FollowMyHealth portal, you will also be able to view your health information using other applications (apps) compatible with our system.

## 2024-11-11 NOTE — PROGRESS NOTE ADULT - NS ATTEND AMEND GEN_ALL_CORE FT
pt stable for d/c to [facility], pending auth  updated son Estevan 11/11
reviewed CBC, Hgb stable. leukocytosis noted, pt afebrile. stable for d/c to [facility], awaiting placement with auth
will change AC to PO Eliquis for tonight and f/u CBC in AM to monitor Hgb. Pending d/c to [facility]
reviewed AM labs; Hgb stable on Eliquis PO. Mild leukocytosis 2/2 steroids - will d/c decadron as pt w/o need for supplemental O2. plan for [facility]
pt with mild leukocytosis, likely from steroids. no fever. tolerating PO Eliquis for PE. stable for d/c pending auth for [facility]

## 2024-11-11 NOTE — PROGRESS NOTE ADULT - PROVIDER SPECIALTY LIST ADULT
Hospitalist
Pulmonology
Infectious Disease
Infectious Disease
Internal Medicine
Hospitalist

## 2024-11-11 NOTE — DISCHARGE NOTE NURSING/CASE MANAGEMENT/SOCIAL WORK - NSDCVIVACCINE_GEN_ALL_CORE_FT
Tdap; 08-Sep-2024 13:15; Maria A Young (RN); [employer]; [id number] (Exp. Date: 28-Feb-2026); IntraMuscular; Deltoid Left.; 0.5 milliLiter(s); VIS (VIS Published: 09-May-2013, VIS Presented: 08-Sep-2024);

## 2024-11-11 NOTE — PROGRESS NOTE ADULT - REASON FOR ADMISSION
COVID PNA

## 2024-11-11 NOTE — PROGRESS NOTE ADULT - SUBJECTIVE AND OBJECTIVE BOX
Patient is a 89y old Female who presents with a chief complaint of COVID PNA (10 Nov 2024 07:37)      Patient seen and examined at bedside. No overnight events reported. Patient states she is feeling well. Denies chest pain, sob, nausea, vomiting.     ALLERGIES:  No Known Allergies    MEDICATIONS  (STANDING):  albuterol/ipratropium for Nebulization 3 milliLiter(s) Nebulizer every 6 hours  apixaban 10 milliGRAM(s) Oral every 12 hours  aspirin  chewable 81 milliGRAM(s) Oral daily  cholecalciferol 2000 Unit(s) Oral daily  fluticasone propionate/ salmeterol 100-50 MICROgram(s) Diskus 1 Dose(s) Inhalation two times a day  folic acid 1 milliGRAM(s) Oral daily  melatonin 3 milliGRAM(s) Oral at bedtime  petrolatum white Ointment 1 Application(s) Topical two times a day  QUEtiapine 12.5 milliGRAM(s) Oral daily  thiamine 100 milliGRAM(s) Oral daily    MEDICATIONS  (PRN):  acetaminophen     Tablet .. 650 milliGRAM(s) Oral every 6 hours PRN Temp greater or equal to 38C (100.4F), Mild Pain (1 - 3)  aluminum hydroxide/magnesium hydroxide/simethicone Suspension 30 milliLiter(s) Oral every 4 hours PRN Dyspepsia  ondansetron Injectable 4 milliGRAM(s) IV Push every 8 hours PRN Nausea and/or Vomiting    Vital Signs Last 24 Hrs  T(F): 98 (11 Nov 2024 05:38), Max: 98 (10 Nov 2024 13:10)  HR: 77 (11 Nov 2024 05:38) (77 - 109)  BP: 120/77 (11 Nov 2024 05:38) (95/59 - 120/77)  RR: 18 (11 Nov 2024 05:38) (15 - 18)  SpO2: 93% (11 Nov 2024 05:38) (93% - 98%)  I&O's Summary    PHYSICAL EXAM:  General: NAD, Awake, alert  ENT: [place], Moist mucous membranes, no thrush  Neck: Supple, No JVD  Lungs: Clear to auscultation bilaterally, good air entry, non-labored breathing  Cardio: RRR, S1/S2, No murmur  Abdomen: Soft, Nontender, Nondistended; Bowel sounds present  Extremities: No calf tenderness, No cyanosis, No pitting edema  Psych: Appropriate mood and affect    LABS:                        13.5   12.36 )-----------( 347      ( 10 Nov 2024 07:50 )             42.5     11-09    138  |  103  |  19  ----------------------------<  89  3.8   |  28  |  0.76    Ca    8.7      09 Nov 2024 07:08    TPro  6.3  /  Alb  2.8  /  TBili  0.6  /  DBili  0.1  /  AST  23  /  ALT  20  /  AlkPhos  91  11-09          PT/INR - ( 09 Nov 2024 07:08 )   PT: 18.2 sec;   INR: 1.55 ratio                                     Urinalysis Basic - ( 09 Nov 2024 07:08 )    Color: x / Appearance: x / SG: x / pH: x  Gluc: 89 mg/dL / Ketone: x  / Bili: x / Urobili: x   Blood: x / Protein: x / Nitrite: x   Leuk Esterase: x / RBC: x / WBC x   Sq Epi: x / Non Sq Epi: x / Bacteria: x        Culture - Blood (collected 04 Nov 2024 10:11)  Source: .Blood BLOOD  Final Report (09 Nov 2024 14:00):    No growth at 5 days    Culture - Blood (collected 04 Nov 2024 10:04)  Source: .Blood BLOOD  Final Report (09 Nov 2024 14:00):    No growth at 5 days        RADIOLOGY & ADDITIONAL TESTS:    Care Discussed with Consultants/Other Providers:

## 2024-11-11 NOTE — PROGRESS NOTE ADULT - NUTRITIONAL ASSESSMENT
This patient has been assessed with a concern for Malnutrition and has been determined to have a diagnosis/diagnoses of Severe protein-calorie malnutrition.    This patient is being managed with:   Diet Regular-  Entered: Nov 1 2024  5:48PM  
This patient has been assessed with a concern for Malnutrition and has been determined to have a diagnosis/diagnoses of Severe protein-calorie malnutrition.    This patient is being managed with:   Diet Regular-  Entered: Nov 1 2024  5:48PM  
This patient has been assessed with a concern for Malnutrition and has been determined to have a diagnosis/diagnoses of Severe protein-calorie malnutrition.    This patient is being managed with:   Diet Regular-  Entered: Nov 1 2024  5:48PM    The following pending diet order is being considered for treatment of Severe protein-calorie malnutrition:  Diet Regular-  Supplement Feeding Modality:  Oral  Ensure Plus High Protein Cans or Servings Per Day:  1       Frequency:  Two Times a day  Entered: Nov 8 2024 11:47AM  
This patient has been assessed with a concern for Malnutrition and has been determined to have a diagnosis/diagnoses of Severe protein-calorie malnutrition.    This patient is being managed with:   Diet Regular-  Entered: Nov 1 2024  5:48PM  
This patient has been assessed with a concern for Malnutrition and has been determined to have a diagnosis/diagnoses of Severe protein-calorie malnutrition.    This patient is being managed with:   Diet Regular-  Entered: Nov 1 2024  5:48PM    The following pending diet order is being considered for treatment of Severe protein-calorie malnutrition:  Diet Regular-  Supplement Feeding Modality:  Oral  Ensure Plus High Protein Cans or Servings Per Day:  1       Frequency:  Two Times a day  Entered: Nov 8 2024 11:47AM  
This patient has been assessed with a concern for Malnutrition and has been determined to have a diagnosis/diagnoses of Severe protein-calorie malnutrition.    This patient is being managed with:   Diet Regular-  Entered: Nov 1 2024  5:48PM  
This patient has been assessed with a concern for Malnutrition and has been determined to have a diagnosis/diagnoses of Severe protein-calorie malnutrition.    This patient is being managed with:   Diet Regular-  Entered: Nov 1 2024  5:48PM  
This patient has been assessed with a concern for Malnutrition and has been determined to have a diagnosis/diagnoses of Severe protein-calorie malnutrition.    This patient is being managed with:   Diet Regular-  Entered: Nov 1 2024  5:48PM    The following pending diet order is being considered for treatment of Severe protein-calorie malnutrition:  Diet Regular-  Supplement Feeding Modality:  Oral  Ensure Plus High Protein Cans or Servings Per Day:  1       Frequency:  Two Times a day  Entered: Nov 8 2024 11:47AM

## 2024-11-18 NOTE — DISCHARGE NOTE PROVIDER - NSDCCAREPROVSEEN_GEN_ALL_CORE_FT
Detail Level: Detailed Quality 47: Advance Care Plan: Advance Care Planning discussed and documented; advance care plan or surrogate decision maker documented in the medical record. Quality 226: Preventive Care And Screening: Tobacco Use: Screening And Cessation Intervention: Patient screened for tobacco use and is an ex/non-smoker Jessie Boateng

## 2024-11-26 PROCEDURE — 84484 ASSAY OF TROPONIN QUANT: CPT

## 2024-11-26 PROCEDURE — 80076 HEPATIC FUNCTION PANEL: CPT

## 2024-11-26 PROCEDURE — 83615 LACTATE (LD) (LDH) ENZYME: CPT

## 2024-11-26 PROCEDURE — 87150 DNA/RNA AMPLIFIED PROBE: CPT

## 2024-11-26 PROCEDURE — 87899 AGENT NOS ASSAY W/OPTIC: CPT

## 2024-11-26 PROCEDURE — 71045 X-RAY EXAM CHEST 1 VIEW: CPT

## 2024-11-26 PROCEDURE — 96374 THER/PROPH/DIAG INJ IV PUSH: CPT

## 2024-11-26 PROCEDURE — 83880 ASSAY OF NATRIURETIC PEPTIDE: CPT

## 2024-11-26 PROCEDURE — 96375 TX/PRO/DX INJ NEW DRUG ADDON: CPT

## 2024-11-26 PROCEDURE — 83036 HEMOGLOBIN GLYCOSYLATED A1C: CPT

## 2024-11-26 PROCEDURE — 93005 ELECTROCARDIOGRAM TRACING: CPT

## 2024-11-26 PROCEDURE — 80048 BASIC METABOLIC PNL TOTAL CA: CPT

## 2024-11-26 PROCEDURE — 83735 ASSAY OF MAGNESIUM: CPT

## 2024-11-26 PROCEDURE — 87040 BLOOD CULTURE FOR BACTERIA: CPT

## 2024-11-26 PROCEDURE — 87186 SC STD MICRODIL/AGAR DIL: CPT

## 2024-11-26 PROCEDURE — 85730 THROMBOPLASTIN TIME PARTIAL: CPT

## 2024-11-26 PROCEDURE — 87637 SARSCOV2&INF A&B&RSV AMP PRB: CPT

## 2024-11-26 PROCEDURE — 71275 CT ANGIOGRAPHY CHEST: CPT | Mod: MC

## 2024-11-26 PROCEDURE — 36415 COLL VENOUS BLD VENIPUNCTURE: CPT

## 2024-11-26 PROCEDURE — 93970 EXTREMITY STUDY: CPT

## 2024-11-26 PROCEDURE — 82803 BLOOD GASES ANY COMBINATION: CPT

## 2024-11-26 PROCEDURE — 71250 CT THORAX DX C-: CPT | Mod: MC

## 2024-11-26 PROCEDURE — 84145 PROCALCITONIN (PCT): CPT

## 2024-11-26 PROCEDURE — 72125 CT NECK SPINE W/O DYE: CPT | Mod: MC

## 2024-11-26 PROCEDURE — 97162 PT EVAL MOD COMPLEX 30 MIN: CPT

## 2024-11-26 PROCEDURE — 85610 PROTHROMBIN TIME: CPT

## 2024-11-26 PROCEDURE — 85025 COMPLETE CBC W/AUTO DIFF WBC: CPT

## 2024-11-26 PROCEDURE — 94640 AIRWAY INHALATION TREATMENT: CPT

## 2024-11-26 PROCEDURE — 99285 EMERGENCY DEPT VISIT HI MDM: CPT | Mod: 25

## 2024-11-26 PROCEDURE — 86140 C-REACTIVE PROTEIN: CPT

## 2024-11-26 PROCEDURE — 87449 NOS EACH ORGANISM AG IA: CPT

## 2024-11-26 PROCEDURE — 94760 N-INVAS EAR/PLS OXIMETRY 1: CPT

## 2024-11-26 PROCEDURE — 87640 STAPH A DNA AMP PROBE: CPT

## 2024-11-26 PROCEDURE — 87641 MR-STAPH DNA AMP PROBE: CPT

## 2024-11-26 PROCEDURE — 82728 ASSAY OF FERRITIN: CPT

## 2024-11-26 PROCEDURE — 93306 TTE W/DOPPLER COMPLETE: CPT

## 2024-11-26 PROCEDURE — 97116 GAIT TRAINING THERAPY: CPT

## 2024-11-26 PROCEDURE — 70450 CT HEAD/BRAIN W/O DYE: CPT | Mod: MC

## 2024-11-26 PROCEDURE — 82565 ASSAY OF CREATININE: CPT

## 2024-11-26 PROCEDURE — 81001 URINALYSIS AUTO W/SCOPE: CPT

## 2024-11-26 PROCEDURE — 85379 FIBRIN DEGRADATION QUANT: CPT

## 2024-11-26 PROCEDURE — 85027 COMPLETE CBC AUTOMATED: CPT

## 2024-11-26 PROCEDURE — 87086 URINE CULTURE/COLONY COUNT: CPT

## 2024-11-26 PROCEDURE — 74176 CT ABD & PELVIS W/O CONTRAST: CPT | Mod: MC

## 2024-11-26 PROCEDURE — 80053 COMPREHEN METABOLIC PANEL: CPT

## 2024-11-26 PROCEDURE — 83605 ASSAY OF LACTIC ACID: CPT

## 2024-11-26 PROCEDURE — 87077 CULTURE AEROBIC IDENTIFY: CPT

## 2025-01-22 ENCOUNTER — EMERGENCY (EMERGENCY)
Facility: HOSPITAL | Age: 89
LOS: 1 days | Discharge: HOME | End: 2025-01-22
Attending: STUDENT IN AN ORGANIZED HEALTH CARE EDUCATION/TRAINING PROGRAM | Admitting: STUDENT IN AN ORGANIZED HEALTH CARE EDUCATION/TRAINING PROGRAM
Payer: MEDICARE

## 2025-01-22 VITALS
HEART RATE: 68 BPM | SYSTOLIC BLOOD PRESSURE: 158 MMHG | OXYGEN SATURATION: 94 % | DIASTOLIC BLOOD PRESSURE: 92 MMHG | RESPIRATION RATE: 18 BRPM

## 2025-01-22 VITALS
HEIGHT: 63 IN | OXYGEN SATURATION: 96 % | DIASTOLIC BLOOD PRESSURE: 82 MMHG | TEMPERATURE: 98 F | RESPIRATION RATE: 18 BRPM | HEART RATE: 72 BPM | WEIGHT: 166.89 LBS | SYSTOLIC BLOOD PRESSURE: 123 MMHG

## 2025-01-22 PROCEDURE — 71045 X-RAY EXAM CHEST 1 VIEW: CPT | Mod: 26

## 2025-01-22 PROCEDURE — 99284 EMERGENCY DEPT VISIT MOD MDM: CPT | Mod: 25

## 2025-01-22 PROCEDURE — 99285 EMERGENCY DEPT VISIT HI MDM: CPT

## 2025-01-22 PROCEDURE — 70450 CT HEAD/BRAIN W/O DYE: CPT | Mod: MC

## 2025-01-22 PROCEDURE — 72170 X-RAY EXAM OF PELVIS: CPT

## 2025-01-22 PROCEDURE — 72125 CT NECK SPINE W/O DYE: CPT | Mod: MC

## 2025-01-22 PROCEDURE — 71045 X-RAY EXAM CHEST 1 VIEW: CPT

## 2025-01-22 PROCEDURE — 72170 X-RAY EXAM OF PELVIS: CPT | Mod: 26

## 2025-01-22 PROCEDURE — 70486 CT MAXILLOFACIAL W/O DYE: CPT | Mod: MC

## 2025-01-22 PROCEDURE — 70450 CT HEAD/BRAIN W/O DYE: CPT | Mod: 26

## 2025-01-22 PROCEDURE — 70486 CT MAXILLOFACIAL W/O DYE: CPT | Mod: 26

## 2025-01-22 PROCEDURE — 72125 CT NECK SPINE W/O DYE: CPT | Mod: 26

## 2025-01-22 NOTE — ED ADULT NURSE NOTE - NSFALLHARMRISKINTERV_ED_ALL_ED

## 2025-01-22 NOTE — ED PROVIDER NOTE - PHYSICAL EXAMINATION
VITAL SIGNS: I have reviewed nursing notes and confirm.  CONSTITUTIONAL: well-appearing, non-toxic, NAD  SKIN: Warm dry, normal skin turgor  HEAD: NC forehead contusion, nasal bridge   CARD: RRR, no murmurs, rubs or gallops  RESP: clear to ausculation b/l.  No rales, rhonchi, or wheezing.  ABD: soft, + BS, non-tender, non-distended, no rebound or guarding  EXT: Full ROM, no bony tenderness,  NEURO: normal motor. normal sensory.   PSYCH: Cooperative, appropriate.

## 2025-01-22 NOTE — ED ADULT TRIAGE NOTE - RESPIRATORY RATE (BREATHS/MIN)
1 ML 1.5 percent Lidocaine with Epinephrine 1:200,000 3 ML 1.5 percent Lidocaine with Epinephrine 1:200,000 18

## 2025-01-22 NOTE — ED PROVIDER NOTE - PATIENT PORTAL LINK FT
You can access the FollowMyHealth Patient Portal offered by [facility] by registering at the following website: [email or web address]. By joining [facility]’s FollowMyHealth portal, you will also be able to view your health information using other applications (apps) compatible with our system.

## 2025-01-22 NOTE — ED ADULT NURSE NOTE - CHIEF COMPLAINT QUOTE
Patient brought in by EMS from [facility] with complaint of unwitnessed fall out of wheelchair. Patient noted to have abrasion to face and wound to L arm. Per EMS, patient denies any LOC. Patient takes asprin. Patient placed on O2 4L via nasal cannula, per EMS O2 was 88%. PMH of COPD

## 2025-01-22 NOTE — ED PROVIDER NOTE - CLINICAL SUMMARY MEDICAL DECISION MAKING FREE TEXT BOX
89-year-old female with history of COPD, dementia presenting to the ED from [facility] status post unwitnessed fall back patient on Eliquis, with reported head injury status post fall off wheelchair in front of her head, patient poor historian, with noted facial abrasion and nasal contusion on bridge of nose, no other signs of external trauma, full range of motion of extremities.  Abdomen soft nontender nondistended, no chest wall tenderness, will obtain screening chest x-ray pelvic x-ray CT head maxillofacial cervical spine x-ray to rule out traumatic injuries follow-up imaging and dispo accordingly.

## 2025-01-22 NOTE — ED ADULT NURSE NOTE - NS ED NURSE LEVEL OF CONSCIOUSNESS AFFECT
Edema localized on the left leg and ankle since he has had an episode of not moving around with the back pain was on a wheelchair was hospitalized a couple of weeks ago his at risk for DVT will check a venous duplex stat    Will also add a D-dimer I told him to go for his electrolytes
Has vesicles started clearing up on the left side of the neck  Not a spread to the face or eye  Has some discomfort  He has some lesions on the right arm which I think more like bug bites  I do not think is the shingles unusual to have a bilaterally is finishing up his acyclovir which is 400 mg 2 tablets 5 times a day will see him back in 2 weeks for follow-up    Will order triamcinolone ointment to the right arm 
Have him go for his basic metabolic panel today 
Calm

## 2025-01-22 NOTE — ED ADULT NURSE NOTE - BIRTH SEX
4/12/2022     Ar Spencer   [address]  [place] [id number]        NOTIFICACIÓN SOBRE RESULTADO DE ESTUDIOS MÉDICOS    Estimado/a Ar,     No hemos podido comunicarnos telefónicamente con usted con relación a los estudios médicos recientes que se realizó en la clínica.       Comuníquese con la clínica a la brevedad posible a fin de concertar vera shannon con el <doctor> para analizar los resultados y el plan de atención médica necesaria.    Jorge.          Jeremias Arredondo MD  [facility], [facility] [address]  [address] [address] [place] [address]  [place] [id number]  Dept Phone: [phone]       Female

## 2025-01-22 NOTE — ED ADULT NURSE NOTE - OBJECTIVE STATEMENT
Patient presenting to the ED from [facility] status post unwitnessed fall back patient on Eliquis. Awake and follows simple commands. No nausea, vomiting, dizziness or SOB.

## 2025-01-22 NOTE — ED ADULT TRIAGE NOTE - CHIEF COMPLAINT QUOTE
Patient brought in by EMS from [facility] with complaint of unwitnessed fall out of wheelchair. Patient noted to have abrasion to face and wound to L arm. Per EMS, patient denies any LOC. Patient takes asprin. Patient placed on O2 4L via nasal cannula, per EMS O2 was 88%. PMH of COPD

## 2025-03-01 ENCOUNTER — INPATIENT (INPATIENT)
Facility: HOSPITAL | Age: 89
LOS: 3 days | Discharge: NURSING, REHAB OR LONG-TERM CARE | DRG: 689 | End: 2025-03-05
Attending: STUDENT IN AN ORGANIZED HEALTH CARE EDUCATION/TRAINING PROGRAM | Admitting: INTERNAL MEDICINE
Payer: MEDICARE

## 2025-03-01 VITALS
HEART RATE: 63 BPM | TEMPERATURE: 97 F | HEIGHT: 63 IN | WEIGHT: 166.89 LBS | DIASTOLIC BLOOD PRESSURE: 69 MMHG | OXYGEN SATURATION: 99 % | RESPIRATION RATE: 20 BRPM | SYSTOLIC BLOOD PRESSURE: 101 MMHG

## 2025-03-01 DIAGNOSIS — N39.0 URINARY TRACT INFECTION, SITE NOT SPECIFIED: ICD-10-CM

## 2025-03-01 LAB
ALBUMIN SERPL ELPH-MCNC: 2.6 G/DL — LOW (ref 3.3–5)
ALBUMIN SERPL ELPH-MCNC: 2.8 G/DL — LOW (ref 3.3–5)
ALP SERPL-CCNC: 102 U/L — SIGNIFICANT CHANGE UP (ref 40–120)
ALP SERPL-CCNC: 99 U/L — SIGNIFICANT CHANGE UP (ref 40–120)
ALT FLD-CCNC: 16 U/L — SIGNIFICANT CHANGE UP (ref 10–45)
ALT FLD-CCNC: 17 U/L — SIGNIFICANT CHANGE UP (ref 10–45)
ANION GAP SERPL CALC-SCNC: 6 MMOL/L — SIGNIFICANT CHANGE UP (ref 5–17)
ANION GAP SERPL CALC-SCNC: 7 MMOL/L — SIGNIFICANT CHANGE UP (ref 5–17)
APPEARANCE UR: ABNORMAL
AST SERPL-CCNC: 23 U/L — SIGNIFICANT CHANGE UP (ref 10–40)
AST SERPL-CCNC: 25 U/L — SIGNIFICANT CHANGE UP (ref 10–40)
BACTERIA # UR AUTO: ABNORMAL /HPF
BASE EXCESS BLDV CALC-SCNC: 4.6 MMOL/L — HIGH (ref -2–3)
BASOPHILS # BLD AUTO: 0.08 K/UL — SIGNIFICANT CHANGE UP (ref 0–0.2)
BASOPHILS # BLD AUTO: 0.12 K/UL — SIGNIFICANT CHANGE UP (ref 0–0.2)
BASOPHILS NFR BLD AUTO: 0.8 % — SIGNIFICANT CHANGE UP (ref 0–2)
BASOPHILS NFR BLD AUTO: 0.9 % — SIGNIFICANT CHANGE UP (ref 0–2)
BILIRUB SERPL-MCNC: 0.3 MG/DL — SIGNIFICANT CHANGE UP (ref 0.2–1.2)
BILIRUB SERPL-MCNC: 0.4 MG/DL — SIGNIFICANT CHANGE UP (ref 0.2–1.2)
BILIRUB UR-MCNC: ABNORMAL
BLOOD GAS COMMENTS, VENOUS: SIGNIFICANT CHANGE UP
BUN SERPL-MCNC: 18 MG/DL — SIGNIFICANT CHANGE UP (ref 7–23)
BUN SERPL-MCNC: 22 MG/DL — SIGNIFICANT CHANGE UP (ref 7–23)
CALCIUM SERPL-MCNC: 8.4 MG/DL — SIGNIFICANT CHANGE UP (ref 8.4–10.5)
CALCIUM SERPL-MCNC: 8.9 MG/DL — SIGNIFICANT CHANGE UP (ref 8.4–10.5)
CHLORIDE SERPL-SCNC: 105 MMOL/L — SIGNIFICANT CHANGE UP (ref 96–108)
CHLORIDE SERPL-SCNC: 108 MMOL/L — SIGNIFICANT CHANGE UP (ref 96–108)
CK SERPL-CCNC: 231 U/L — HIGH (ref 25–170)
CO2 BLDV-SCNC: 31 MMOL/L — HIGH (ref 22–26)
CO2 SERPL-SCNC: 27 MMOL/L — SIGNIFICANT CHANGE UP (ref 22–31)
CO2 SERPL-SCNC: 28 MMOL/L — SIGNIFICANT CHANGE UP (ref 22–31)
COLOR SPEC: SIGNIFICANT CHANGE UP
CREAT SERPL-MCNC: 0.61 MG/DL — SIGNIFICANT CHANGE UP (ref 0.5–1.3)
CREAT SERPL-MCNC: 0.72 MG/DL — SIGNIFICANT CHANGE UP (ref 0.5–1.3)
DIFF PNL FLD: ABNORMAL
EGFR: 80 ML/MIN/1.73M2 — SIGNIFICANT CHANGE UP
EGFR: 80 ML/MIN/1.73M2 — SIGNIFICANT CHANGE UP
EGFR: 85 ML/MIN/1.73M2 — SIGNIFICANT CHANGE UP
EGFR: 85 ML/MIN/1.73M2 — SIGNIFICANT CHANGE UP
EOSINOPHIL # BLD AUTO: 0.36 K/UL — SIGNIFICANT CHANGE UP (ref 0–0.5)
EOSINOPHIL # BLD AUTO: 0.36 K/UL — SIGNIFICANT CHANGE UP (ref 0–0.5)
EOSINOPHIL NFR BLD AUTO: 2.6 % — SIGNIFICANT CHANGE UP (ref 0–6)
EOSINOPHIL NFR BLD AUTO: 3.6 % — SIGNIFICANT CHANGE UP (ref 0–6)
EPI CELLS # UR: PRESENT
FLUAV AG NPH QL: SIGNIFICANT CHANGE UP
FLUBV AG NPH QL: SIGNIFICANT CHANGE UP
GAS PNL BLDV: SIGNIFICANT CHANGE UP
GLUCOSE SERPL-MCNC: 118 MG/DL — HIGH (ref 70–99)
GLUCOSE SERPL-MCNC: 94 MG/DL — SIGNIFICANT CHANGE UP (ref 70–99)
GLUCOSE UR QL: NEGATIVE MG/DL — SIGNIFICANT CHANGE UP
HCO3 BLDV-SCNC: 29 MMOL/L — SIGNIFICANT CHANGE UP (ref 22–29)
HCT VFR BLD CALC: 41.2 % — SIGNIFICANT CHANGE UP (ref 34.5–45)
HCT VFR BLD CALC: 42.7 % — SIGNIFICANT CHANGE UP (ref 34.5–45)
HGB BLD-MCNC: 13.4 G/DL — SIGNIFICANT CHANGE UP (ref 11.5–15.5)
HGB BLD-MCNC: 13.9 G/DL — SIGNIFICANT CHANGE UP (ref 11.5–15.5)
IMM GRANULOCYTES NFR BLD AUTO: 0.6 % — SIGNIFICANT CHANGE UP (ref 0–0.9)
IMM GRANULOCYTES NFR BLD AUTO: 0.7 % — SIGNIFICANT CHANGE UP (ref 0–0.9)
KETONES UR-MCNC: NEGATIVE MG/DL — SIGNIFICANT CHANGE UP
LEUKOCYTE ESTERASE UR-ACNC: ABNORMAL
LYMPHOCYTES # BLD AUTO: 1.07 K/UL — SIGNIFICANT CHANGE UP (ref 1–3.3)
LYMPHOCYTES # BLD AUTO: 1.43 K/UL — SIGNIFICANT CHANGE UP (ref 1–3.3)
LYMPHOCYTES # BLD AUTO: 10.3 % — LOW (ref 13–44)
LYMPHOCYTES # BLD AUTO: 10.6 % — LOW (ref 13–44)
MAGNESIUM SERPL-MCNC: 1.9 MG/DL — SIGNIFICANT CHANGE UP (ref 1.6–2.6)
MAGNESIUM SERPL-MCNC: 2 MG/DL — SIGNIFICANT CHANGE UP (ref 1.6–2.6)
MCHC RBC-ENTMCNC: 30.9 PG — SIGNIFICANT CHANGE UP (ref 27–34)
MCHC RBC-ENTMCNC: 31.4 PG — SIGNIFICANT CHANGE UP (ref 27–34)
MCHC RBC-ENTMCNC: 32.5 G/DL — SIGNIFICANT CHANGE UP (ref 32–36)
MCHC RBC-ENTMCNC: 32.6 G/DL — SIGNIFICANT CHANGE UP (ref 32–36)
MCV RBC AUTO: 94.9 FL — SIGNIFICANT CHANGE UP (ref 80–100)
MCV RBC AUTO: 96.5 FL — SIGNIFICANT CHANGE UP (ref 80–100)
MONOCYTES # BLD AUTO: 0.89 K/UL — SIGNIFICANT CHANGE UP (ref 0–0.9)
MONOCYTES # BLD AUTO: 1.03 K/UL — HIGH (ref 0–0.9)
MONOCYTES NFR BLD AUTO: 7.4 % — SIGNIFICANT CHANGE UP (ref 2–14)
MONOCYTES NFR BLD AUTO: 8.8 % — SIGNIFICANT CHANGE UP (ref 2–14)
NEUTROPHILS # BLD AUTO: 10.81 K/UL — HIGH (ref 1.8–7.4)
NEUTROPHILS # BLD AUTO: 7.64 K/UL — HIGH (ref 1.8–7.4)
NEUTROPHILS NFR BLD AUTO: 75.6 % — SIGNIFICANT CHANGE UP (ref 43–77)
NEUTROPHILS NFR BLD AUTO: 78.1 % — HIGH (ref 43–77)
NITRITE UR-MCNC: POSITIVE
NRBC BLD AUTO-RTO: 0 /100 WBCS — SIGNIFICANT CHANGE UP (ref 0–0)
NRBC BLD AUTO-RTO: 0 /100 WBCS — SIGNIFICANT CHANGE UP (ref 0–0)
PCO2 BLDV: 46 MMHG — HIGH (ref 39–42)
PH BLDV: 7.41 — SIGNIFICANT CHANGE UP (ref 7.32–7.43)
PH UR: 8 — SIGNIFICANT CHANGE UP (ref 5–8)
PLATELET # BLD AUTO: 407 K/UL — HIGH (ref 150–400)
PLATELET # BLD AUTO: 417 K/UL — HIGH (ref 150–400)
PO2 BLDV: <35 MMHG — SIGNIFICANT CHANGE UP (ref 25–45)
POTASSIUM SERPL-MCNC: 3.5 MMOL/L — SIGNIFICANT CHANGE UP (ref 3.5–5.3)
POTASSIUM SERPL-MCNC: 3.8 MMOL/L — SIGNIFICANT CHANGE UP (ref 3.5–5.3)
POTASSIUM SERPL-SCNC: 3.5 MMOL/L — SIGNIFICANT CHANGE UP (ref 3.5–5.3)
POTASSIUM SERPL-SCNC: 3.8 MMOL/L — SIGNIFICANT CHANGE UP (ref 3.5–5.3)
PROT SERPL-MCNC: 6.4 G/DL — SIGNIFICANT CHANGE UP (ref 6–8.3)
PROT SERPL-MCNC: 6.7 G/DL — SIGNIFICANT CHANGE UP (ref 6–8.3)
PROT UR-MCNC: 100 MG/DL
RBC # BLD: 4.27 M/UL — SIGNIFICANT CHANGE UP (ref 3.8–5.2)
RBC # BLD: 4.5 M/UL — SIGNIFICANT CHANGE UP (ref 3.8–5.2)
RBC # FLD: 13.4 % — SIGNIFICANT CHANGE UP (ref 10.3–14.5)
RBC # FLD: 13.5 % — SIGNIFICANT CHANGE UP (ref 10.3–14.5)
RBC CASTS # UR COMP ASSIST: >50 /HPF — HIGH (ref 0–4)
RSV RNA NPH QL NAA+NON-PROBE: SIGNIFICANT CHANGE UP
SAO2 % BLDV: 44.2 % — LOW (ref 67–88)
SARS-COV-2 RNA SPEC QL NAA+PROBE: SIGNIFICANT CHANGE UP
SODIUM SERPL-SCNC: 140 MMOL/L — SIGNIFICANT CHANGE UP (ref 135–145)
SODIUM SERPL-SCNC: 141 MMOL/L — SIGNIFICANT CHANGE UP (ref 135–145)
SP GR SPEC: 1.04 — HIGH (ref 1–1.03)
TRI-PHOS CRY UR QL COMP ASSIST: PRESENT
TROPONIN I, HIGH SENSITIVITY RESULT: 14.7 NG/L — SIGNIFICANT CHANGE UP
UROBILINOGEN FLD QL: 4 MG/DL (ref 0.2–1)
WBC # BLD: 10.1 K/UL — SIGNIFICANT CHANGE UP (ref 3.8–10.5)
WBC # BLD: 13.85 K/UL — HIGH (ref 3.8–10.5)
WBC # FLD AUTO: 10.1 K/UL — SIGNIFICANT CHANGE UP (ref 3.8–10.5)
WBC # FLD AUTO: 13.85 K/UL — HIGH (ref 3.8–10.5)
WBC UR QL: >50 /HPF — HIGH (ref 0–5)

## 2025-03-01 PROCEDURE — 70450 CT HEAD/BRAIN W/O DYE: CPT | Mod: 26

## 2025-03-01 PROCEDURE — 71045 X-RAY EXAM CHEST 1 VIEW: CPT | Mod: 26

## 2025-03-01 PROCEDURE — 93010 ELECTROCARDIOGRAM REPORT: CPT

## 2025-03-01 PROCEDURE — 74177 CT ABD & PELVIS W/CONTRAST: CPT | Mod: 26

## 2025-03-01 PROCEDURE — 99285 EMERGENCY DEPT VISIT HI MDM: CPT

## 2025-03-01 PROCEDURE — 99223 1ST HOSP IP/OBS HIGH 75: CPT

## 2025-03-01 RX ORDER — APIXABAN 2.5 MG/1
5 TABLET, FILM COATED ORAL EVERY 12 HOURS
Refills: 0 | Status: DISCONTINUED | OUTPATIENT
Start: 2025-03-01 | End: 2025-03-05

## 2025-03-01 RX ORDER — CEFTRIAXONE 500 MG/1
1000 INJECTION, POWDER, FOR SOLUTION INTRAMUSCULAR; INTRAVENOUS ONCE
Refills: 0 | Status: COMPLETED | OUTPATIENT
Start: 2025-03-01 | End: 2025-03-01

## 2025-03-01 RX ORDER — B1/B2/B3/B5/B6/B12/VIT C/FOLIC 500-0.5 MG
1 TABLET ORAL DAILY
Refills: 0 | Status: DISCONTINUED | OUTPATIENT
Start: 2025-03-01 | End: 2025-03-05

## 2025-03-01 RX ORDER — ASPIRIN 325 MG
81 TABLET ORAL DAILY
Refills: 0 | Status: DISCONTINUED | OUTPATIENT
Start: 2025-03-01 | End: 2025-03-05

## 2025-03-01 RX ORDER — ESCITALOPRAM OXALATE 20 MG/1
5 TABLET ORAL DAILY
Refills: 0 | Status: DISCONTINUED | OUTPATIENT
Start: 2025-03-01 | End: 2025-03-05

## 2025-03-01 RX ORDER — NYSTATIN 100000 [USP'U]/G
1 CREAM TOPICAL
Refills: 0 | Status: DISCONTINUED | OUTPATIENT
Start: 2025-03-01 | End: 2025-03-05

## 2025-03-01 RX ORDER — CEFTRIAXONE 500 MG/1
1000 INJECTION, POWDER, FOR SOLUTION INTRAMUSCULAR; INTRAVENOUS EVERY 24 HOURS
Refills: 0 | Status: DISCONTINUED | OUTPATIENT
Start: 2025-03-02 | End: 2025-03-04

## 2025-03-01 RX ORDER — ESCITALOPRAM OXALATE 20 MG/1
1 TABLET ORAL
Refills: 0 | DISCHARGE

## 2025-03-01 RX ORDER — APIXABAN 2.5 MG/1
1 TABLET, FILM COATED ORAL
Refills: 0 | DISCHARGE

## 2025-03-01 RX ORDER — ONDANSETRON HCL/PF 4 MG/2 ML
4 VIAL (ML) INJECTION ONCE
Refills: 0 | Status: COMPLETED | OUTPATIENT
Start: 2025-03-01 | End: 2025-03-01

## 2025-03-01 RX ORDER — ACETAMINOPHEN 500 MG/5ML
650 LIQUID (ML) ORAL EVERY 6 HOURS
Refills: 0 | Status: DISCONTINUED | OUTPATIENT
Start: 2025-03-01 | End: 2025-03-05

## 2025-03-01 RX ORDER — QUETIAPINE FUMARATE 25 MG/1
12.5 TABLET ORAL DAILY
Refills: 0 | Status: DISCONTINUED | OUTPATIENT
Start: 2025-03-01 | End: 2025-03-05

## 2025-03-01 RX ORDER — CYST/ALA/Q10/PHOS.SER/DHA/BROC 100-20-50
0 POWDER (GRAM) ORAL
Refills: 0 | DISCHARGE

## 2025-03-01 RX ORDER — B1/B2/B3/B5/B6/B12/VIT C/FOLIC 500-0.5 MG
0 TABLET ORAL
Refills: 0 | DISCHARGE

## 2025-03-01 RX ORDER — SODIUM CHLORIDE 9 G/1000ML
800 INJECTION, SOLUTION INTRAVENOUS
Refills: 0 | Status: DISCONTINUED | OUTPATIENT
Start: 2025-03-01 | End: 2025-03-03

## 2025-03-01 RX ORDER — FOLIC ACID 1 MG/1
1 TABLET ORAL DAILY
Refills: 0 | Status: DISCONTINUED | OUTPATIENT
Start: 2025-03-01 | End: 2025-03-05

## 2025-03-01 RX ADMIN — NYSTATIN 1 APPLICATION(S): 100000 CREAM TOPICAL at 07:43

## 2025-03-01 RX ADMIN — Medication 81 MILLIGRAM(S): at 12:29

## 2025-03-01 RX ADMIN — CEFTRIAXONE 100 MILLIGRAM(S): 500 INJECTION, POWDER, FOR SOLUTION INTRAMUSCULAR; INTRAVENOUS at 06:30

## 2025-03-01 RX ADMIN — Medication 1 DOSE(S): at 09:31

## 2025-03-01 RX ADMIN — Medication 100 MILLIGRAM(S): at 12:29

## 2025-03-01 RX ADMIN — NYSTATIN 1 APPLICATION(S): 100000 CREAM TOPICAL at 17:41

## 2025-03-01 RX ADMIN — Medication 2000 UNIT(S): at 12:29

## 2025-03-01 RX ADMIN — Medication 40 MILLIGRAM(S): at 10:01

## 2025-03-01 RX ADMIN — Medication 1000 MILLILITER(S): at 02:00

## 2025-03-01 RX ADMIN — QUETIAPINE FUMARATE 12.5 MILLIGRAM(S): 25 TABLET ORAL at 12:28

## 2025-03-01 RX ADMIN — Medication 1 DOSE(S): at 20:09

## 2025-03-01 RX ADMIN — APIXABAN 5 MILLIGRAM(S): 2.5 TABLET, FILM COATED ORAL at 06:30

## 2025-03-01 RX ADMIN — Medication 1000 MILLILITER(S): at 05:54

## 2025-03-01 RX ADMIN — Medication 4 MILLIGRAM(S): at 02:00

## 2025-03-01 RX ADMIN — APIXABAN 5 MILLIGRAM(S): 2.5 TABLET, FILM COATED ORAL at 17:41

## 2025-03-01 RX ADMIN — FOLIC ACID 1 MILLIGRAM(S): 1 TABLET ORAL at 12:29

## 2025-03-01 RX ADMIN — Medication 20 MILLIGRAM(S): at 02:00

## 2025-03-01 RX ADMIN — ESCITALOPRAM OXALATE 5 MILLIGRAM(S): 20 TABLET ORAL at 12:28

## 2025-03-01 RX ADMIN — Medication 1 TABLET(S): at 12:29

## 2025-03-01 NOTE — ED PROVIDER NOTE - OBJECTIVE STATEMENT
HPI obtained from chart review and from [facility] chart sent to ED:  89-year-old female with COPD, dementia, coming from [facility] assisted living facility presenting ED for evaluation of nausea, vomiting, lethargy, inability to ambulate, unknown how long this has been going on for.  Patient has nothing to contribute to history, is not sure why she is here. HPI obtained from chart review, RN from nursing home and from [facility] chart sent to ED:  89-year-old female with COPD, dementia, coming from [facility] assisted living facility presenting ED for evaluation of nausea, vomiting, lethargy, inability to ambulate, unknown how long this has been going on for.  Patient has nothing to contribute to history, is not sure why she is here.    Per nursing home RN; pt had 6-7 episodes of vomiting and foul smelling watery diarrhea yesterday, no fevers. is usually AO x1 only (to self). no norovirus going around facility.

## 2025-03-01 NOTE — PATIENT PROFILE ADULT - FALL HARM RISK - HARM RISK INTERVENTIONS

## 2025-03-01 NOTE — H&P ADULT - NSHPLABSRESULTS_GEN_ALL_CORE
13.9   13.85 )-----------( 417      ( 01 Mar 2025 01:56 )             42.7           140  |  105  |  22  ----------------------------<  118[H]  3.8   |  28  |  0.72    Ca    8.9      01 Mar 2025 01:56  Mg     2.0         TPro  6.7  /  Alb  2.8[L]  /  TBili  0.4  /  DBili  x   /  AST  23  /  ALT  16  /  AlkPhos  99         Magnesium: 2.0 mg/dL [1.6 - 2.6] (25 @ 01:56)    LIVER FUNCTIONS - ( 01 Mar 2025 01:56 )  Alb: 2.8 g/dL / Pro: 6.7 g/dL / ALK PHOS: 99 U/L / ALT: 16 U/L / AST: 23 U/L / GGT: x                         Urinalysis Basic - ( 01 Mar 2025 04:23 )    Color: Dark Yellow / Appearance: Turbid / S.037 / pH: x  Gluc: x / Ketone: Negative mg/dL  / Bili: Small / Urobili: 4.0 mg/dL   Blood: x / Protein: 100 mg/dL / Nitrite: Positive   Leuk Esterase: Large / RBC: >50 /HPF / WBC >50 /HPF   Sq Epi: x / Non Sq Epi: x / Bacteria: Too Numerous to count /HPF        Urinalysis with Rflx Culture (collected 25 @ 04:23)        EKG: personally rev. NSR at 92bpm no acute ST changes      CXR: wet read. personally rev. NAPD    ra< from: CT Head No Cont (25 @ 02:46) >    IMPRESSION:  Stable exam. No acute intracranial hemorrhage, vasogenic edema or   extra-axial collection. Chronic microvascular changes and cerebral volume   loss.    < end of copied text >    < from: CT Abdomen and Pelvis w/ IV Cont (25 @ 02:47) >    IMPRESSION:  Motion degraded exam.  Diverticulosis without evidence of diverticulitis. No acute finding in   the abdomen or pelvis allowing for motion artifact.    < end of copied text >

## 2025-03-01 NOTE — ED PROVIDER NOTE - BIRTH SEX
Female Double O-Z Flap Text: The defect edges were debeveled with a #15 scalpel blade.  Given the location of the defect, shape of the defect and the proximity to free margins a Double O-Z flap was deemed most appropriate.  Using a sterile surgical marker, an appropriate transposition flap was drawn incorporating the defect and placing the expected incisions within the relaxed skin tension lines where possible. The area thus outlined was incised deep to adipose tissue with a #15 scalpel blade.  The skin margins were undermined to an appropriate distance in all directions utilizing iris scissors.

## 2025-03-01 NOTE — ED ADULT NURSE REASSESSMENT NOTE - NS ED NURSE REASSESS COMMENT FT1
pt  has skin tear on right wrist  in various stages of  healing      pt also  has reddened excoriated diaper line area .

## 2025-03-01 NOTE — ED ADULT NURSE NOTE - CHIEF COMPLAINT QUOTE
BIBA from  [facility] c/o lethargy, not able to walk today since yesterday diarrhea/ vomiting H/O Dementia

## 2025-03-01 NOTE — ED ADULT NURSE NOTE - OBJECTIVE STATEMENT
BIBA from  [facility] c/o lethargy, not able to walk today since yesterday diarrhea/ vomiting H/O Dementia

## 2025-03-01 NOTE — PROGRESS NOTE ADULT - ASSESSMENT
9 F formerly [facility] resident former smoker with PMhx of dementia, COPD, hx of COVID in 11/2024 complicated with PE sent in by facility for vomiting, diarrhea and lethargy with UTI    #Vomiting and diarrhea.  No further vomiting noted in ED or this am  CT with no colitis.   check GI PCR panel, C diff already sent by ED.  stool cx, O/P follow up results   IVFs overnight.     #UTI  cont IV Ceftriaxone    #Dementia  cont lexapro, seroquel    #hx of PE in setting of COVID  cont Eliquis for now.     #COPD stable  cont advair    #GOC  Pt came with pre-existing MOLST. Pt is DNR/DNI.     # GI ppi   Annemarie summers.      Spoke to her son over the phone with an update

## 2025-03-01 NOTE — PATIENT PROFILE ADULT - FUNCTIONAL ASSESSMENT - BASIC MOBILITY 6.
Clear bilaterally, pupils equal, round 1-calculated by average/Not able to assess (calculate score using [facility] averaging method)

## 2025-03-01 NOTE — ED ADULT TRIAGE NOTE - CHIEF COMPLAINT QUOTE
BIBA from  [facility] c/o lethargy, not able to walk today since yesterday diarrhea/ vomiting H/O Dementia

## 2025-03-01 NOTE — ED PROVIDER NOTE - WR ORDER NAME 1
Pt had spoken with Cindy regarding her severe diarrhea.  Pt's GI doctor thought it might have been caused by Atenolol.  She stopped the Atenolol and the diarrhea is much better.  She was told to notifiy us that she does not want to take Atenolol anymore.  She has a follow up scheduled with Dr. Landa on 6/10.   Xray Chest 1 View AP/PA

## 2025-03-01 NOTE — ED PROVIDER NOTE - CLINICAL SUMMARY MEDICAL DECISION MAKING FREE TEXT BOX
labs, CTH, CT abdomen, CXR labs, CTH, CT abdomen, CXR, pepcid, ivfs, zofran    UA with UTI  labs without yvon or electrolyte changes  CT ab with diverticulosis  CTH neg  CXR with mild pulmonary edema, similar to last cxr    case discussed w/ medicine, admitted to their service. labs, CTH, CT abdomen, CXR, pepcid, ivfs, zofran    UA with UTI; last urine culture reviewed by me grew group B strep sensitive to PCN; rocephin ordered.  labs without yvon or electrolyte changes  CT ab with diverticulosis  CTH neg  CXR with mild pulmonary edema, similar to last cxr    case discussed w/ medicine, admitted to their service.

## 2025-03-01 NOTE — ED PROVIDER NOTE - PHYSICAL EXAMINATION
Constitutional: NAD AAOx3  Eyes: EOMI, pupils equal  Head: Normocephalic atraumatic  Mouth: no airway obstruction  Cardiac: regular rate   Resp: Lungs CTAB  GI: Abd s/nt/nd  Neuro: CN2-12 intact  Skin: No rashes Constitutional: NAD AOx0  Eyes: EOMI, pupils equal round reactive  Head: Normocephalic atraumatic  Mouth: no airway obstruction  Cardiac: regular rate and rhythm  Resp: Lungs CTAB  GI: Abd s/nt/nd  Neuro: CN2-12 intact  Skin: No rashes

## 2025-03-01 NOTE — H&P ADULT - HISTORY OF PRESENT ILLNESS
89 F [facility] resident former smoker with PMhx of dementia, COPD, hx of COVID in 2024 complicated with PE sent in by facility for vomiting, diarrhea and lethargy  x 1 day. Pt limited ambulation at baseline but able to participate in some transfers. Today too weak to do so. No reported fevers, cough, CP. Pt unable to provide any meaningful hx. Pt is alert, pleasant and attempts to answers questions but mostly nonsensical.   In ED afebrile P; 63 BP: 101/69 sat 99% on RA    WBC: 13.85 hgb: 13.9 78%N BUN/cr; 22/0.72  trop: 14.7 m  COVID/RSV/flu neg. UA mod blood, large LE, +nitrite >50 wbc TNTC bacteria.     CT head and CTAP neg for any acute pathology

## 2025-03-01 NOTE — H&P ADULT - NSHPPHYSICALEXAM_GEN_ALL_CORE
Vital Signs Last 24 Hrs  T(C): 36.2 (01 Mar 2025 01:00), Max: 36.2 (01 Mar 2025 01:00)  T(F): 97.2 (01 Mar 2025 01:00), Max: 97.2 (01 Mar 2025 01:00)  HR: 63 (01 Mar 2025 01:00) (63 - 63)  BP: 101/69 (01 Mar 2025 01:00) (101/69 - 101/69)  BP(mean): --  RR: 20 (01 Mar 2025 01:00) (20 - 20)  SpO2: 99% (01 Mar 2025 01:00) (99% - 99%)    Parameters below as of 01 Mar 2025 01:00  Patient On (Oxygen Delivery Method): room air      Daily Height in cm: 160.02 (01 Mar 2025 01:00)    Daily   CAPILLARY BLOOD GLUCOSE        I&O's Summary      GENERAL: NAD  HEAD:  Normocephalic  EYES: EOMI, PERRLA, conjunctiva and sclera clear  ENMT: No tonsillar erythema, exudates, or enlargement; fairly moist mucous membranes, No lesions  NECK: Supple, No JVD, no bruit, normal thyroid  NERVOUS SYSTEM:  Alert & Oriented X1, Good concentration; grossly  moves all fours  CHEST/LUNG: Clear to auscultation bilaterally; No rales, rhonchi, wheezing, or rubs  HEART: Regular rate and rhythm; No murmurs, rubs, or gallops  ABDOMEN: Soft, Nontender, Nondistended; Bowel sounds present. noted loose yellow stool on diaper  EXTREMITIES:  2+ Peripheral Pulses, No clubbing, cyanosis, or edema. + R heel early Stage1. some scabs along L leg   LYMPH: No lymphadenopathy noted  SKIN: +intertrigo groin. milder under breast and abd pannus

## 2025-03-01 NOTE — ED PROVIDER NOTE - CARE PLAN
Principal Discharge DX:	Lethargy  Secondary Diagnosis:	Gastroenteritis  Secondary Diagnosis:	Ambulatory dysfunction   1 Principal Discharge DX:	Acute UTI  Secondary Diagnosis:	Gastroenteritis  Secondary Diagnosis:	Ambulatory dysfunction

## 2025-03-01 NOTE — H&P ADULT - ASSESSMENT
89 F [facility] resident former smoker with PMhx of dementia, COPD, hx of COVID in 11/2024 complicated with PE sent in by facility for vomiting, diarrhea and lethargy with UTI    #Vomiting and diarrhea.  No further vomiting noted in ED  CT with no colitis.   check GI PCR panel, C diff already sent by ED.  stool cx, O/P  IVFs overnight.     #UTI  cont IV Ceftriaxone    #Dementia  cont lexapro, seroquel    #hx of PE in setting of COVID  cont Eliquis for now.     #COPD stable  cont advair    #GOC  Pt came with pre-existing MOLST. Pt is DNR/DNI.     [facility] rev.  D/W Dr Chadwick ED.     IMPROVE VTE Individual Risk Assessment          RISK                                                          Points  [  ] Previous VTE                                                3  [ 2 ] Thrombophilia                                             2  [2  ] Lower limb paralysis                                   2        (unable to hold up >15 seconds)    [  ] Current Cancer                                             2         (within 6 months)  [  ] Immobilization > 24 hrs                              1  [  ] ICU/CCU stay > 24 hours                             1  [ 1 ] Age > 60                                                         1    IMPROVE VTE Score:         [5      ]    Total Risk Factor Score:    0 - 1:   Consider IPC  >2 - 3:  Thromboprophylaxis required (enoxaparin or SQ heparin)        >4:   High Risk: Thromboprophylaxis required (enoxaparin or SQ heparin), optional add IPC  **If CONTRAINDICATION to enoxaparin or SQ heparin, USE IPCs**

## 2025-03-02 LAB
ALBUMIN SERPL ELPH-MCNC: 2.3 G/DL — LOW (ref 3.3–5)
ALP SERPL-CCNC: 88 U/L — SIGNIFICANT CHANGE UP (ref 40–120)
ALT FLD-CCNC: 15 U/L — SIGNIFICANT CHANGE UP (ref 10–45)
ANION GAP SERPL CALC-SCNC: 6 MMOL/L — SIGNIFICANT CHANGE UP (ref 5–17)
AST SERPL-CCNC: 18 U/L — SIGNIFICANT CHANGE UP (ref 10–40)
BILIRUB SERPL-MCNC: 0.3 MG/DL — SIGNIFICANT CHANGE UP (ref 0.2–1.2)
BUN SERPL-MCNC: 17 MG/DL — SIGNIFICANT CHANGE UP (ref 7–23)
CALCIUM SERPL-MCNC: 8.7 MG/DL — SIGNIFICANT CHANGE UP (ref 8.4–10.5)
CHLORIDE SERPL-SCNC: 111 MMOL/L — HIGH (ref 96–108)
CO2 SERPL-SCNC: 28 MMOL/L — SIGNIFICANT CHANGE UP (ref 22–31)
CREAT SERPL-MCNC: 0.63 MG/DL — SIGNIFICANT CHANGE UP (ref 0.5–1.3)
EGFR: 85 ML/MIN/1.73M2 — SIGNIFICANT CHANGE UP
EGFR: 85 ML/MIN/1.73M2 — SIGNIFICANT CHANGE UP
GLUCOSE SERPL-MCNC: 87 MG/DL — SIGNIFICANT CHANGE UP (ref 70–99)
HCT VFR BLD CALC: 38.4 % — SIGNIFICANT CHANGE UP (ref 34.5–45)
HGB BLD-MCNC: 12.2 G/DL — SIGNIFICANT CHANGE UP (ref 11.5–15.5)
MCHC RBC-ENTMCNC: 30.5 PG — SIGNIFICANT CHANGE UP (ref 27–34)
MCHC RBC-ENTMCNC: 31.8 G/DL — LOW (ref 32–36)
MCV RBC AUTO: 96 FL — SIGNIFICANT CHANGE UP (ref 80–100)
NRBC BLD AUTO-RTO: 0 /100 WBCS — SIGNIFICANT CHANGE UP (ref 0–0)
PLATELET # BLD AUTO: 364 K/UL — SIGNIFICANT CHANGE UP (ref 150–400)
POTASSIUM SERPL-MCNC: 3.6 MMOL/L — SIGNIFICANT CHANGE UP (ref 3.5–5.3)
POTASSIUM SERPL-SCNC: 3.6 MMOL/L — SIGNIFICANT CHANGE UP (ref 3.5–5.3)
PROT SERPL-MCNC: 5.6 G/DL — LOW (ref 6–8.3)
RBC # BLD: 4 M/UL — SIGNIFICANT CHANGE UP (ref 3.8–5.2)
RBC # FLD: 13.6 % — SIGNIFICANT CHANGE UP (ref 10.3–14.5)
SODIUM SERPL-SCNC: 145 MMOL/L — SIGNIFICANT CHANGE UP (ref 135–145)
WBC # BLD: 7.62 K/UL — SIGNIFICANT CHANGE UP (ref 3.8–10.5)
WBC # FLD AUTO: 7.62 K/UL — SIGNIFICANT CHANGE UP (ref 3.8–10.5)

## 2025-03-02 PROCEDURE — 99232 SBSQ HOSP IP/OBS MODERATE 35: CPT

## 2025-03-02 RX ADMIN — Medication 1 TABLET(S): at 12:44

## 2025-03-02 RX ADMIN — APIXABAN 5 MILLIGRAM(S): 2.5 TABLET, FILM COATED ORAL at 05:21

## 2025-03-02 RX ADMIN — Medication 100 MILLIGRAM(S): at 12:44

## 2025-03-02 RX ADMIN — FOLIC ACID 1 MILLIGRAM(S): 1 TABLET ORAL at 12:44

## 2025-03-02 RX ADMIN — CEFTRIAXONE 100 MILLIGRAM(S): 500 INJECTION, POWDER, FOR SOLUTION INTRAMUSCULAR; INTRAVENOUS at 05:21

## 2025-03-02 RX ADMIN — APIXABAN 5 MILLIGRAM(S): 2.5 TABLET, FILM COATED ORAL at 17:06

## 2025-03-02 RX ADMIN — Medication 40 MILLIGRAM(S): at 05:21

## 2025-03-02 RX ADMIN — Medication 2000 UNIT(S): at 12:43

## 2025-03-02 RX ADMIN — ESCITALOPRAM OXALATE 5 MILLIGRAM(S): 20 TABLET ORAL at 12:44

## 2025-03-02 RX ADMIN — QUETIAPINE FUMARATE 12.5 MILLIGRAM(S): 25 TABLET ORAL at 12:44

## 2025-03-02 RX ADMIN — Medication 81 MILLIGRAM(S): at 12:44

## 2025-03-02 RX ADMIN — Medication 1 DOSE(S): at 21:14

## 2025-03-02 RX ADMIN — NYSTATIN 1 APPLICATION(S): 100000 CREAM TOPICAL at 05:20

## 2025-03-02 RX ADMIN — NYSTATIN 1 APPLICATION(S): 100000 CREAM TOPICAL at 17:05

## 2025-03-02 RX ADMIN — Medication 1 DOSE(S): at 08:04

## 2025-03-02 NOTE — PROGRESS NOTE ADULT - ASSESSMENT
89 year old F, [facility] resident who is a former smoker with PMhx of dementia, COPD, hx of COVID in 11/2024 complicated with PE sent in by facility for vomiting, diarrhea and lethargy. On arrival, found to have a UTI. CT head negative, CT A/P with motion degredation but no colitis. Admitted for IV antibiotics.    #Vomiting and diarrhea.  - No further vomiting since admission  - CT with no colitis.   - no stool studies sent yet. patient had 1 BM on 3/1 with no studies sent. pending stool sample for GI PCR panel, C diff, stool cx. if no BM in 24 hours, can d/c  - can stop IVF    #AMS  #lethargy  -improved, CT head negative    #UTI  -cont IV Ceftriaxone  -urine culture received pending    #Dementia  -cont lexapro, seroquel    #hx of PE in setting of COVID  -cont Eliquis    #COPD stable  -cont advair    #GOC  -pre-existing MOLST.   -DNR/DNI.     anticipated discharge in 24-48 hours pending urine culture

## 2025-03-03 LAB
ALBUMIN SERPL ELPH-MCNC: 2.7 G/DL — LOW (ref 3.3–5)
ALP SERPL-CCNC: 96 U/L — SIGNIFICANT CHANGE UP (ref 40–120)
ALT FLD-CCNC: 15 U/L — SIGNIFICANT CHANGE UP (ref 10–45)
ANION GAP SERPL CALC-SCNC: 10 MMOL/L — SIGNIFICANT CHANGE UP (ref 5–17)
AST SERPL-CCNC: 23 U/L — SIGNIFICANT CHANGE UP (ref 10–40)
BILIRUB SERPL-MCNC: 0.3 MG/DL — SIGNIFICANT CHANGE UP (ref 0.2–1.2)
BUN SERPL-MCNC: 16 MG/DL — SIGNIFICANT CHANGE UP (ref 7–23)
CALCIUM SERPL-MCNC: 9 MG/DL — SIGNIFICANT CHANGE UP (ref 8.4–10.5)
CHLORIDE SERPL-SCNC: 108 MMOL/L — SIGNIFICANT CHANGE UP (ref 96–108)
CO2 SERPL-SCNC: 26 MMOL/L — SIGNIFICANT CHANGE UP (ref 22–31)
CREAT SERPL-MCNC: 0.5 MG/DL — SIGNIFICANT CHANGE UP (ref 0.5–1.3)
EGFR: 90 ML/MIN/1.73M2 — SIGNIFICANT CHANGE UP
EGFR: 90 ML/MIN/1.73M2 — SIGNIFICANT CHANGE UP
GLUCOSE SERPL-MCNC: 71 MG/DL — SIGNIFICANT CHANGE UP (ref 70–99)
HCT VFR BLD CALC: 38.6 % — SIGNIFICANT CHANGE UP (ref 34.5–45)
HGB BLD-MCNC: 12.2 G/DL — SIGNIFICANT CHANGE UP (ref 11.5–15.5)
MCHC RBC-ENTMCNC: 31 PG — SIGNIFICANT CHANGE UP (ref 27–34)
MCHC RBC-ENTMCNC: 31.6 G/DL — LOW (ref 32–36)
MCV RBC AUTO: 98 FL — SIGNIFICANT CHANGE UP (ref 80–100)
NRBC BLD AUTO-RTO: 0 /100 WBCS — SIGNIFICANT CHANGE UP (ref 0–0)
PLATELET # BLD AUTO: 348 K/UL — SIGNIFICANT CHANGE UP (ref 150–400)
POTASSIUM SERPL-MCNC: 3.6 MMOL/L — SIGNIFICANT CHANGE UP (ref 3.5–5.3)
POTASSIUM SERPL-SCNC: 3.6 MMOL/L — SIGNIFICANT CHANGE UP (ref 3.5–5.3)
PROT SERPL-MCNC: 6.4 G/DL — SIGNIFICANT CHANGE UP (ref 6–8.3)
RBC # BLD: 3.94 M/UL — SIGNIFICANT CHANGE UP (ref 3.8–5.2)
RBC # FLD: 13.7 % — SIGNIFICANT CHANGE UP (ref 10.3–14.5)
SODIUM SERPL-SCNC: 144 MMOL/L — SIGNIFICANT CHANGE UP (ref 135–145)
WBC # BLD: 8.71 K/UL — SIGNIFICANT CHANGE UP (ref 3.8–10.5)
WBC # FLD AUTO: 8.71 K/UL — SIGNIFICANT CHANGE UP (ref 3.8–10.5)

## 2025-03-03 PROCEDURE — 99233 SBSQ HOSP IP/OBS HIGH 50: CPT

## 2025-03-03 RX ADMIN — APIXABAN 5 MILLIGRAM(S): 2.5 TABLET, FILM COATED ORAL at 17:23

## 2025-03-03 RX ADMIN — NYSTATIN 1 APPLICATION(S): 100000 CREAM TOPICAL at 06:23

## 2025-03-03 RX ADMIN — NYSTATIN 1 APPLICATION(S): 100000 CREAM TOPICAL at 17:23

## 2025-03-03 RX ADMIN — Medication 2000 UNIT(S): at 13:01

## 2025-03-03 RX ADMIN — Medication 100 MILLIGRAM(S): at 13:01

## 2025-03-03 RX ADMIN — Medication 1 DOSE(S): at 20:44

## 2025-03-03 RX ADMIN — Medication 81 MILLIGRAM(S): at 13:01

## 2025-03-03 RX ADMIN — QUETIAPINE FUMARATE 12.5 MILLIGRAM(S): 25 TABLET ORAL at 13:00

## 2025-03-03 RX ADMIN — CEFTRIAXONE 100 MILLIGRAM(S): 500 INJECTION, POWDER, FOR SOLUTION INTRAMUSCULAR; INTRAVENOUS at 06:59

## 2025-03-03 RX ADMIN — APIXABAN 5 MILLIGRAM(S): 2.5 TABLET, FILM COATED ORAL at 06:33

## 2025-03-03 RX ADMIN — Medication 40 MILLIGRAM(S): at 06:33

## 2025-03-03 RX ADMIN — Medication 1 TABLET(S): at 13:01

## 2025-03-03 RX ADMIN — FOLIC ACID 1 MILLIGRAM(S): 1 TABLET ORAL at 13:00

## 2025-03-03 RX ADMIN — ESCITALOPRAM OXALATE 5 MILLIGRAM(S): 20 TABLET ORAL at 13:01

## 2025-03-03 NOTE — PROGRESS NOTE ADULT - ASSESSMENT
88 y/o F, from [facility], who is a former smoker with PMH of dementia, chronic COPD, hx of COVID in 11/2024 complicated with PE sent in by facility for vomiting, diarrhea and lethargy. On arrival, found to have a UTI. CT head negative, CT A/P with motion degredation but no colitis. Admitted for IV antibiotics.    #Acute metabolic encephalopathy due to acute UTI - improved  #Lethargy - resolved  -Improved, CT head negative    #E coli UTI  -Cont IV Ceftriaxone  -Urine cx 3/1 +ecoli, follow s/s    #Vomiting and diarrhea - resolved  -No further vomiting since admission  -CT with no colitis  -No further episodes of diarrhea. will DC stool studies. Cdiff ruled out  -DC IVF    #Dementia  -Cont lexapro, seroquel    #Hx of PE in setting of COVID  -Cont Eliquis    #COPD - chronic, stable  -Cont advair    #GOC  -pre-existing MOLST  -DNR/DNI    Dispo - [facility] pending final urine cx 88 y/o F, from [facility], who is a former smoker with PMH of dementia, chronic COPD, hx of COVID in 11/2024 complicated with PE sent in by facility for vomiting, diarrhea and lethargy. On arrival, found to have a UTI. CT head negative, CT A/P with motion degredation but no colitis. Admitted for IV antibiotics.    #Acute metabolic encephalopathy due to acute UTI - improved  #Lethargy - resolved  -Improved, CT head negative    #E coli UTI  -Cont IV Ceftriaxone  -Urine cx 3/1 +ecoli, follow s/s    #Vomiting and diarrhea - resolved  -No further vomiting since admission  -CT with no colitis  -No further episodes of diarrhea. will DC stool studies. Cdiff ruled out  -DC IVF    #Dementia  -Cont lexapro, seroquel    #Hx of PE in setting of COVID  -Cont Eliquis    #COPD - chronic, stable, not in acute exacerbation  -Cont advair    #GOC  -pre-existing MOLST  -DNR/DNI    Dispo - [facility] pending final urine cx

## 2025-03-04 LAB
-  AMPICILLIN/SULBACTAM: SIGNIFICANT CHANGE UP
-  AMPICILLIN: SIGNIFICANT CHANGE UP
-  AZTREONAM: SIGNIFICANT CHANGE UP
-  CEFAZOLIN: SIGNIFICANT CHANGE UP
-  CEFEPIME: SIGNIFICANT CHANGE UP
-  CEFTRIAXONE: SIGNIFICANT CHANGE UP
-  CEFUROXIME: SIGNIFICANT CHANGE UP
-  CIPROFLOXACIN: SIGNIFICANT CHANGE UP
-  ERTAPENEM: SIGNIFICANT CHANGE UP
-  GENTAMICIN: SIGNIFICANT CHANGE UP
-  IMIPENEM: SIGNIFICANT CHANGE UP
-  LEVOFLOXACIN: SIGNIFICANT CHANGE UP
-  MEROPENEM: SIGNIFICANT CHANGE UP
-  NITROFURANTOIN: SIGNIFICANT CHANGE UP
-  PIPERACILLIN/TAZOBACTAM: SIGNIFICANT CHANGE UP
-  TOBRAMYCIN: SIGNIFICANT CHANGE UP
-  TRIMETHOPRIM/SULFAMETHOXAZOLE: SIGNIFICANT CHANGE UP
ANION GAP SERPL CALC-SCNC: 7 MMOL/L — SIGNIFICANT CHANGE UP (ref 5–17)
BUN SERPL-MCNC: 18 MG/DL — SIGNIFICANT CHANGE UP (ref 7–23)
CALCIUM SERPL-MCNC: 8.8 MG/DL — SIGNIFICANT CHANGE UP (ref 8.4–10.5)
CHLORIDE SERPL-SCNC: 106 MMOL/L — SIGNIFICANT CHANGE UP (ref 96–108)
CO2 SERPL-SCNC: 27 MMOL/L — SIGNIFICANT CHANGE UP (ref 22–31)
CREAT SERPL-MCNC: 0.6 MG/DL — SIGNIFICANT CHANGE UP (ref 0.5–1.3)
CULTURE RESULTS: ABNORMAL
CULTURE RESULTS: ABNORMAL
EGFR: 86 ML/MIN/1.73M2 — SIGNIFICANT CHANGE UP
EGFR: 86 ML/MIN/1.73M2 — SIGNIFICANT CHANGE UP
GLUCOSE SERPL-MCNC: 90 MG/DL — SIGNIFICANT CHANGE UP (ref 70–99)
HCT VFR BLD CALC: 39.1 % — SIGNIFICANT CHANGE UP (ref 34.5–45)
HGB BLD-MCNC: 12.8 G/DL — SIGNIFICANT CHANGE UP (ref 11.5–15.5)
MCHC RBC-ENTMCNC: 31.1 PG — SIGNIFICANT CHANGE UP (ref 27–34)
MCHC RBC-ENTMCNC: 32.7 G/DL — SIGNIFICANT CHANGE UP (ref 32–36)
MCV RBC AUTO: 94.9 FL — SIGNIFICANT CHANGE UP (ref 80–100)
METHOD TYPE: SIGNIFICANT CHANGE UP
NRBC BLD AUTO-RTO: 0 /100 WBCS — SIGNIFICANT CHANGE UP (ref 0–0)
ORGANISM # SPEC MICROSCOPIC CNT: ABNORMAL
ORGANISM # SPEC MICROSCOPIC CNT: SIGNIFICANT CHANGE UP
PLATELET # BLD AUTO: 374 K/UL — SIGNIFICANT CHANGE UP (ref 150–400)
POTASSIUM SERPL-MCNC: 3.6 MMOL/L — SIGNIFICANT CHANGE UP (ref 3.5–5.3)
POTASSIUM SERPL-SCNC: 3.6 MMOL/L — SIGNIFICANT CHANGE UP (ref 3.5–5.3)
RBC # BLD: 4.12 M/UL — SIGNIFICANT CHANGE UP (ref 3.8–5.2)
RBC # FLD: 13.4 % — SIGNIFICANT CHANGE UP (ref 10.3–14.5)
SARS-COV-2 RNA SPEC QL NAA+PROBE: SIGNIFICANT CHANGE UP
SODIUM SERPL-SCNC: 140 MMOL/L — SIGNIFICANT CHANGE UP (ref 135–145)
SPECIMEN SOURCE: SIGNIFICANT CHANGE UP
WBC # BLD: 9.49 K/UL — SIGNIFICANT CHANGE UP (ref 3.8–10.5)
WBC # FLD AUTO: 9.49 K/UL — SIGNIFICANT CHANGE UP (ref 3.8–10.5)

## 2025-03-04 PROCEDURE — 99233 SBSQ HOSP IP/OBS HIGH 50: CPT

## 2025-03-04 RX ORDER — PIPERACILLIN-TAZO-DEXTROSE,ISO 3.375G/5
3.38 IV SOLUTION, PIGGYBACK PREMIX FROZEN(ML) INTRAVENOUS ONCE
Refills: 0 | Status: COMPLETED | OUTPATIENT
Start: 2025-03-04 | End: 2025-03-04

## 2025-03-04 RX ORDER — PIPERACILLIN-TAZO-DEXTROSE,ISO 3.375G/5
3.38 IV SOLUTION, PIGGYBACK PREMIX FROZEN(ML) INTRAVENOUS EVERY 8 HOURS
Refills: 0 | Status: DISCONTINUED | OUTPATIENT
Start: 2025-03-05 | End: 2025-03-05

## 2025-03-04 RX ORDER — PIPERACILLIN-TAZO-DEXTROSE,ISO 3.375G/5
3.38 IV SOLUTION, PIGGYBACK PREMIX FROZEN(ML) INTRAVENOUS ONCE
Refills: 0 | Status: COMPLETED | OUTPATIENT
Start: 2025-03-04 | End: 2025-03-05

## 2025-03-04 RX ADMIN — Medication 1 DOSE(S): at 10:32

## 2025-03-04 RX ADMIN — NYSTATIN 1 APPLICATION(S): 100000 CREAM TOPICAL at 18:00

## 2025-03-04 RX ADMIN — ESCITALOPRAM OXALATE 5 MILLIGRAM(S): 20 TABLET ORAL at 11:35

## 2025-03-04 RX ADMIN — CEFTRIAXONE 100 MILLIGRAM(S): 500 INJECTION, POWDER, FOR SOLUTION INTRAMUSCULAR; INTRAVENOUS at 05:24

## 2025-03-04 RX ADMIN — Medication 100 MILLIGRAM(S): at 11:36

## 2025-03-04 RX ADMIN — Medication 200 GRAM(S): at 20:37

## 2025-03-04 RX ADMIN — Medication 81 MILLIGRAM(S): at 11:35

## 2025-03-04 RX ADMIN — Medication 1 TABLET(S): at 11:36

## 2025-03-04 RX ADMIN — NYSTATIN 1 APPLICATION(S): 100000 CREAM TOPICAL at 05:23

## 2025-03-04 RX ADMIN — Medication 2000 UNIT(S): at 11:35

## 2025-03-04 RX ADMIN — APIXABAN 5 MILLIGRAM(S): 2.5 TABLET, FILM COATED ORAL at 05:23

## 2025-03-04 RX ADMIN — QUETIAPINE FUMARATE 12.5 MILLIGRAM(S): 25 TABLET ORAL at 11:36

## 2025-03-04 RX ADMIN — FOLIC ACID 1 MILLIGRAM(S): 1 TABLET ORAL at 11:35

## 2025-03-04 RX ADMIN — Medication 40 MILLIGRAM(S): at 05:23

## 2025-03-04 RX ADMIN — APIXABAN 5 MILLIGRAM(S): 2.5 TABLET, FILM COATED ORAL at 18:01

## 2025-03-04 NOTE — PROGRESS NOTE ADULT - ASSESSMENT
89 y/o F, from [facility], who is a former smoker with PMH of dementia, chronic COPD, hx of COVID in 11/2024 complicated with PE sent in by facility for vomiting, diarrhea and lethargy. On arrival, found to have a UTI. CT head negative, CT A/P with motion degredation but no colitis. Admitted for IV antibiotics.    #Acute metabolic encephalopathy due to acute UTI - improved  #Lethargy - resolved  -Improved, CT head negative    #E coli UTI  -Cont IV Ceftriaxone(3/1- ), will transition to PO  -Urine cx 3/1 +ecoli, follow s/s    #Vomiting and diarrhea - resolved  -No further vomiting since admission  -CT with no colitis  -No further episodes of diarrhea. will DC stool studies. Cdiff ruled out  -DC IVF    #Dementia  -Cont lexapro, seroquel    #Hx of PE in setting of COVID  -Cont Eliquis    #COPD - chronic, stable, not in acute exacerbation  -Cont advair    #GOC  -Pre-existing MOLST  -DNR/DNI    Dispo - [facility] pending final urine cx   89 y/o F, from [facility], who is a former smoker with PMH of dementia, chronic COPD, hx of COVID in 11/2024 complicated with PE sent in by facility for vomiting, diarrhea and lethargy. On arrival, found to have a UTI. CT head negative, CT A/P with motion degredation but no colitis. Admitted for IV antibiotics.    #Acute metabolic encephalopathy due to acute UTI - improved  #Lethargy - resolved  -Improved, CT head negative    #E coli UTI  -Cont IV Ceftriaxone(3/1- ), will transition to PO  -Urine cx 3/1 +ecoli, follow s/s    #Vomiting and diarrhea - resolved  -No further vomiting since admission  -CT with no colitis  -No further episodes of diarrhea. will DC stool studies. Cdiff ruled out  -Off IVF    #Dementia  -Cont lexapro, seroquel    #Hx of PE in setting of COVID  -Cont Eliquis    #COPD - chronic, stable, not in acute exacerbation  -Cont advair    #GOC  -Pre-existing MOLST  -DNR/DNI    Dispo - [facility] 24h pending final urine cx

## 2025-03-04 NOTE — INPATIENT CERTIFICATION FOR MEDICARE PATIENTS - IN ORDER TO MEET MEDICARE REQUIREMENTS.
In order to meet Medicare requirements, the clinical documentation must support the information cited in the admission order.  Please be sure to provide detailed and clear documentation about the following in the admitting note/history and physical: Quality 431: Preventive Care And Screening: Unhealthy Alcohol Use - Screening: Patient not identified as an unhealthy alcohol user when screened for unhealthy alcohol use using a systematic screening method Quality 130: Documentation Of Current Medications In The Medical Record: Current Medications Documented Quality 226: Preventive Care And Screening: Tobacco Use: Screening And Cessation Intervention: Patient screened for tobacco use and is an ex/non-smoker Detail Level: Detailed Quality 110: Preventive Care And Screening: Influenza Immunization: Influenza Immunization Ordered or Recommended, but not Administered due to system reason

## 2025-03-04 NOTE — CHART NOTE - NSCHARTNOTEFT_GEN_A_CORE
Notified by RN that patient's urine culture is positive for ESBL e coli. Culture resistant to CTX, but sensitive to zosyn. Started IV Zosyn.

## 2025-03-05 ENCOUNTER — TRANSCRIPTION ENCOUNTER (OUTPATIENT)
Age: 89
End: 2025-03-05

## 2025-03-05 VITALS — HEART RATE: 82 BPM | DIASTOLIC BLOOD PRESSURE: 78 MMHG | SYSTOLIC BLOOD PRESSURE: 118 MMHG | OXYGEN SATURATION: 96 %

## 2025-03-05 LAB
ANION GAP SERPL CALC-SCNC: 8 MMOL/L — SIGNIFICANT CHANGE UP (ref 5–17)
BUN SERPL-MCNC: 20 MG/DL — SIGNIFICANT CHANGE UP (ref 7–23)
CALCIUM SERPL-MCNC: 9.1 MG/DL — SIGNIFICANT CHANGE UP (ref 8.4–10.5)
CHLORIDE SERPL-SCNC: 102 MMOL/L — SIGNIFICANT CHANGE UP (ref 96–108)
CO2 SERPL-SCNC: 26 MMOL/L — SIGNIFICANT CHANGE UP (ref 22–31)
CREAT SERPL-MCNC: 0.72 MG/DL — SIGNIFICANT CHANGE UP (ref 0.5–1.3)
EGFR: 80 ML/MIN/1.73M2 — SIGNIFICANT CHANGE UP
EGFR: 80 ML/MIN/1.73M2 — SIGNIFICANT CHANGE UP
GLUCOSE SERPL-MCNC: 99 MG/DL — SIGNIFICANT CHANGE UP (ref 70–99)
HCT VFR BLD CALC: 41.7 % — SIGNIFICANT CHANGE UP (ref 34.5–45)
HGB BLD-MCNC: 13.5 G/DL — SIGNIFICANT CHANGE UP (ref 11.5–15.5)
MCHC RBC-ENTMCNC: 30.8 PG — SIGNIFICANT CHANGE UP (ref 27–34)
MCHC RBC-ENTMCNC: 32.4 G/DL — SIGNIFICANT CHANGE UP (ref 32–36)
MCV RBC AUTO: 95.2 FL — SIGNIFICANT CHANGE UP (ref 80–100)
NRBC BLD AUTO-RTO: 0 /100 WBCS — SIGNIFICANT CHANGE UP (ref 0–0)
PLATELET # BLD AUTO: 401 K/UL — HIGH (ref 150–400)
POTASSIUM SERPL-MCNC: 4 MMOL/L — SIGNIFICANT CHANGE UP (ref 3.5–5.3)
POTASSIUM SERPL-SCNC: 4 MMOL/L — SIGNIFICANT CHANGE UP (ref 3.5–5.3)
RBC # BLD: 4.38 M/UL — SIGNIFICANT CHANGE UP (ref 3.8–5.2)
RBC # FLD: 13.6 % — SIGNIFICANT CHANGE UP (ref 10.3–14.5)
SODIUM SERPL-SCNC: 136 MMOL/L — SIGNIFICANT CHANGE UP (ref 135–145)
WBC # BLD: 10.11 K/UL — SIGNIFICANT CHANGE UP (ref 3.8–10.5)
WBC # FLD AUTO: 10.11 K/UL — SIGNIFICANT CHANGE UP (ref 3.8–10.5)

## 2025-03-05 PROCEDURE — 81001 URINALYSIS AUTO W/SCOPE: CPT

## 2025-03-05 PROCEDURE — 99285 EMERGENCY DEPT VISIT HI MDM: CPT | Mod: 25

## 2025-03-05 PROCEDURE — 80053 COMPREHEN METABOLIC PANEL: CPT

## 2025-03-05 PROCEDURE — 74177 CT ABD & PELVIS W/CONTRAST: CPT | Mod: MC

## 2025-03-05 PROCEDURE — 84484 ASSAY OF TROPONIN QUANT: CPT

## 2025-03-05 PROCEDURE — 83735 ASSAY OF MAGNESIUM: CPT

## 2025-03-05 PROCEDURE — 82550 ASSAY OF CK (CPK): CPT

## 2025-03-05 PROCEDURE — 80048 BASIC METABOLIC PNL TOTAL CA: CPT

## 2025-03-05 PROCEDURE — 87086 URINE CULTURE/COLONY COUNT: CPT

## 2025-03-05 PROCEDURE — 87635 SARS-COV-2 COVID-19 AMP PRB: CPT

## 2025-03-05 PROCEDURE — 82803 BLOOD GASES ANY COMBINATION: CPT

## 2025-03-05 PROCEDURE — 87186 SC STD MICRODIL/AGAR DIL: CPT

## 2025-03-05 PROCEDURE — 85027 COMPLETE CBC AUTOMATED: CPT

## 2025-03-05 PROCEDURE — 36415 COLL VENOUS BLD VENIPUNCTURE: CPT

## 2025-03-05 PROCEDURE — 94640 AIRWAY INHALATION TREATMENT: CPT

## 2025-03-05 PROCEDURE — 71045 X-RAY EXAM CHEST 1 VIEW: CPT

## 2025-03-05 PROCEDURE — 85025 COMPLETE CBC W/AUTO DIFF WBC: CPT

## 2025-03-05 PROCEDURE — 99239 HOSP IP/OBS DSCHRG MGMT >30: CPT

## 2025-03-05 PROCEDURE — 96375 TX/PRO/DX INJ NEW DRUG ADDON: CPT

## 2025-03-05 PROCEDURE — 93005 ELECTROCARDIOGRAM TRACING: CPT

## 2025-03-05 PROCEDURE — 87637 SARSCOV2&INF A&B&RSV AMP PRB: CPT

## 2025-03-05 PROCEDURE — 96361 HYDRATE IV INFUSION ADD-ON: CPT

## 2025-03-05 PROCEDURE — 97162 PT EVAL MOD COMPLEX 30 MIN: CPT

## 2025-03-05 PROCEDURE — 70450 CT HEAD/BRAIN W/O DYE: CPT | Mod: MC

## 2025-03-05 PROCEDURE — 96374 THER/PROPH/DIAG INJ IV PUSH: CPT

## 2025-03-05 RX ORDER — NITROFURANTOIN MACROCRYSTAL 100 MG
1 CAPSULE ORAL
Qty: 6 | Refills: 0
Start: 2025-03-05 | End: 2025-03-07

## 2025-03-05 RX ORDER — LACTOBACILLUS ACIDOPHILUS/PECT 75 MM-100
1 CAPSULE ORAL DAILY
Refills: 0 | Status: DISCONTINUED | OUTPATIENT
Start: 2025-03-05 | End: 2025-03-05

## 2025-03-05 RX ORDER — LACTOBACILLUS ACIDOPHILUS/PECT 75 MM-100
1 CAPSULE ORAL
Qty: 14 | Refills: 0
Start: 2025-03-05 | End: 2025-03-18

## 2025-03-05 RX ADMIN — Medication 81 MILLIGRAM(S): at 11:51

## 2025-03-05 RX ADMIN — QUETIAPINE FUMARATE 12.5 MILLIGRAM(S): 25 TABLET ORAL at 11:51

## 2025-03-05 RX ADMIN — Medication 100 MILLIGRAM(S): at 11:55

## 2025-03-05 RX ADMIN — Medication 25 GRAM(S): at 05:23

## 2025-03-05 RX ADMIN — Medication 1 TABLET(S): at 11:54

## 2025-03-05 RX ADMIN — Medication 1 TABLET(S): at 11:51

## 2025-03-05 RX ADMIN — FOLIC ACID 1 MILLIGRAM(S): 1 TABLET ORAL at 11:53

## 2025-03-05 RX ADMIN — NYSTATIN 1 APPLICATION(S): 100000 CREAM TOPICAL at 05:23

## 2025-03-05 RX ADMIN — Medication 25 GRAM(S): at 00:45

## 2025-03-05 RX ADMIN — Medication 40 MILLIGRAM(S): at 05:23

## 2025-03-05 RX ADMIN — APIXABAN 5 MILLIGRAM(S): 2.5 TABLET, FILM COATED ORAL at 05:23

## 2025-03-05 RX ADMIN — Medication 2000 UNIT(S): at 11:51

## 2025-03-05 RX ADMIN — ESCITALOPRAM OXALATE 5 MILLIGRAM(S): 20 TABLET ORAL at 11:54

## 2025-03-05 NOTE — DISCHARGE NOTE NURSING/CASE MANAGEMENT/SOCIAL WORK - NSDCPEFALRISK_GEN_ALL_CORE
For information on Fall & Injury Prevention, visit: [email or web address] OR  [email or web address] OR  https://www.cdc.gov/steadi/patient.html

## 2025-03-05 NOTE — DISCHARGE NOTE PROVIDER - NSDCMRMEDTOKEN_GEN_ALL_CORE_FT
aspirin 81 mg oral tablet, chewable: 1 tab(s) orally once a day  cholecalciferol oral tablet: 1 tab(s) orally once a day 2000 unit(s) orally once a day  Eliquis 5 mg oral tablet: 1 tab(s) orally 2 times a day  escitalopram 5 mg oral tablet: 1 tab(s) orally once a day  Fluticasone-Salmeterol: 2 times a day  folic acid 1 mg oral tablet: 1 tab(s) orally once a day  multivitamin: once a day  PreserVision AREDS 2 oral capsule: orally once a day  Seroquel 25 mg oral tablet: 0.5 tab(s) orally once a day  thiamine 100 mg oral tablet: 1 tab(s) orally once a day   Acidophilus Probiotic Blend oral capsule: 1 cap(s) orally once a day orally  aspirin 81 mg oral tablet, chewable: 1 tab(s) orally once a day  cholecalciferol oral tablet: 1 tab(s) orally once a day 2000 unit(s) orally once a day  Eliquis 5 mg oral tablet: 1 tab(s) orally 2 times a day  escitalopram 5 mg oral tablet: 1 tab(s) orally once a day  Fluticasone-Salmeterol: 2 times a day  folic acid 1 mg oral tablet: 1 tab(s) orally once a day  Macrobid 100 mg oral capsule: 1 cap(s) orally 2 times a day  multivitamin: once a day  pantoprazole 40 mg oral delayed release tablet: 1 tab(s) orally once a day (before a meal)  PreserVision AREDS 2 oral capsule: orally once a day  Seroquel 25 mg oral tablet: 0.5 tab(s) orally once a day  thiamine 100 mg oral tablet: 1 tab(s) orally once a day

## 2025-03-05 NOTE — PHYSICAL THERAPY INITIAL EVALUATION ADULT - PERTINENT HX OF CURRENT PROBLEM, REHAB EVAL
89 y/o F, from [facility], who is a former smoker with PMH of dementia, chronic COPD, hx of COVID in 11/2024 complicated with PE sent in by facility for vomiting, diarrhea and lethargy. On arrival, found to have a UTI. CT head negative, CT A/P with motion degredation but no colitis. Admitted for IV antibiotics.

## 2025-03-05 NOTE — DISCHARGE NOTE NURSING/CASE MANAGEMENT/SOCIAL WORK - PATIENT PORTAL LINK FT
You can access the FollowMyHealth Patient Portal offered by [facility] by registering at the following website: [email or web address]. By joining [employer]’s FollowMyHealth portal, you will also be able to view your health information using other applications (apps) compatible with our system.

## 2025-03-05 NOTE — PROGRESS NOTE ADULT - ASSESSMENT
88 y/o F, from [facility], who is a former smoker with PMH of dementia, chronic COPD, hx of COVID in 11/2024 complicated with PE sent in by facility for vomiting, diarrhea and lethargy. On arrival, found to have a UTI. CT head negative, CT A/P with motion degredation but no colitis. Admitted for IV antibiotics.    #Acute metabolic encephalopathy due to acute UTI - improved  #Lethargy - resolved  -Improved, CT head negative    #ESBL ecoli UTI  -Urine cx 3/1 +ecoli, s/s reviewed  -IV Ceftriaxone(3/1-3/4) switched to zosyn (3/4- ), will transition to PO for dispo planning    #Vomiting and diarrhea - resolved  -No further vomiting since admission  -CT with no colitis  -No further episodes of diarrhea. will DC stool studies. Cdiff ruled out  -Off IVF    #Dementia  -Cont lexapro, seroquel    #Hx of PE in setting of COVID  -Cont Eliquis    #COPD - chronic, stable, not in acute exacerbation  -Cont advair    #GOC  -Pre-existing MOLST  -DNR/DNI    Dispo - [facility]

## 2025-03-05 NOTE — PHARMACOTHERAPY INTERVENTION NOTE - COMMENTS
ESBL E coli uncomplicated UTI, recommend PO option nitrofurantoin for discharge, renal fnx appropriate, d/w Dr. Doyle

## 2025-03-05 NOTE — PROGRESS NOTE ADULT - TIME-BASED BILLING (NON-CRITICAL CARE)
Time-based billing (NON-critical care)
No

## 2025-03-05 NOTE — DISCHARGE NOTE PROVIDER - NSDCCPCAREPLAN_GEN_ALL_CORE_FT
PRINCIPAL DISCHARGE DIAGNOSIS  Diagnosis: Acute UTI  Assessment and Plan of Treatment: You came to the hospital due to confusion, weakness  You were diagnosed with UTI  You were treated with IV antibiotics  You were prescribed the following new medications: macrobid  You will need to follow up with your primary care physician for further management.      SECONDARY DISCHARGE DIAGNOSES  Diagnosis: Gastroenteritis  Assessment and Plan of Treatment:     Diagnosis: Ambulatory dysfunction  Assessment and Plan of Treatment:

## 2025-03-05 NOTE — DISCHARGE NOTE PROVIDER - HOSPITAL COURSE
HPI:  89 F [facility] resident former smoker with PMhx of dementia, COPD, hx of COVID in 2024 complicated with PE sent in by facility for vomiting, diarrhea and lethargy  x 1 day. Pt limited ambulation at baseline but able to participate in some transfers. Today too weak to do so. No reported fevers, cough, CP. Pt unable to provide any meaningful hx. Pt is alert, pleasant and attempts to answers questions but mostly nonsensical. In ED afebrile P; 63 BP: 101/69 sat 99% on RA. WBC: 13.85 hgb: 13.9 78%N BUN/cr; 22/0.72  trop: 14.7 m. COVID/RSV/flu neg. UA mod blood, large LE, +nitrite >50 wbc TNTC bacteria. CT head and CTAP neg for any acute pathology. (01 Mar 2025 04:40)      Hospital Course Summary:  UA grossly positive, admitted for acute metabolic encephalopathy due to acute UTI, started on empiric CFTX. Nausea, vomiting, diarrhea resolved. Urine cx 3/1/25 significant for ESBL ecoli, transitioned to IV zosyn, to complete course with PO macrobid upon discharge. Mentation improved to baseline.  Patient medically optimized for discharge to [facility].    Code Status: DNR/DNI    Source of Infection: ESBL Ecoli UTI  Antibiotic / Last Day: macrobid, last day 3/8/25    Discharging Provider: Kusum Doyle DO   Contact Info: [phone]    Outpatient Provider:     Time Spent: 45 minutes   HPI:  89 F [facility] resident former smoker with PMhx of dementia, COPD, hx of COVID in 2024 complicated with PE sent in by facility for vomiting, diarrhea and lethargy  x 1 day. Pt limited ambulation at baseline but able to participate in some transfers. Today too weak to do so. No reported fevers, cough, CP. Pt unable to provide any meaningful hx. Pt is alert, pleasant and attempts to answers questions but mostly nonsensical. In ED afebrile P; 63 BP: 101/69 sat 99% on RA. WBC: 13.85 hgb: 13.9 78%N BUN/cr; 22/0.72  trop: 14.7 m. COVID/RSV/flu neg. UA mod blood, large LE, +nitrite >50 wbc TNTC bacteria. CT head and CTAP neg for any acute pathology. (01 Mar 2025 04:40)      Hospital Course Summary:  UA grossly positive, admitted for acute metabolic encephalopathy due to acute UTI, started on empiric CFTX. Nausea, vomiting, diarrhea resolved. Urine cx 3/1/25 significant for ESBL ecoli, transitioned to IV zosyn, to complete course with PO macrobid upon discharge. Mentation improved to baseline.  Patient medically optimized for discharge to [facility].    Code Status: DNR/DNI    Source of Infection: ESBL Ecoli UTI  Antibiotic / Last Day: macrobid, last day 3/8/25    Discharging Provider: Kusum Doyle DO   Contact Info: [phone]    Time Spent: 45 minutes

## 2025-03-05 NOTE — PROGRESS NOTE ADULT - SUBJECTIVE AND OBJECTIVE BOX
Patient is a 89y old  Female who presents with a chief complaint of UTI (04 Mar 2025 07:36)      Patient seen and examined at bedside. feeling well today. denies headache, fever, chills, cp, sob, n/v, abd pain.     ALLERGIES:  No Known Allergies    MEDICATIONS  (STANDING):  apixaban 5 milliGRAM(s) Oral every 12 hours  aspirin  chewable 81 milliGRAM(s) Oral daily  cholecalciferol 2000 Unit(s) Oral daily  escitalopram 5 milliGRAM(s) Oral daily  fluticasone propionate/ salmeterol 500-50 MICROgram(s) Diskus 1 Dose(s) Inhalation two times a day  folic acid 1 milliGRAM(s) Oral daily  lactobacillus acidophilus 1 Tablet(s) Oral daily  multivitamin 1 Tablet(s) Oral daily  nystatin Powder 1 Application(s) Topical two times a day  pantoprazole    Tablet 40 milliGRAM(s) Oral before breakfast  piperacillin/tazobactam IVPB.. 3.375 Gram(s) IV Intermittent every 8 hours  QUEtiapine 12.5 milliGRAM(s) Oral daily  thiamine 100 milliGRAM(s) Oral daily    MEDICATIONS  (PRN):  acetaminophen     Tablet .. 650 milliGRAM(s) Oral every 6 hours PRN Mild Pain (1 - 3)    Vital Signs Last 24 Hrs  T(F): 98.4 (05 Mar 2025 11:22), Max: 98.4 (04 Mar 2025 19:02)  HR: 85 (05 Mar 2025 11:22) (78 - 91)  BP: 124/80 (05 Mar 2025 11:22) (102/68 - 137/87)  RR: 17 (05 Mar 2025 11:22) (17 - 18)  SpO2: 96% (05 Mar 2025 11:22) (93% - 96%)  I&O's Summary    04 Mar 2025 07:01  -  05 Mar 2025 07:00  --------------------------------------------------------  IN: 200 mL / OUT: 1 mL / NET: 199 mL    05 Mar 2025 07:01  -  05 Mar 2025 11:14  --------------------------------------------------------  IN: 200 mL / OUT: 0 mL / NET: 200 mL      BMI (kg/m2): 29.6 (03-01-25 @ 01:00)  PHYSICAL EXAM:  General: NAD, A/O x1-2  ENT: MMM, no scleral icterus  Neck: Supple, No JVD  Lungs: Clear to auscultation bilaterally, no wheezes, rales, rhonchi  Cardio: RRR, S1/S2  Abdomen: Soft, Nontender, Nondistended; Bowel sounds present  Extremities: No calf tenderness, No pitting edema    LABS:                        13.5   10.11 )-----------( 401      ( 05 Mar 2025 05:30 )             41.7       03-05    136  |  102  |  20  ----------------------------<  99  4.0   |  26  |  0.72    Ca    9.1      05 Mar 2025 05:30    TPro  6.4  /  Alb  2.7  /  TBili  0.3  /  DBili  x   /  AST  23  /  ALT  15  /  AlkPhos  96  03-03                                  Urinalysis Basic - ( 05 Mar 2025 05:30 )    Color: x / Appearance: x / SG: x / pH: x  Gluc: 99 mg/dL / Ketone: x  / Bili: x / Urobili: x   Blood: x / Protein: x / Nitrite: x   Leuk Esterase: x / RBC: x / WBC x   Sq Epi: x / Non Sq Epi: x / Bacteria: x        Culture - Urine (collected 01 Mar 2025 04:23)  Source: Clean Catch None  Final Report (04 Mar 2025 13:09):    >100,000 CFU/ml Escherichia coli ESBL    <10,000 CFU/ml    Normal Urogenital virgil present  Organism: Escherichia coli ESBL (04 Mar 2025 13:06)  Organism: Escherichia coli ESBL (04 Mar 2025 13:06)      -  Levofloxacin: R 4      -  Tobramycin: S <=2      -  Nitrofurantoin: S <=32 Should not be used to treat pyelonephritis      -  Aztreonam: R 8      -  Gentamicin: S <=2      -  Cefazolin: R >16 For uncomplicated UTI with K. pneumoniae, E. coli, or P. mirablis: BEBETO <=16 is sensitive and BEBETO >=32 is resistant. This also predicts results for oral agents cefaclor, cefdinir, cefpodoxime, cefprozil, cefuroxime axetil, cephalexin and locarbef for uncomplicated UTI. Note that some isolates may be susceptible to these agents while testing resistant to cefazolin.      -  Cefepime: R <=2      -  Piperacillin/Tazobactam: S <=8      -  Ciprofloxacin: R >2      -  Imipenem: S <=1      -  Ceftriaxone: R >32      -  Ampicillin: R >16 These ampicillin results predict results for amoxicillin      Method Type: BEBETO      -  Meropenem: S <=1      -  Ampicillin/Sulbactam: S <=4/2      -  Cefuroxime: R >16      -  Trimethoprim/Sulfamethoxazole: R >2/38      -  Ertapenem: S <=0.5      COVID-19 PCR: Kahlil (03-04-25 @ 18:00)      RADIOLOGY & ADDITIONAL TESTS:    Care Discussed with Consultants/Other Providers:   
PROGRESS NOTE:     Patient is a 89y old  Female who presents with a chief complaint of UTI (01 Mar 2025 04:40)      SUBJECTIVE / OVERNIGHT EVENTS: pt was seen and evaluated today  no complains offered. she was resting comfortable  discussed patient having UTI And being on rocephin    ADDITIONAL REVIEW OF SYSTEMS: as per HPI    MEDICATIONS  (STANDING):  apixaban 5 milliGRAM(s) Oral every 12 hours  aspirin  chewable 81 milliGRAM(s) Oral daily  cholecalciferol 2000 Unit(s) Oral daily  escitalopram 5 milliGRAM(s) Oral daily  fluticasone propionate/ salmeterol 500-50 MICROgram(s) Diskus 1 Dose(s) Inhalation two times a day  folic acid 1 milliGRAM(s) Oral daily  lactated ringers. 800 milliLiter(s) (80 mL/Hr) IV Continuous <Continuous>  multivitamin 1 Tablet(s) Oral daily  nystatin Powder 1 Application(s) Topical two times a day  pantoprazole    Tablet 40 milliGRAM(s) Oral before breakfast  QUEtiapine 12.5 milliGRAM(s) Oral daily  thiamine 100 milliGRAM(s) Oral daily    MEDICATIONS  (PRN):  acetaminophen     Tablet .. 650 milliGRAM(s) Oral every 6 hours PRN Mild Pain (1 - 3)      CAPILLARY BLOOD GLUCOSE        I&O's Summary      PHYSICAL EXAM:  Vital Signs Last 24 Hrs  T(C): 36.2 (01 Mar 2025 01:00), Max: 36.2 (01 Mar 2025 01:00)  T(F): 97.2 (01 Mar 2025 01:00), Max: 97.2 (01 Mar 2025 01:00)  HR: 63 (01 Mar 2025 01:00) (63 - 63)  BP: 101/69 (01 Mar 2025 01:00) (101/69 - 101/69)  BP(mean): --  RR: 20 (01 Mar 2025 01:00) (20 - 20)  SpO2: 99% (01 Mar 2025 01:00) (99% - 99%)    Parameters below as of 01 Mar 2025 01:00  Patient On (Oxygen Delivery Method): room air        GENERAL: NAD  HEAD:  Normocephalic  EYES: EOMI, PERRLA, conjunctiva and sclera clear  ENMT: No tonsillar erythema, exudates, or enlargement; fairly moist mucous membranes, No lesions  NECK: Supple, No JVD, no bruit, normal thyroid  NERVOUS SYSTEM:  Alert & Oriented X1, Good concentration; grossly  moves all fours  CHEST/LUNG: Clear to auscultation bilaterally; No rales, rhonchi, wheezing, or rubs  HEART: Regular rate and rhythm; No murmurs, rubs, or gallops  ABDOMEN: Soft, Nontender, Nondistended; Bowel sounds present. noted loose yellow stool on diaper  EXTREMITIES:  2+ Peripheral Pulses, No clubbing, cyanosis, or edema. + R heel early Stage1. some scabs along L leg   LYMPH: No lymphadenopathy noted  SKIN: +intertrigo groin. milder under breast and abd pannus    LABS:                        13.9   13.85 )-----------( 417      ( 01 Mar 2025 01:56 )             42.7     03-    140  |  105  |  22  ----------------------------<  118[H]  3.8   |  28  |  0.72    Ca    8.9      01 Mar 2025 01:56  Mg     2.0     03-    TPro  6.7  /  Alb  2.8[L]  /  TBili  0.4  /  DBili  x   /  AST  23  /  ALT  16  /  AlkPhos  99  03-01          Urinalysis Basic - ( 01 Mar 2025 04:23 )    Color: Dark Yellow / Appearance: Turbid / S.037 / pH: x  Gluc: x / Ketone: Negative mg/dL  / Bili: Small / Urobili: 4.0 mg/dL   Blood: x / Protein: 100 mg/dL / Nitrite: Positive   Leuk Esterase: Large / RBC: >50 /HPF / WBC >50 /HPF   Sq Epi: x / Non Sq Epi: x / Bacteria: Too Numerous to count /HPF        Urinalysis with Rflx Culture (collected 01 Mar 2025 04:23)        RADIOLOGY & ADDITIONAL TESTS:  Results Reviewed:   Imaging Personally Reviewed:  Electrocardiogram Personally Reviewed:    COORDINATION OF CARE:  Care Discussed with Consultants/Other Providers [Y/N]:  case discussed during IDR   
Patient is a 89y old  Female who presents with a chief complaint of UTI (02 Mar 2025 08:32)      Patient seen and examined at bedside, stable, no acute medical complaints. no further episodes of diarrhea reported. denies cp, sob, abd pain, nausea, vomiting.     ALLERGIES:  No Known Allergies    MEDICATIONS  (STANDING):  apixaban 5 milliGRAM(s) Oral every 12 hours  aspirin  chewable 81 milliGRAM(s) Oral daily  cefTRIAXone   IVPB 1000 milliGRAM(s) IV Intermittent every 24 hours  cholecalciferol 2000 Unit(s) Oral daily  escitalopram 5 milliGRAM(s) Oral daily  fluticasone propionate/ salmeterol 500-50 MICROgram(s) Diskus 1 Dose(s) Inhalation two times a day  folic acid 1 milliGRAM(s) Oral daily  multivitamin 1 Tablet(s) Oral daily  nystatin Powder 1 Application(s) Topical two times a day  pantoprazole    Tablet 40 milliGRAM(s) Oral before breakfast  QUEtiapine 12.5 milliGRAM(s) Oral daily  thiamine 100 milliGRAM(s) Oral daily    MEDICATIONS  (PRN):  acetaminophen     Tablet .. 650 milliGRAM(s) Oral every 6 hours PRN Mild Pain (1 - 3)    Vital Signs Last 24 Hrs  T(F): 98 (03 Mar 2025 05:29), Max: 98.2 (02 Mar 2025 19:48)  HR: 75 (03 Mar 2025 05:29) (75 - 84)  BP: 118/69 (03 Mar 2025 05:29) (118/69 - 120/79)  RR: 18 (03 Mar 2025 05:29) (18 - 18)  SpO2: 91% (03 Mar 2025 05:29) (91% - 93%)  I&O's Summary    02 Mar 2025 07:01  -  03 Mar 2025 07:00  --------------------------------------------------------  IN: 700 mL / OUT: 0 mL / NET: 700 mL    03 Mar 2025 07:01  -  03 Mar 2025 12:25  --------------------------------------------------------  IN: 500 mL / OUT: 0 mL / NET: 500 mL      BMI (kg/m2): 29.6 (03-01-25 @ 01:00)  PHYSICAL EXAM:  General: NAD, A/O x1-2  ENT: MMM, no scleral icterus  Neck: Supple, No JVD  Lungs: Clear to auscultation bilaterally, no wheezes, rales, rhonchi  Cardio: RRR, S1/S2  Abdomen: Soft, Nontender, Nondistended; Bowel sounds present  Extremities: No calf tenderness, No pitting edema    LABS:                        12.2   8.71  )-----------( 348      ( 03 Mar 2025 06:20 )             38.6       03-03    144  |  108  |  16  ----------------------------<  71  3.6   |  26  |  0.50    Ca    9.0      03 Mar 2025 06:20  Mg     1.9     03-01    TPro  6.4  /  Alb  2.7  /  TBili  0.3  /  DBili  x   /  AST  23  /  ALT  15  /  AlkPhos  96  03-03            CARDIAC MARKERS ( 01 Mar 2025 01:56 )  x     / 14.7 ng/L / x     / x     / x              01:56 - VBG - pH: 7.41  | pCO2: 46    | pO2: <35   | Lactate:                      Urinalysis Basic - ( 03 Mar 2025 06:20 )    Color: x / Appearance: x / SG: x / pH: x  Gluc: 71 mg/dL / Ketone: x  / Bili: x / Urobili: x   Blood: x / Protein: x / Nitrite: x   Leuk Esterase: x / RBC: x / WBC x   Sq Epi: x / Non Sq Epi: x / Bacteria: x        Culture - Urine (collected 01 Mar 2025 04:23)  Source: Clean Catch None  Preliminary Report (03 Mar 2025 00:39):    >100,000 CFU/ml Escherichia coli          RADIOLOGY & ADDITIONAL TESTS:    Care Discussed with Consultants/Other Providers:   
Patient is a 89y old  Female who presents with a chief complaint of UTI (04 Mar 2025 07:36)      Patient seen and examined at bedside. mentation improved to baseline. denies acute medical complaints.     ALLERGIES:  No Known Allergies    MEDICATIONS  (STANDING):  apixaban 5 milliGRAM(s) Oral every 12 hours  aspirin  chewable 81 milliGRAM(s) Oral daily  cefTRIAXone   IVPB 1000 milliGRAM(s) IV Intermittent every 24 hours  cholecalciferol 2000 Unit(s) Oral daily  escitalopram 5 milliGRAM(s) Oral daily  fluticasone propionate/ salmeterol 500-50 MICROgram(s) Diskus 1 Dose(s) Inhalation two times a day  folic acid 1 milliGRAM(s) Oral daily  multivitamin 1 Tablet(s) Oral daily  nystatin Powder 1 Application(s) Topical two times a day  pantoprazole    Tablet 40 milliGRAM(s) Oral before breakfast  QUEtiapine 12.5 milliGRAM(s) Oral daily  thiamine 100 milliGRAM(s) Oral daily    MEDICATIONS  (PRN):  acetaminophen     Tablet .. 650 milliGRAM(s) Oral every 6 hours PRN Mild Pain (1 - 3)    Vital Signs Last 24 Hrs  T(F): 97.5 (04 Mar 2025 05:00), Max: 98.4 (03 Mar 2025 13:23)  HR: 74 (04 Mar 2025 11:07) (60 - 89)  BP: 114/64 (04 Mar 2025 05:00) (112/74 - 128/80)  RR: 17 (04 Mar 2025 05:00) (17 - 18)  SpO2: 94% (04 Mar 2025 11:07) (92% - 94%)  I&O's Summary    03 Mar 2025 07:01  -  04 Mar 2025 07:00  --------------------------------------------------------  IN: 900 mL / OUT: 1 mL / NET: 899 mL    04 Mar 2025 07:01  -  04 Mar 2025 11:57  --------------------------------------------------------  IN: 200 mL / OUT: 0 mL / NET: 200 mL      BMI (kg/m2): 29.6 (03-01-25 @ 01:00)  PHYSICAL EXAM:  General: NAD, A/O x1-2  ENT: MMM, no scleral icterus  Neck: Supple, No JVD  Lungs: Clear to auscultation bilaterally, no wheezes, rales, rhonchi  Cardio: RRR, S1/S2  Abdomen: Soft, Nontender, Nondistended; Bowel sounds present  Extremities: No calf tenderness, No pitting edema    LABS:                        12.8   9.49  )-----------( 374      ( 04 Mar 2025 06:48 )             39.1       03-04    140  |  106  |  18  ----------------------------<  90  3.6   |  27  |  0.60    Ca    8.8      04 Mar 2025 06:48    TPro  6.4  /  Alb  2.7  /  TBili  0.3  /  DBili  x   /  AST  23  /  ALT  15  /  AlkPhos  96  03-03                                  Urinalysis Basic - ( 04 Mar 2025 06:48 )    Color: x / Appearance: x / SG: x / pH: x  Gluc: 90 mg/dL / Ketone: x  / Bili: x / Urobili: x   Blood: x / Protein: x / Nitrite: x   Leuk Esterase: x / RBC: x / WBC x   Sq Epi: x / Non Sq Epi: x / Bacteria: x        Culture - Urine (collected 01 Mar 2025 04:23)  Source: Clean Catch None  Preliminary Report (03 Mar 2025 00:39):    >100,000 CFU/ml Escherichia coli          RADIOLOGY & ADDITIONAL TESTS:    Care Discussed with Consultants/Other Providers:   
Patient seen and examined at bedside. some dry blood on arms. patient is comfortable with no complaints. AOx2. Spoke to nursing, patient pulling out IV lines despite wrapping by nursing. patient not aggressive however will work through wrapping and pull out lines with some bleeding from IV site. last BM yesterday, no C diff sent. no emesis in 24 hours.      ALLERGIES:  No Known Allergies    MEDICATIONS  (STANDING):  apixaban 5 milliGRAM(s) Oral every 12 hours  aspirin  chewable 81 milliGRAM(s) Oral daily  cefTRIAXone   IVPB 1000 milliGRAM(s) IV Intermittent every 24 hours  cholecalciferol 2000 Unit(s) Oral daily  escitalopram 5 milliGRAM(s) Oral daily  fluticasone propionate/ salmeterol 500-50 MICROgram(s) Diskus 1 Dose(s) Inhalation two times a day  folic acid 1 milliGRAM(s) Oral daily  lactated ringers. 800 milliLiter(s) (80 mL/Hr) IV Continuous <Continuous>  multivitamin 1 Tablet(s) Oral daily  nystatin Powder 1 Application(s) Topical two times a day  pantoprazole    Tablet 40 milliGRAM(s) Oral before breakfast  QUEtiapine 12.5 milliGRAM(s) Oral daily  thiamine 100 milliGRAM(s) Oral daily    MEDICATIONS  (PRN):  acetaminophen     Tablet .. 650 milliGRAM(s) Oral every 6 hours PRN Mild Pain (1 - 3)    Vital Signs Last 24 Hrs  T(F): 97.9 (02 Mar 2025 05:09), Max: 99.9 (01 Mar 2025 19:32)  HR: 88 (02 Mar 2025 05:09) (88 - 92)  BP: 100/64 (02 Mar 2025 05:09) (99/56 - 113/89)  RR: 18 (02 Mar 2025 05:09) (17 - 18)  SpO2: 91% (02 Mar 2025 05:09) (91% - 94%)  I&O's Summary    01 Mar 2025 07:01  -  02 Mar 2025 07:00  --------------------------------------------------------  IN: 500 mL / OUT: 0 mL / NET: 500 mL      BMI (kg/m2): 29.6 (03-01-25 @ 01:00)  PHYSICAL EXAM:    Gen: nad, resting in bed  Neuro: AOx2  Heent: no jvd, no oral exudates  Pulm: cta b/l, no w/r/r  CV: +s1s2, no m/r/g  Ab: soft, nt/nd, normoactive bs x 4  Extrem: dry blood on arms in areas of removed IV lines.  Skin: no rashes  Psych: normal    LABS:                        12.2   7.62  )-----------( 364      ( 02 Mar 2025 05:55 )             38.4       03-02    145  |  111  |  17  ----------------------------<  87  3.6   |  28  |  0.63    Ca    8.7      02 Mar 2025 05:55  Mg     1.9     03-01    TPro  5.6  /  Alb  2.3  /  TBili  0.3  /  DBili  x   /  AST  18  /  ALT  15  /  AlkPhos  88  03-02            CARDIAC MARKERS ( 01 Mar 2025 01:56 )  x     / 14.7 ng/L / x     / x     / x              01:56 - VBG - pH: 7.41  | pCO2: 46    | pO2: <35   | Lactate:                      Urinalysis Basic - ( 02 Mar 2025 05:55 )    Color: x / Appearance: x / SG: x / pH: x  Gluc: 87 mg/dL / Ketone: x  / Bili: x / Urobili: x   Blood: x / Protein: x / Nitrite: x   Leuk Esterase: x / RBC: x / WBC x   Sq Epi: x / Non Sq Epi: x / Bacteria: x            RADIOLOGY & ADDITIONAL TESTS:    Care Discussed with Consultants/Other Providers:

## 2025-03-05 NOTE — DISCHARGE NOTE PROVIDER - NSDCHHNEEDSERVICE_GEN_ALL_CORE
Addended by: DELROY FLORES on: 8/29/2022 07:52 AM     Modules accepted: Orders     Observation and assessment/Rehabilitation services

## 2025-03-05 NOTE — DISCHARGE NOTE NURSING/CASE MANAGEMENT/SOCIAL WORK - NSDCVIVACCINE_GEN_ALL_CORE_FT
Tdap; 08-Sep-2024 13:15; Maria A Young (RN); [employer]; [id number] (Exp. Date: 28-Feb-2026); IntraMuscular; Deltoid Left.; 0.5 milliLiter(s); VIS (VIS Published: 09-May-2013, VIS Presented: 08-Sep-2024);

## 2025-03-05 NOTE — DISCHARGE NOTE NURSING/CASE MANAGEMENT/SOCIAL WORK - FINANCIAL ASSISTANCE
[facility] provides services at a reduced cost to those who are determined to be eligible through [facility]’s financial assistance program. Information regarding [facility]’s financial assistance program can be found by going to [email or web address] or by calling [phone].

## 2025-03-05 NOTE — DISCHARGE NOTE NURSING/CASE MANAGEMENT/SOCIAL WORK - NSPROMEDSBROUGHTTOHOSP_GEN_A_NUR
Pt presents with left 4th finger laceration at knuckle. Cut on piece of metal in shop class at school. Tetanus UTD. 2012  
no

## 2025-08-04 ENCOUNTER — EMERGENCY (EMERGENCY)
Facility: HOSPITAL | Age: 89
LOS: 1 days | End: 2025-08-04
Attending: STUDENT IN AN ORGANIZED HEALTH CARE EDUCATION/TRAINING PROGRAM | Admitting: STUDENT IN AN ORGANIZED HEALTH CARE EDUCATION/TRAINING PROGRAM
Payer: MEDICARE

## 2025-08-04 VITALS
WEIGHT: 166.89 LBS | HEART RATE: 67 BPM | HEIGHT: 63 IN | TEMPERATURE: 98 F | OXYGEN SATURATION: 100 % | DIASTOLIC BLOOD PRESSURE: 80 MMHG | RESPIRATION RATE: 18 BRPM | SYSTOLIC BLOOD PRESSURE: 111 MMHG

## 2025-08-04 VITALS
SYSTOLIC BLOOD PRESSURE: 112 MMHG | HEART RATE: 93 BPM | RESPIRATION RATE: 18 BRPM | DIASTOLIC BLOOD PRESSURE: 71 MMHG | OXYGEN SATURATION: 100 %

## 2025-08-04 PROCEDURE — 72125 CT NECK SPINE W/O DYE: CPT | Mod: 26

## 2025-08-04 PROCEDURE — 99285 EMERGENCY DEPT VISIT HI MDM: CPT | Mod: 25

## 2025-08-04 PROCEDURE — 70450 CT HEAD/BRAIN W/O DYE: CPT | Mod: 26

## 2025-08-04 PROCEDURE — 71045 X-RAY EXAM CHEST 1 VIEW: CPT

## 2025-08-04 PROCEDURE — 90715 TDAP VACCINE 7 YRS/> IM: CPT

## 2025-08-04 PROCEDURE — 12001 RPR S/N/AX/GEN/TRNK 2.5CM/<: CPT

## 2025-08-04 PROCEDURE — 70450 CT HEAD/BRAIN W/O DYE: CPT

## 2025-08-04 PROCEDURE — 72125 CT NECK SPINE W/O DYE: CPT

## 2025-08-04 PROCEDURE — 99284 EMERGENCY DEPT VISIT MOD MDM: CPT | Mod: 25

## 2025-08-04 PROCEDURE — 90471 IMMUNIZATION ADMIN: CPT

## 2025-08-04 PROCEDURE — 71045 X-RAY EXAM CHEST 1 VIEW: CPT | Mod: 26

## 2025-08-04 PROCEDURE — 72170 X-RAY EXAM OF PELVIS: CPT | Mod: 26

## 2025-08-04 PROCEDURE — 72170 X-RAY EXAM OF PELVIS: CPT

## 2025-08-04 RX ORDER — LIDOCAINE HCL/EPINEPHRINE/PF 1 %-1:200K
10 AMPUL (ML) INJECTION ONCE
Refills: 0 | Status: COMPLETED | OUTPATIENT
Start: 2025-08-04 | End: 2025-08-04

## 2025-08-04 RX ADMIN — Medication 10 MILLILITER(S): at 16:01
